# Patient Record
Sex: FEMALE | Race: WHITE | Employment: OTHER | URBAN - METROPOLITAN AREA
[De-identification: names, ages, dates, MRNs, and addresses within clinical notes are randomized per-mention and may not be internally consistent; named-entity substitution may affect disease eponyms.]

---

## 2017-01-04 RX ORDER — ATORVASTATIN CALCIUM 10 MG/1
10 TABLET, FILM COATED ORAL NIGHTLY
Qty: 90 TABLET | Refills: 0 | Status: SHIPPED | OUTPATIENT
Start: 2017-01-04 | End: 2017-04-17

## 2017-01-04 NOTE — TELEPHONE ENCOUNTER
From: Evens Stoddard  To: Mely Hernandez MD  Sent: 1/4/2017 7:47 AM CST  Subject: Medication Renewal Request    Original authorizing provider: MD Evens Vaughn would like a refill of the following medications:  Sheng Srinivasan

## 2017-01-21 NOTE — TELEPHONE ENCOUNTER
From: Ros Vann  To: Sydney Rosario MD  Sent: 1/20/2017 4:27 PM CST  Subject: Medication Renewal Request    Original authorizing provider: MD Ros Matta would like a refill of the following medications:  lisinopril

## 2017-01-23 RX ORDER — LISINOPRIL 5 MG/1
5 TABLET ORAL DAILY
Qty: 90 TABLET | Refills: 3 | OUTPATIENT
Start: 2017-01-23

## 2017-01-24 RX ORDER — LISINOPRIL 5 MG/1
TABLET ORAL
Qty: 90 TABLET | Refills: 0 | Status: SHIPPED | OUTPATIENT
Start: 2017-01-24 | End: 2017-05-15

## 2017-03-28 ENCOUNTER — HOSPITAL ENCOUNTER (OUTPATIENT)
Dept: GENERAL RADIOLOGY | Age: 72
Discharge: HOME | End: 2017-03-28
Attending: INTERNAL MEDICINE
Payer: MEDICARE

## 2017-03-28 ENCOUNTER — OFFICE VISIT (OUTPATIENT)
Dept: INTERNAL MEDICINE CLINIC | Facility: CLINIC | Age: 72
End: 2017-03-28

## 2017-03-28 VITALS
RESPIRATION RATE: 16 BRPM | TEMPERATURE: 98 F | BODY MASS INDEX: 32.79 KG/M2 | OXYGEN SATURATION: 99 % | HEART RATE: 59 BPM | HEIGHT: 65.5 IN | SYSTOLIC BLOOD PRESSURE: 122 MMHG | WEIGHT: 199.19 LBS | DIASTOLIC BLOOD PRESSURE: 82 MMHG

## 2017-03-28 DIAGNOSIS — G89.29 CHRONIC RIGHT-SIDED LOW BACK PAIN WITHOUT SCIATICA: ICD-10-CM

## 2017-03-28 DIAGNOSIS — M54.50 CHRONIC RIGHT-SIDED LOW BACK PAIN WITHOUT SCIATICA: ICD-10-CM

## 2017-03-28 DIAGNOSIS — R52 PAIN: ICD-10-CM

## 2017-03-28 DIAGNOSIS — R52 PAIN: Primary | ICD-10-CM

## 2017-03-28 DIAGNOSIS — M12.812 ROTATOR CUFF ARTHROPATHY, LEFT: ICD-10-CM

## 2017-03-28 PROCEDURE — 99214 OFFICE O/P EST MOD 30 MIN: CPT | Performed by: INTERNAL MEDICINE

## 2017-03-28 PROCEDURE — 73030 X-RAY EXAM OF SHOULDER: CPT

## 2017-03-28 NOTE — PROGRESS NOTES
Doug Overton is a 74year old female  Patient presents with:  Pain: Left Upper Arm/Shoulder area, Right side      HPI:   Left arm pain for several weeks, there all the time, not progressive, reaching elicits and so does internal rotation, shooting pain up Dyslipidemia     Abnormal Pap smear of cervix     Essential hypertension     KATIANA I (cervical intraepithelial neoplasia I)     Tachyarrhythmia     IGT (impaired glucose tolerance)     Carotid disease, bilateral (HCC)        REVIEW OF SYSTEMS:   GENERAL HE

## 2017-03-30 ENCOUNTER — TELEPHONE (OUTPATIENT)
Dept: INTERNAL MEDICINE CLINIC | Facility: CLINIC | Age: 72
End: 2017-03-30

## 2017-03-30 NOTE — TELEPHONE ENCOUNTER
Patient called returning Tierra's call to get her test results of her Xray of her shoulder. No one picking up, please return patients call. Thank you.

## 2017-04-04 ENCOUNTER — OFFICE VISIT (OUTPATIENT)
Dept: PHYSICAL THERAPY | Age: 72
End: 2017-04-04
Attending: INTERNAL MEDICINE
Payer: MEDICARE

## 2017-04-04 DIAGNOSIS — M12.812 ROTATOR CUFF ARTHROPATHY, LEFT: Primary | ICD-10-CM

## 2017-04-04 DIAGNOSIS — G89.29 CHRONIC RIGHT-SIDED LOW BACK PAIN WITHOUT SCIATICA: ICD-10-CM

## 2017-04-04 DIAGNOSIS — M54.50 CHRONIC RIGHT-SIDED LOW BACK PAIN WITHOUT SCIATICA: ICD-10-CM

## 2017-04-04 PROCEDURE — 97162 PT EVAL MOD COMPLEX 30 MIN: CPT

## 2017-04-04 PROCEDURE — 97110 THERAPEUTIC EXERCISES: CPT

## 2017-04-04 PROCEDURE — 97530 THERAPEUTIC ACTIVITIES: CPT

## 2017-04-04 NOTE — PROGRESS NOTES
UPPER EXTREMITY EVALUATION:   Referring Physician: Dr. Qasim Chase  Diagnosis: L RTC arthropathy   .  R LBP Date of Service: 4/4/2017     PATIENT SUMMARY   Ximena Paul is a 74year old y/o female who presents to therapy today with complaints of L shoul overhead, behind her back and laterally. She also notes R sided low back pain without radiculopathy that increases when standing and walking, decreases with sitting. Pain is subacute and was not slightly increased with lumbar side bend left.  Differential external rotation, SL abd, SKTC, seated flexion. Initiated posterior mobs, inferior glides, gentle stretch to L shoulder.    Therapeutic activities: discusses use of ice, activity modification, especially in the pool, use of flexion to ease back pain, incre care.    X___________________________________________________ Date____________________    Certification From: 1/8/2365  To:7/3/2017

## 2017-04-13 ENCOUNTER — OFFICE VISIT (OUTPATIENT)
Dept: PHYSICAL THERAPY | Age: 72
End: 2017-04-13
Attending: INTERNAL MEDICINE
Payer: MEDICARE

## 2017-04-13 PROCEDURE — 97110 THERAPEUTIC EXERCISES: CPT

## 2017-04-13 PROCEDURE — 97140 MANUAL THERAPY 1/> REGIONS: CPT

## 2017-04-13 NOTE — PROGRESS NOTES
Dx: L shoulder impingement, R LBP       Authorized # of Visits:  10       Next MD visit: none scheduled  Fall Risk: standard         Precautions: n/a     Significant findings include TIA,HTN, carotid disease, R trochanteric bursitis.     Xray: shoulder: mil lumbar flexion  30 sec x3         Pulleys for IR  5 mins         Posterior and inf glides to L GH joints GR II-III         Gentle stretch into L shoulder ER/IR,flex/abd         L L-S rot mob Gr II, central p/a to lumbar spine GR II

## 2017-04-17 RX ORDER — ATORVASTATIN CALCIUM 10 MG/1
TABLET, FILM COATED ORAL
Qty: 90 TABLET | Refills: 0 | Status: SHIPPED | OUTPATIENT
Start: 2017-04-17 | End: 2017-09-06

## 2017-04-17 RX ORDER — LEVOTHYROXINE SODIUM 0.05 MG/1
TABLET ORAL
Qty: 90 TABLET | Refills: 0 | Status: SHIPPED | OUTPATIENT
Start: 2017-04-17 | End: 2017-08-07

## 2017-04-17 NOTE — TELEPHONE ENCOUNTER
Pt states that she is in physical therapy now, will call in  1-2 months to schedule this appt when she is done.

## 2017-04-18 ENCOUNTER — APPOINTMENT (OUTPATIENT)
Dept: PHYSICAL THERAPY | Age: 72
End: 2017-04-18
Attending: INTERNAL MEDICINE
Payer: MEDICARE

## 2017-04-20 ENCOUNTER — OFFICE VISIT (OUTPATIENT)
Dept: PHYSICAL THERAPY | Age: 72
End: 2017-04-20
Attending: INTERNAL MEDICINE
Payer: MEDICARE

## 2017-04-20 PROCEDURE — 97112 NEUROMUSCULAR REEDUCATION: CPT

## 2017-04-20 PROCEDURE — 97140 MANUAL THERAPY 1/> REGIONS: CPT

## 2017-04-20 PROCEDURE — 97110 THERAPEUTIC EXERCISES: CPT

## 2017-04-20 NOTE — PROGRESS NOTES
Dx: L shoulder impingement, R LBP       Authorized # of Visits:  10       Next MD visit: none scheduled  Fall Risk: standard         Precautions: n/a     Significant findings include TIA,HTN, carotid disease, R trochanteric bursitis.     Xray: shoulder: mil free ROM  *  Added towel stretch for L IR Shoulder abd and flex to 90 deg x20              * added seated lumbar flexion  30 sec x3 SKTC 30 sec x3 R/l        Pulleys for IR  5 mins Pulleys for flexion, abd, IR        Posterior and inf glides to L GH joints

## 2017-04-27 ENCOUNTER — APPOINTMENT (OUTPATIENT)
Dept: PHYSICAL THERAPY | Age: 72
End: 2017-04-27
Attending: INTERNAL MEDICINE
Payer: MEDICARE

## 2017-05-15 RX ORDER — LISINOPRIL 5 MG/1
TABLET ORAL
Qty: 90 TABLET | Refills: 0 | Status: SHIPPED | OUTPATIENT
Start: 2017-05-15 | End: 2017-09-26

## 2017-08-07 RX ORDER — LEVOTHYROXINE SODIUM 0.05 MG/1
TABLET ORAL
Qty: 90 TABLET | Refills: 0 | Status: SHIPPED | OUTPATIENT
Start: 2017-08-07 | End: 2017-11-16

## 2017-08-16 ENCOUNTER — HOSPITAL ENCOUNTER (OUTPATIENT)
Dept: CV DIAGNOSTICS | Facility: HOSPITAL | Age: 72
Discharge: HOME | End: 2017-08-16
Attending: INTERNAL MEDICINE
Payer: MEDICARE

## 2017-08-16 DIAGNOSIS — I10 ESSENTIAL HYPERTENSION: ICD-10-CM

## 2017-08-16 DIAGNOSIS — R00.0 TACHYARRHYTHMIA: ICD-10-CM

## 2017-08-16 PROCEDURE — 93270 REMOTE 30 DAY ECG REV/REPORT: CPT | Performed by: INTERNAL MEDICINE

## 2017-08-16 PROCEDURE — 93226 XTRNL ECG REC<48 HR SCAN A/R: CPT | Performed by: INTERNAL MEDICINE

## 2017-08-16 PROCEDURE — 93272 ECG/REVIEW INTERPRET ONLY: CPT | Performed by: INTERNAL MEDICINE

## 2017-09-06 RX ORDER — ATORVASTATIN CALCIUM 10 MG/1
TABLET, FILM COATED ORAL
Qty: 90 TABLET | Refills: 0 | Status: SHIPPED | OUTPATIENT
Start: 2017-09-06 | End: 2018-01-04

## 2017-09-26 RX ORDER — LISINOPRIL 5 MG/1
TABLET ORAL
Qty: 90 TABLET | Refills: 0 | Status: SHIPPED | OUTPATIENT
Start: 2017-09-26 | End: 2017-12-28

## 2017-09-29 PROBLEM — I47.10 SVT (SUPRAVENTRICULAR TACHYCARDIA): Status: ACTIVE | Noted: 2017-09-29

## 2017-09-29 PROBLEM — I47.10 SVT (SUPRAVENTRICULAR TACHYCARDIA) (HCC): Status: ACTIVE | Noted: 2017-09-29

## 2017-09-29 PROBLEM — I47.1 SVT (SUPRAVENTRICULAR TACHYCARDIA) (HCC): Status: ACTIVE | Noted: 2017-09-29

## 2017-09-29 PROBLEM — I47.1 SVT (SUPRAVENTRICULAR TACHYCARDIA): Status: ACTIVE | Noted: 2017-09-29

## 2017-10-09 ENCOUNTER — OFFICE VISIT (OUTPATIENT)
Dept: INTERNAL MEDICINE CLINIC | Facility: CLINIC | Age: 72
End: 2017-10-09

## 2017-10-09 VITALS
BODY MASS INDEX: 32.21 KG/M2 | OXYGEN SATURATION: 99 % | HEART RATE: 51 BPM | WEIGHT: 191 LBS | RESPIRATION RATE: 14 BRPM | DIASTOLIC BLOOD PRESSURE: 68 MMHG | HEIGHT: 64.75 IN | SYSTOLIC BLOOD PRESSURE: 126 MMHG | TEMPERATURE: 98 F

## 2017-10-09 DIAGNOSIS — I77.9 CAROTID DISEASE, BILATERAL (HCC): ICD-10-CM

## 2017-10-09 DIAGNOSIS — I10 ESSENTIAL HYPERTENSION: ICD-10-CM

## 2017-10-09 DIAGNOSIS — I47.1 SVT (SUPRAVENTRICULAR TACHYCARDIA) (HCC): ICD-10-CM

## 2017-10-09 DIAGNOSIS — Z12.31 ENCOUNTER FOR SCREENING MAMMOGRAM FOR BREAST CANCER: Primary | ICD-10-CM

## 2017-10-09 DIAGNOSIS — I63.81 LACUNAR INFARCTION (HCC): ICD-10-CM

## 2017-10-09 DIAGNOSIS — N32.81 OAB (OVERACTIVE BLADDER): ICD-10-CM

## 2017-10-09 DIAGNOSIS — E03.9 ACQUIRED HYPOTHYROIDISM: ICD-10-CM

## 2017-10-09 DIAGNOSIS — N87.0 CIN I (CERVICAL INTRAEPITHELIAL NEOPLASIA I): ICD-10-CM

## 2017-10-09 DIAGNOSIS — R87.619 ABNORMAL CERVICAL PAPANICOLAOU SMEAR, UNSPECIFIED ABNORMAL PAP FINDING: ICD-10-CM

## 2017-10-09 DIAGNOSIS — E78.5 DYSLIPIDEMIA: ICD-10-CM

## 2017-10-09 DIAGNOSIS — Z00.00 ENCOUNTER FOR ANNUAL HEALTH EXAMINATION: ICD-10-CM

## 2017-10-09 DIAGNOSIS — R73.02 IGT (IMPAIRED GLUCOSE TOLERANCE): ICD-10-CM

## 2017-10-09 PROCEDURE — G0439 PPPS, SUBSEQ VISIT: HCPCS | Performed by: INTERNAL MEDICINE

## 2017-10-09 PROCEDURE — 96160 PT-FOCUSED HLTH RISK ASSMT: CPT | Performed by: INTERNAL MEDICINE

## 2017-10-09 NOTE — PATIENT INSTRUCTIONS
Nick Nieto's SCREENING SCHEDULE   Tests on this list are recommended by your physician but may not be covered, or covered at this frequency, by your insurer. Please check with your insurance carrier before scheduling to verify coverage.    PREVENTATIVE Colonoscopy Screen   Covered every 10 years- more often if abnormal Colonoscopy,10 Years due on 09/27/2020 Update Health Maintenance if applicable    Flex Sigmoidoscopy Screen  Covered every 5 years No results found for this or any previous visit.  No fl get once after your 65th birthday    Pneumococcal 23 (Pneumovax)  Covered Once after 65 No orders found for this or any previous visit.  Please get once after your 65th birthday    Hepatitis B for Moderate/High Risk       No orders found for this or any pre

## 2017-10-09 NOTE — PROGRESS NOTES
HPI:   Doug Overton is a 74year old female who presents for a MA (Medicare Advantage) Supervisit (Once per calendar year).     And MA BCBS PE, she sees dr Everette Paredes for her well woman to f/ou on abnormal paps and was just tested, f/u 6 mos  She lives alone 1/2 TABLET(12.5 MG) BY MOUTH DAILY (Patient taking differently: TAKE 1/2 TABLET(12.5 MG) BY MOUTH TWICE DAILY. )   ATORVASTATIN 10 MG Oral Tab TAKE 1 TABLET(10 MG) BY MOUTH EVERY NIGHT   LEVOTHYROXINE SODIUM 50 MCG Oral Tab TAKE ONE TABLET BY MOUTH BEFORE B pack-year smoking history. She has never used smokeless tobacco. She reports that she drinks alcohol. She reports that she does not use drugs.      REVIEW OF SYSTEMS:   GENERAL: feels well otherwise  SKIN: denies any unusual skin lesions  EYES: denies blurr Clear to auscultation bilaterally, respirations unlabored   Heart:  Regular rate and rhythm, S1 and S2 normal, no murmur, rub, or gallop   Abdomen:   Soft, non-tender, bowel sounds active all four quadrants,  no masses, no organomegaly   Pelvic: Deferred THYROXINE)      Lacunar infarction ([facility] [address]- no residual, on ASA daily, RF control, recheck carotid scan periodically         Ms. Nieto already takes aspirin and has it on her medication list.     Diet assessment: excellent     Advanced Directive:  Living [facility] Yes    Hearing Problems?: No     Functional Status     Hearing Problems?: No    Vision Problems? : No    Difficulty walking?: No    Difficulty dressing or bathing?: No    Problems with daily activities? : No    Memory Problems?: No      Fall/Risk Assessmen LDL Annually LDL-CHOLESTEROL (mg/dL (calc))   Date Value   12/01/2016 86        EKG - w/ Initial Preventative Physical Exam only, or if medically necessary Electrocardiogram date09/29/2017       Colorectal Cancer Screening      Colonoscopy Screen every Toxoid  Only covered with a cut with metal- TD and TDaP Not covered by Medicare Part B No vaccine history found This may be covered with your prescription benefits, but Medicare does not cover unless Medically needed    Zoster  Not covered by Medicare Part

## 2017-10-23 ENCOUNTER — TELEPHONE (OUTPATIENT)
Dept: INTERNAL MEDICINE CLINIC | Facility: CLINIC | Age: 72
End: 2017-10-23

## 2017-10-23 NOTE — TELEPHONE ENCOUNTER
Incoming (mail or fax):  fax  Received from:  [facility]  Documentation given to:  triage

## 2017-10-23 NOTE — TELEPHONE ENCOUNTER
Received fax from [facility] re: mammogram done 10/19/17. Impression:Benign Findings; routine screening mammogram in 1 year. To consult folder.

## 2017-11-16 RX ORDER — LEVOTHYROXINE SODIUM 0.05 MG/1
TABLET ORAL
Qty: 90 TABLET | Refills: 2 | Status: SHIPPED | OUTPATIENT
Start: 2017-11-16 | End: 2018-09-05

## 2017-12-29 RX ORDER — LISINOPRIL 5 MG/1
TABLET ORAL
Qty: 90 TABLET | Refills: 0 | Status: SHIPPED | OUTPATIENT
Start: 2017-12-29 | End: 2018-04-28

## 2018-01-04 RX ORDER — ATORVASTATIN CALCIUM 10 MG/1
TABLET, FILM COATED ORAL
Qty: 90 TABLET | Refills: 0 | Status: SHIPPED | OUTPATIENT
Start: 2018-01-04 | End: 2018-04-28

## 2018-04-30 RX ORDER — LISINOPRIL 5 MG/1
TABLET ORAL
Qty: 90 TABLET | Refills: 0 | Status: SHIPPED | OUTPATIENT
Start: 2018-04-30 | End: 2018-08-09

## 2018-04-30 RX ORDER — ATORVASTATIN CALCIUM 10 MG/1
TABLET, FILM COATED ORAL
Qty: 90 TABLET | Refills: 0 | Status: SHIPPED | OUTPATIENT
Start: 2018-04-30 | End: 2018-08-07

## 2018-08-08 ENCOUNTER — TELEPHONE (OUTPATIENT)
Dept: INTERNAL MEDICINE CLINIC | Facility: CLINIC | Age: 73
End: 2018-08-08

## 2018-08-08 RX ORDER — ATORVASTATIN CALCIUM 10 MG/1
TABLET, FILM COATED ORAL
Qty: 90 TABLET | Refills: 0 | Status: SHIPPED | OUTPATIENT
Start: 2018-08-08 | End: 2018-11-20

## 2018-08-08 NOTE — TELEPHONE ENCOUNTER
Lisinopril    Last OV relevant to medication: 10/9/17 Supervisit  Last refill date: 4/30/18     #/refills: 90+1  When pt was asked to return for OV: 1 yr  Upcoming appt/reason: none    Lab Results  Component Value Date   GLU 93 12/13/2017   BUN 19 12/13/20

## 2018-08-09 RX ORDER — LISINOPRIL 5 MG/1
TABLET ORAL
Qty: 90 TABLET | Refills: 0 | Status: SHIPPED | OUTPATIENT
Start: 2018-08-09 | End: 2018-11-20

## 2018-09-07 RX ORDER — LEVOTHYROXINE SODIUM 0.05 MG/1
TABLET ORAL
Qty: 90 TABLET | Refills: 0 | Status: SHIPPED | OUTPATIENT
Start: 2018-09-07 | End: 2018-12-11

## 2018-09-26 ENCOUNTER — OFFICE VISIT (OUTPATIENT)
Dept: INTERNAL MEDICINE CLINIC | Facility: CLINIC | Age: 73
End: 2018-09-26
Payer: MEDICARE

## 2018-09-26 VITALS
SYSTOLIC BLOOD PRESSURE: 122 MMHG | OXYGEN SATURATION: 99 % | RESPIRATION RATE: 16 BRPM | TEMPERATURE: 98 F | HEIGHT: 64.75 IN | WEIGHT: 197 LBS | HEART RATE: 62 BPM | DIASTOLIC BLOOD PRESSURE: 64 MMHG | BODY MASS INDEX: 33.22 KG/M2

## 2018-09-26 DIAGNOSIS — I47.1 SVT (SUPRAVENTRICULAR TACHYCARDIA) (HCC): ICD-10-CM

## 2018-09-26 DIAGNOSIS — E78.5 DYSLIPIDEMIA: ICD-10-CM

## 2018-09-26 DIAGNOSIS — I65.23 BILATERAL CAROTID ARTERY STENOSIS: ICD-10-CM

## 2018-09-26 DIAGNOSIS — I63.81 LACUNAR INFARCTION (HCC): ICD-10-CM

## 2018-09-26 DIAGNOSIS — R87.619 ABNORMAL CERVICAL PAPANICOLAOU SMEAR, UNSPECIFIED ABNORMAL PAP FINDING: ICD-10-CM

## 2018-09-26 DIAGNOSIS — R00.0 TACHYCARDIA: ICD-10-CM

## 2018-09-26 DIAGNOSIS — R10.9 RIGHT FLANK PAIN: ICD-10-CM

## 2018-09-26 DIAGNOSIS — R73.02 IGT (IMPAIRED GLUCOSE TOLERANCE): ICD-10-CM

## 2018-09-26 DIAGNOSIS — Z12.31 ENCOUNTER FOR SCREENING MAMMOGRAM FOR BREAST CANCER: Primary | ICD-10-CM

## 2018-09-26 DIAGNOSIS — E03.9 ACQUIRED HYPOTHYROIDISM: ICD-10-CM

## 2018-09-26 DIAGNOSIS — N87.0 CIN I (CERVICAL INTRAEPITHELIAL NEOPLASIA I): ICD-10-CM

## 2018-09-26 DIAGNOSIS — I10 ESSENTIAL HYPERTENSION: ICD-10-CM

## 2018-09-26 DIAGNOSIS — Z00.00 ENCOUNTER FOR ANNUAL HEALTH EXAMINATION: ICD-10-CM

## 2018-09-26 PROCEDURE — 96160 PT-FOCUSED HLTH RISK ASSMT: CPT | Performed by: INTERNAL MEDICINE

## 2018-09-26 PROCEDURE — G0439 PPPS, SUBSEQ VISIT: HCPCS | Performed by: INTERNAL MEDICINE

## 2018-09-26 RX ORDER — OMEPRAZOLE 20 MG/1
20 CAPSULE, DELAYED RELEASE ORAL
COMMUNITY
End: 2021-08-31

## 2018-09-26 NOTE — PROGRESS NOTES
HPI:   Jordan Herron is a 74year old female who presents for a MA (Medicare Advantage) Supervisit (Once per calendar year).     And [place] BCBS PE, she sees dr Lizbeth Law for her well woman to f/ou on abnormal paps and was just tested, f/u 6 mos  She lives alone (Office Visit) with Sydney Rosario MD:  omeprazole 20 MG Oral Capsule Delayed Release Take 20 mg by mouth daily as needed. metoprolol Tartrate 25 MG Oral Tab TAKE 1/2 TABLET(12.5 MG) BY MOUTH TWICE DAILY.    LEVOTHYROXINE SODIUM 50 MCG Oral Tab T of onset: 76) in her brother. SOCIAL HISTORY:   She  reports that she quit smoking about 43 years ago. Her smoking use included cigarettes. She has a 2.50 pack-year smoking history.  she has never used smokeless tobacco. She reports that she drinks alcoho teeth and gums normal   Neck: Supple, symmetrical, trachea midline, no adenopathy;  thyroid: not enlarged, symmetric, no tenderness/mass/nodules; no carotid bruit or JVD   Back:   Symmetric, no curvature, ROM normal, no CVA tenderness   Lungs:   Clear to a unspecified abnormal pap finding- per Dr Elma Castillo    KATIANA I (cervical intraepithelial neoplasia I) per Dr Elma Castillo    Acquired hypothyroidism euthyroid clinically, check labs, CPM  -     ASSAY, THYROID STIM HORMONE  -     FREE T4 (FREE THYROXINE)      Lacunar i help    Toileting: Able without help    Dressing: Able without help    Eating: Able without help    Driving: Able without help    Preparing your meals: Able without help    Managing money/bills: Able without help    Taking medications as prescribed: Able w EKG - w/ Initial Preventative Physical Exam only, or if medically necessary Electrocardiogram date09/29/2017       Colorectal Cancer Screening      Colonoscopy Screen every 10 years Colonoscopy due on 09/27/2020 Update Health Maintenance if applicable    F Not covered by Medicare Part B No vaccine history found This may be covered with your prescription benefits, but Medicare does not cover unless Medically needed    Zoster  Not covered by Medicare Part B No vaccine history found This may be covered with you

## 2018-09-26 NOTE — PATIENT INSTRUCTIONS
King Yogesh Nieto's SCREENING SCHEDULE   Tests on this list are recommended by your physician but may not be covered, or covered at this frequency, by your insurer. Please check with your insurance carrier before scheduling to verify coverage.    PREVENTATIVE Colonoscopy Screen   Covered every 10 years- more often if abnormal Colonoscopy due on 09/27/2020 Update [facility] if applicable    Flex Sigmoidoscopy Screen  Covered every 5 years No results found for this or any previous visit.  No flowsheet data VACC, 13 STEVE IM    Please get once after your 65th birthday    Pneumococcal 23 (Pneumovax)  Covered Once after 65 No orders found for this or any previous visit.  Please get once after your 65th birthday    Hepatitis B for Moderate/High Risk       No orders

## 2018-10-29 ENCOUNTER — TELEPHONE (OUTPATIENT)
Dept: INTERNAL MEDICINE CLINIC | Facility: CLINIC | Age: 73
End: 2018-10-29

## 2018-10-29 NOTE — TELEPHONE ENCOUNTER
Incoming (mail or fax):   Fax  Received from:  [facility] [place]  Documentation given to:  Left documentation in Dr. Anita Joseph

## 2018-10-29 NOTE — TELEPHONE ENCOUNTER
Mammogram report received from [facility] and placed in folder. Updated Health Maintenance and routed to Dr. Jamila Fields.

## 2018-11-20 RX ORDER — ATORVASTATIN CALCIUM 10 MG/1
TABLET, FILM COATED ORAL
Qty: 90 TABLET | Refills: 0 | Status: SHIPPED | OUTPATIENT
Start: 2018-11-20 | End: 2019-02-25

## 2018-11-20 RX ORDER — LISINOPRIL 5 MG/1
TABLET ORAL
Qty: 90 TABLET | Refills: 0 | Status: SHIPPED | OUTPATIENT
Start: 2018-11-20 | End: 2019-02-25

## 2018-12-12 RX ORDER — LEVOTHYROXINE SODIUM 0.05 MG/1
TABLET ORAL
Qty: 90 TABLET | Refills: 0 | Status: SHIPPED | OUTPATIENT
Start: 2018-12-12 | End: 2019-03-19

## 2019-02-27 RX ORDER — ATORVASTATIN CALCIUM 10 MG/1
TABLET, FILM COATED ORAL
Qty: 90 TABLET | Refills: 1 | Status: SHIPPED | OUTPATIENT
Start: 2019-02-27 | End: 2019-09-03 | Stop reason: DRUGHIGH

## 2019-02-27 RX ORDER — LISINOPRIL 5 MG/1
TABLET ORAL
Qty: 90 TABLET | Refills: 1 | Status: SHIPPED | OUTPATIENT
Start: 2019-02-27 | End: 2019-08-25

## 2019-03-20 RX ORDER — LEVOTHYROXINE SODIUM 0.05 MG/1
TABLET ORAL
Qty: 90 TABLET | Refills: 1 | Status: SHIPPED | OUTPATIENT
Start: 2019-03-20 | End: 2019-10-05

## 2019-06-25 ENCOUNTER — TELEPHONE (OUTPATIENT)
Dept: INTERNAL MEDICINE CLINIC | Facility: CLINIC | Age: 74
End: 2019-06-25

## 2019-08-05 PROBLEM — I70.0 ATHEROSCLEROSIS OF AORTA: Status: ACTIVE | Noted: 2019-08-05

## 2019-08-05 PROBLEM — I70.0 ATHEROSCLEROSIS OF AORTA (HCC): Status: ACTIVE | Noted: 2019-08-05

## 2019-08-05 PROBLEM — I77.1 TORTUOUS AORTA: Status: ACTIVE | Noted: 2019-08-05

## 2019-08-05 PROBLEM — I77.1 TORTUOUS AORTA (HCC): Status: ACTIVE | Noted: 2019-08-05

## 2019-08-16 ENCOUNTER — OFFICE VISIT (OUTPATIENT)
Dept: INTERNAL MEDICINE CLINIC | Facility: CLINIC | Age: 74
End: 2019-08-16
Payer: MEDICARE

## 2019-08-16 VITALS
RESPIRATION RATE: 16 BRPM | HEART RATE: 55 BPM | BODY MASS INDEX: 33.15 KG/M2 | HEIGHT: 65 IN | OXYGEN SATURATION: 96 % | DIASTOLIC BLOOD PRESSURE: 82 MMHG | SYSTOLIC BLOOD PRESSURE: 126 MMHG | WEIGHT: 199 LBS

## 2019-08-16 DIAGNOSIS — Z00.00 ENCOUNTER FOR ANNUAL HEALTH EXAMINATION: Primary | ICD-10-CM

## 2019-08-16 DIAGNOSIS — R87.619 ABNORMAL CERVICAL PAPANICOLAOU SMEAR, UNSPECIFIED ABNORMAL PAP FINDING: ICD-10-CM

## 2019-08-16 DIAGNOSIS — R73.02 IGT (IMPAIRED GLUCOSE TOLERANCE): ICD-10-CM

## 2019-08-16 DIAGNOSIS — I47.1 SVT (SUPRAVENTRICULAR TACHYCARDIA) (HCC): ICD-10-CM

## 2019-08-16 DIAGNOSIS — I65.23 BILATERAL CAROTID ARTERY STENOSIS: ICD-10-CM

## 2019-08-16 DIAGNOSIS — I77.1 TORTUOUS AORTA (HCC): ICD-10-CM

## 2019-08-16 DIAGNOSIS — N87.0 CIN I (CERVICAL INTRAEPITHELIAL NEOPLASIA I): ICD-10-CM

## 2019-08-16 DIAGNOSIS — E78.5 DYSLIPIDEMIA: ICD-10-CM

## 2019-08-16 DIAGNOSIS — I70.0 ATHEROSCLEROSIS OF AORTA (HCC): ICD-10-CM

## 2019-08-16 DIAGNOSIS — E03.9 ACQUIRED HYPOTHYROIDISM: ICD-10-CM

## 2019-08-16 DIAGNOSIS — I63.81 LACUNAR INFARCTION (HCC): ICD-10-CM

## 2019-08-16 DIAGNOSIS — I10 ESSENTIAL HYPERTENSION: ICD-10-CM

## 2019-08-16 PROCEDURE — G0439 PPPS, SUBSEQ VISIT: HCPCS | Performed by: INTERNAL MEDICINE

## 2019-08-16 PROCEDURE — 96160 PT-FOCUSED HLTH RISK ASSMT: CPT | Performed by: INTERNAL MEDICINE

## 2019-08-16 PROCEDURE — 99397 PER PM REEVAL EST PAT 65+ YR: CPT | Performed by: INTERNAL MEDICINE

## 2019-08-16 NOTE — PROGRESS NOTES
HPI:   Harper Alexander is a 66year old female who presents for a MA (Medicare Advantage) Supervisit (Once per calendar year).     And [place] BCBS PE, she sees dr Delisa Chaudhry for her well woman to f/ou on abnormal paps and was just tested in January w/dr alvarenga  She Results   Component Value Date    WBC 11.6 04/09/2016    HGB 12.1 04/09/2016    .0 04/09/2016        ALLERGIES:   She is allergic to seasonal.    CURRENT MEDICATIONS:     Outpatient Medications Marked as Taking for the 8/16/19 encounter (Office Visi in her mother; CVA in her brother; DM, in her mother; HTN,hypercholesterolemia in an other family member; Heart Disorder in her mother; MI, in an other family member; TB in her maternal grandmother; arthritis in her mother; bladder cancer (age of onset: 76 EOM's intact both eyes   Ears:  Normal TM's and external ear canals, both ears   Nose: Nares normal, septum midline,mucosa normal, no drainage or sinus tenderness   Throat: Lips, mucosa, and tongue normal; teeth and gums normal   Neck: Supple, symmetrical, control    Essential hypertension controlled, CPM  -     COMP METABOLIC PANEL (14)    SVT (supraventricular tachycardia) (HCC) stable and mild on BB  -     COMP METABOLIC PANEL (14)    Abnormal cervical Papanicolaou smear, unspecified abnormal pap finding- Family; Visiting Friends    If you are a male age 38-65 or a female age 47-67, do you take aspirin?: Yes    Have you had any immunizations at another office such as Influenza, Hepatitis B, Tetanus, or Pneumococcal?: Yes     Functional Ability     Bathing or provided for quick review of chart, separate sheet to patient  [address] Internal Lab or Procedure External Lab or Procedure   Diabetes Screening      HbgA1C   Annually Lab Results   Component Value Date    A1C 5.8 (H) after 65 09/01/2008 Please get once after your 65th birthday    Hepatitis B for Moderate/High Risk No vaccine history found Medium/high risk factors:   End-stage renal disease   Hemophiliacs who received Factor VIII or IX concentrates   Clients of institut

## 2019-08-16 NOTE — PATIENT INSTRUCTIONS
Kath Nieto's SCREENING SCHEDULE   Tests on this list are recommended by your physician but may not be covered, or covered at this frequency, by your insurer. Please check with your insurance carrier before scheduling to verify coverage.    PREVENTATIVE DUE IN SEPT 27, 2020 Update Health Maintenance if applicable    Flex Sigmoidoscopy Screen  Covered every 5 years No results found for this or any previous visit. No flowsheet data found.      Fecal Occult Blood   Covered Annually No results found for: F 23 (Pneumovax)  Covered Once after 65 No orders found for this or any previous visit. YOU HAD THIS Please get once after your 65th birthday    Hepatitis B for Moderate/High Risk       No orders found for this or any previous visit.      NA Medium/high r

## 2019-08-25 DIAGNOSIS — I10 ESSENTIAL HYPERTENSION: Primary | ICD-10-CM

## 2019-08-26 RX ORDER — LISINOPRIL 5 MG/1
TABLET ORAL
Qty: 90 TABLET | Refills: 0 | Status: SHIPPED | OUTPATIENT
Start: 2019-08-26 | End: 2019-11-29

## 2019-08-31 LAB
ALBUMIN/GLOBULIN RATIO: 1.6 (CALC) (ref 1–2.5)
ALBUMIN: 3.9 G/DL (ref 3.6–5.1)
ALKALINE PHOSPHATASE: 81 U/L (ref 33–130)
ALT: 12 U/L (ref 6–29)
AST: 15 U/L (ref 10–35)
BILIRUBIN, TOTAL: 0.6 MG/DL (ref 0.2–1.2)
BUN: 20 MG/DL (ref 7–25)
CALCIUM: 9.2 MG/DL (ref 8.6–10.4)
CARBON DIOXIDE: 28 MMOL/L (ref 20–32)
CHLORIDE: 105 MMOL/L (ref 98–110)
CHOL/HDLC RATIO: 3.1 (CALC)
CHOLESTEROL, TOTAL: 175 MG/DL
CREATININE: 0.89 MG/DL (ref 0.6–0.93)
EGFR IF AFRICN AM: 73 ML/MIN/1.73M2
EGFR IF NONAFRICN AM: 63 ML/MIN/1.73M2
GLOBULIN: 2.4 G/DL (CALC) (ref 1.9–3.7)
GLUCOSE: 104 MG/DL (ref 65–99)
HDL CHOLESTEROL: 56 MG/DL
LDL-CHOLESTEROL: 101 MG/DL (CALC)
NON-HDL CHOLESTEROL: 119 MG/DL (CALC)
POTASSIUM: 4.6 MMOL/L (ref 3.5–5.3)
PROTEIN, TOTAL: 6.3 G/DL (ref 6.1–8.1)
SODIUM: 142 MMOL/L (ref 135–146)
TRIGLYCERIDES: 87 MG/DL
TSH: 2.34 MIU/L (ref 0.4–4.5)

## 2019-09-03 RX ORDER — ATORVASTATIN CALCIUM 40 MG/1
40 TABLET, FILM COATED ORAL NIGHTLY
Qty: 90 TABLET | Refills: 1 | Status: SHIPPED | OUTPATIENT
Start: 2019-09-03 | End: 2020-04-15

## 2019-09-03 NOTE — PROGRESS NOTES
Order for atorvastatin 40mg PO nightly for #90/1 entered previously. Spoke with patient and advised. Patient verbalized understanding.

## 2019-10-07 ENCOUNTER — HOSPITAL ENCOUNTER (OUTPATIENT)
Dept: GENERAL RADIOLOGY | Age: 74
Discharge: HOME | End: 2019-10-07
Attending: INTERNAL MEDICINE
Payer: MEDICARE

## 2019-10-07 ENCOUNTER — TELEPHONE (OUTPATIENT)
Dept: INTERNAL MEDICINE CLINIC | Facility: CLINIC | Age: 74
End: 2019-10-07

## 2019-10-07 ENCOUNTER — OFFICE VISIT (OUTPATIENT)
Dept: INTERNAL MEDICINE CLINIC | Facility: CLINIC | Age: 74
End: 2019-10-07
Payer: MEDICARE

## 2019-10-07 VITALS
BODY MASS INDEX: 33.44 KG/M2 | OXYGEN SATURATION: 98 % | RESPIRATION RATE: 14 BRPM | SYSTOLIC BLOOD PRESSURE: 122 MMHG | HEART RATE: 67 BPM | DIASTOLIC BLOOD PRESSURE: 78 MMHG | WEIGHT: 200.69 LBS | HEIGHT: 65 IN

## 2019-10-07 DIAGNOSIS — M25.512 ACUTE PAIN OF LEFT SHOULDER: ICD-10-CM

## 2019-10-07 DIAGNOSIS — Z23 NEED FOR VACCINATION: ICD-10-CM

## 2019-10-07 DIAGNOSIS — M25.512 ACUTE PAIN OF LEFT SHOULDER: Primary | ICD-10-CM

## 2019-10-07 PROCEDURE — 90662 IIV NO PRSV INCREASED AG IM: CPT | Performed by: INTERNAL MEDICINE

## 2019-10-07 PROCEDURE — 72050 X-RAY EXAM NECK SPINE 4/5VWS: CPT | Performed by: INTERNAL MEDICINE

## 2019-10-07 PROCEDURE — 99214 OFFICE O/P EST MOD 30 MIN: CPT | Performed by: INTERNAL MEDICINE

## 2019-10-07 PROCEDURE — G0008 ADMIN INFLUENZA VIRUS VAC: HCPCS | Performed by: INTERNAL MEDICINE

## 2019-10-07 RX ORDER — HYDROCODONE BITARTRATE AND ACETAMINOPHEN 5; 325 MG/1; MG/1
1-2 TABLET ORAL EVERY 6 HOURS PRN
Qty: 30 TABLET | Refills: 0 | Status: SHIPPED | OUTPATIENT
Start: 2019-10-07 | End: 2019-10-14

## 2019-10-07 RX ORDER — HYDROCODONE BITARTRATE AND ACETAMINOPHEN 5; 325 MG/1; MG/1
1-2 TABLET ORAL EVERY 6 HOURS PRN
Qty: 30 TABLET | Refills: 0 | Status: SHIPPED | OUTPATIENT
Start: 2019-10-07 | End: 2019-10-07

## 2019-10-07 RX ORDER — PREDNISONE 10 MG/1
TABLET ORAL
Qty: 28 TABLET | Refills: 0 | Status: SHIPPED | OUTPATIENT
Start: 2019-10-07 | End: 2019-10-31 | Stop reason: ALTCHOICE

## 2019-10-07 RX ORDER — CYCLOBENZAPRINE HCL 10 MG
10 TABLET ORAL NIGHTLY
Qty: 30 TABLET | Refills: 1 | Status: SHIPPED | OUTPATIENT
Start: 2019-10-07 | End: 2019-10-27

## 2019-10-07 RX ORDER — LEVOTHYROXINE SODIUM 0.05 MG/1
TABLET ORAL
Qty: 90 TABLET | Refills: 1 | Status: SHIPPED | OUTPATIENT
Start: 2019-10-07 | End: 2020-04-15

## 2019-10-07 NOTE — PROGRESS NOTES
Aden Olmedo is a 66year old female  Patient presents with:  Shoulder Pain: Left      HPI:   Severe pain in the left scapula for the past month and for the past week has radiation to left axilla and medial part of the upper arm  No neck pain  No injury Pack years: 2.5        Types: Cigarettes        Quit date: 1/10/1975        Years since quittin.7      Smokeless tobacco: Never Used      Tobacco comment: quit age 25    Alcohol use:  Yes      Alcohol/week: 0.0 standard drinks      Types: 1 Glasses of (CPT=72050)    Return in about 1 week (around 10/14/2019) for short term followup. There are no Patient Instructions on file for this visit. The patient indicates understanding of these issues and agrees to the plan.

## 2019-10-07 NOTE — TELEPHONE ENCOUNTER
Incoming (mail or fax):  Fax  Received from: [facility]   Documentation given to: Triage    *Prior Authorization

## 2019-10-08 NOTE — PROGRESS NOTES
Spoke to patient, aware of results and recommendations. Patient verbalized understanding. Patient states medication prescribed at last OV has been helping.

## 2019-10-08 NOTE — TELEPHONE ENCOUNTER
Prior authorization completed via cover my meds web site. \"Your information has been submitted to [employer] is reviewing the PA request and you will receive an electronic response.  You may check for the updated outcome later by rosa

## 2019-10-09 NOTE — TELEPHONE ENCOUNTER
Received fax from [facility] that prior Felicity Choe is approved. Called pharmacy & made aware of prior auth approval.  Rx in process. Spoke to pt & made aware rx approved & in process. Pt stated already picked up med on Monday.   Pt stated if pharmacy calls, will can

## 2019-10-09 NOTE — TELEPHONE ENCOUNTER
Incoming (mail or fax):  fax  Received from:  [address] [place] [address]  Documentation given to:  triage

## 2019-10-31 ENCOUNTER — OFFICE VISIT (OUTPATIENT)
Dept: INTERNAL MEDICINE CLINIC | Facility: CLINIC | Age: 74
End: 2019-10-31
Payer: MEDICARE

## 2019-10-31 VITALS
WEIGHT: 197.88 LBS | HEART RATE: 67 BPM | BODY MASS INDEX: 32.97 KG/M2 | OXYGEN SATURATION: 98 % | HEIGHT: 65 IN | SYSTOLIC BLOOD PRESSURE: 112 MMHG | RESPIRATION RATE: 14 BRPM | DIASTOLIC BLOOD PRESSURE: 76 MMHG

## 2019-10-31 DIAGNOSIS — M47.812 CERVICAL SPONDYLOSIS: ICD-10-CM

## 2019-10-31 DIAGNOSIS — M54.12 CERVICAL RADICULOPATHY: Primary | ICD-10-CM

## 2019-10-31 PROCEDURE — 99213 OFFICE O/P EST LOW 20 MIN: CPT | Performed by: INTERNAL MEDICINE

## 2019-10-31 NOTE — PROGRESS NOTES
oRs Vnan is a 66year old female. To F/U from last visit regarding left arm  HPI:    Interim history:she responded to steroids and heat and is pain free now  Her cspine xray showed spondylosis   No new symptoms.  No upper extremity weakness or numbness (cervical intraepithelial neoplasia I)     IGT (impaired glucose tolerance)     Carotid disease, bilateral (HCC)     SVT (supraventricular tachycardia) (HCC)     Atherosclerosis of aorta (HCC)     Tortuous aorta (HCC)     Cervical spondylosis        REVIEW

## 2019-11-07 ENCOUNTER — TELEPHONE (OUTPATIENT)
Dept: INTERNAL MEDICINE CLINIC | Facility: CLINIC | Age: 74
End: 2019-11-07

## 2019-11-07 NOTE — TELEPHONE ENCOUNTER
Received duplicate copies of mammogram patient completed with [facility]. HM updated, one copy placed for Dr. Mauricio Johnson review and signature before being sent to scan.

## 2019-11-07 NOTE — TELEPHONE ENCOUNTER
Incoming (mail or fax):  Fax  Received from: [facility] [place]   Documentation given to: Triage  (Manuel Johnson Results [place])

## 2019-11-07 NOTE — TELEPHONE ENCOUNTER
Incoming (mail or fax):  fax  Received from:  [facility]  Documentation given to:  Triage - Dr Maya Guerrero bin

## 2019-11-12 ENCOUNTER — MED REC SCAN ONLY (OUTPATIENT)
Dept: INTERNAL MEDICINE CLINIC | Facility: CLINIC | Age: 74
End: 2019-11-12

## 2019-11-29 DIAGNOSIS — I10 ESSENTIAL HYPERTENSION: ICD-10-CM

## 2019-12-02 RX ORDER — LISINOPRIL 5 MG/1
TABLET ORAL
Qty: 90 TABLET | Refills: 2 | Status: SHIPPED | OUTPATIENT
Start: 2019-12-02 | End: 2020-09-04

## 2020-01-16 DIAGNOSIS — I10 ESSENTIAL HYPERTENSION: ICD-10-CM

## 2020-01-16 DIAGNOSIS — R00.0 TACHYCARDIA: ICD-10-CM

## 2020-04-15 DIAGNOSIS — E03.9 ACQUIRED HYPOTHYROIDISM: Primary | ICD-10-CM

## 2020-04-15 DIAGNOSIS — E78.5 DYSLIPIDEMIA: ICD-10-CM

## 2020-04-15 RX ORDER — LEVOTHYROXINE SODIUM 0.05 MG/1
TABLET ORAL
Qty: 90 TABLET | Refills: 1 | Status: SHIPPED | OUTPATIENT
Start: 2020-04-15 | End: 2020-10-01

## 2020-04-15 RX ORDER — ATORVASTATIN CALCIUM 40 MG/1
TABLET, FILM COATED ORAL
Qty: 90 TABLET | Refills: 1 | Status: SHIPPED | OUTPATIENT
Start: 2020-04-15 | End: 2020-10-31

## 2020-09-04 DIAGNOSIS — I10 ESSENTIAL HYPERTENSION: ICD-10-CM

## 2020-09-04 RX ORDER — LISINOPRIL 5 MG/1
TABLET ORAL
Qty: 30 TABLET | Refills: 0 | Status: SHIPPED | OUTPATIENT
Start: 2020-09-04 | End: 2020-10-06

## 2020-09-10 ENCOUNTER — OFFICE VISIT (OUTPATIENT)
Dept: INTERNAL MEDICINE CLINIC | Facility: CLINIC | Age: 75
End: 2020-09-10
Payer: MEDICARE

## 2020-09-10 VITALS
HEIGHT: 64.92 IN | TEMPERATURE: 97 F | DIASTOLIC BLOOD PRESSURE: 70 MMHG | SYSTOLIC BLOOD PRESSURE: 116 MMHG | BODY MASS INDEX: 34.32 KG/M2 | HEART RATE: 60 BPM | WEIGHT: 206 LBS | OXYGEN SATURATION: 98 % | RESPIRATION RATE: 16 BRPM

## 2020-09-10 DIAGNOSIS — M47.812 CERVICAL SPONDYLOSIS: ICD-10-CM

## 2020-09-10 DIAGNOSIS — I77.1 TORTUOUS AORTA (HCC): ICD-10-CM

## 2020-09-10 DIAGNOSIS — Z23 FLU VACCINE NEED: ICD-10-CM

## 2020-09-10 DIAGNOSIS — I70.0 ATHEROSCLEROSIS OF AORTA (HCC): ICD-10-CM

## 2020-09-10 DIAGNOSIS — E03.9 ACQUIRED HYPOTHYROIDISM: ICD-10-CM

## 2020-09-10 DIAGNOSIS — I47.1 SVT (SUPRAVENTRICULAR TACHYCARDIA) (HCC): ICD-10-CM

## 2020-09-10 DIAGNOSIS — M47.816 LUMBAR SPONDYLOSIS: ICD-10-CM

## 2020-09-10 DIAGNOSIS — Z12.31 VISIT FOR SCREENING MAMMOGRAM: ICD-10-CM

## 2020-09-10 DIAGNOSIS — R73.02 IGT (IMPAIRED GLUCOSE TOLERANCE): ICD-10-CM

## 2020-09-10 DIAGNOSIS — E78.5 DYSLIPIDEMIA: ICD-10-CM

## 2020-09-10 DIAGNOSIS — Z12.11 COLON CANCER SCREENING: Primary | ICD-10-CM

## 2020-09-10 DIAGNOSIS — I10 ESSENTIAL HYPERTENSION: ICD-10-CM

## 2020-09-10 DIAGNOSIS — Z12.11 SCREENING FOR COLON CANCER: ICD-10-CM

## 2020-09-10 DIAGNOSIS — R87.619 ABNORMAL CERVICAL PAPANICOLAOU SMEAR, UNSPECIFIED ABNORMAL PAP FINDING: ICD-10-CM

## 2020-09-10 DIAGNOSIS — Z00.00 ENCOUNTER FOR ANNUAL HEALTH EXAMINATION: ICD-10-CM

## 2020-09-10 DIAGNOSIS — I65.23 BILATERAL CAROTID ARTERY STENOSIS: ICD-10-CM

## 2020-09-10 PROCEDURE — G0439 PPPS, SUBSEQ VISIT: HCPCS | Performed by: INTERNAL MEDICINE

## 2020-09-10 PROCEDURE — 96160 PT-FOCUSED HLTH RISK ASSMT: CPT | Performed by: INTERNAL MEDICINE

## 2020-09-10 PROCEDURE — 99397 PER PM REEVAL EST PAT 65+ YR: CPT | Performed by: INTERNAL MEDICINE

## 2020-09-10 PROCEDURE — 3074F SYST BP LT 130 MM HG: CPT | Performed by: INTERNAL MEDICINE

## 2020-09-10 PROCEDURE — G0008 ADMIN INFLUENZA VIRUS VAC: HCPCS | Performed by: INTERNAL MEDICINE

## 2020-09-10 PROCEDURE — 3078F DIAST BP <80 MM HG: CPT | Performed by: INTERNAL MEDICINE

## 2020-09-10 PROCEDURE — 90662 IIV NO PRSV INCREASED AG IM: CPT | Performed by: INTERNAL MEDICINE

## 2020-09-10 PROCEDURE — 3008F BODY MASS INDEX DOCD: CPT | Performed by: INTERNAL MEDICINE

## 2020-09-10 NOTE — PATIENT INSTRUCTIONS
Katie Nieto's SCREENING SCHEDULE   Tests on this list are recommended by your physician but may not be covered, or covered at this frequency, by your insurer. Please check with your insurance carrier before scheduling to verify coverage.    PREVENTATIVE Colonoscopy Screen   Covered every 10 years- more often if abnormal Colonoscopy due on 09/27/2020 Update [facility] if applicable    Flex Sigmoidoscopy Screen  Covered every 5 years No results found for this or any previous visit.  No flowsheet data 13 STEVE IM    Please get once after your 65th birthday    Pneumococcal 23 (Pneumovax)  Covered Once after 65 No orders found for this or any previous visit.  Please get once after your 65th birthday    Hepatitis B for Moderate/High Risk       No orders found SERVICES  INDICATIONS AND SCHEDULE Internal Lab or Procedure External Lab or Procedure   Diabetes Screening      HbgA1C    At Least  Annually for Diabetics HEMOGLOBIN A1c (% of total Hgb)   Date Value   12/01/2016 5.8 (H)    No flowsheet data found.     Fas found. Fecal Occult Blood   Covered Annually No results found for: FOB, OCCULTSTOOL No flowsheet data found.      Barium Enema-   uncomfortable but covered  Covered but uncomfortable   Glaucoma Screening      Ophthalmology Visit   Covered annually for D after your 65th birthday    Hepatitis B for Moderate/High Risk       No orders found for this or any previous visit.  Medium/high risk factors:   End-stage renal disease   Hemophiliacs who received Factor VIII or IX concentrates   Clients of institutions fo

## 2020-09-10 NOTE — PROGRESS NOTES
HPI:   Nader Jiang is a 66year old female who presents for a MA (Medicare Advantage) Supervisit (Once per calendar year). , she sees dr Chales Favre for her well woman to f/ou on abnormal paps and was just tested in January w/dr alvarenga.  She has some pers Component Value Date    AST 15 08/30/2019    ALT 12 08/30/2019    CA 9.2 08/30/2019    ALB 3.9 08/30/2019    TSH 2.34 08/30/2019    CREATSERUM 0.89 08/30/2019     (H) 08/30/2019        CBC  (most recent labs)   Lab Results   Component Value Date surgical history that includes leep; Breast biopsy (); cataract (); colonoscopy; and  ().     Her family history includes CABG in her brother; CHF in her mother; CVA in her brother; DM, in her mother; HTN,hypercholesterolemia in an other as calculated from the following:    Height as of this encounter: 64.92\". Weight as of this encounter: 206 lb (93.4 kg).       General Appearance:  Alert, cooperative, no distress, appears stated age   Head:  Normocephalic, without obvious abnormality, given    (I70.0) Atherosclerosis of aorta (HCC)  Plan: RF control    (I47.1) SVT (supraventricular tachycardia) (HCC)  Plan: mild symptoms, CPM w/BB    (H66.779) Cervical spondylosis  Plan: PT    (I77.1) Tortuous aorta (HCC)  Plan: RF control    (R73.02) I Health     In the past six months, have you lost more than 10 pounds without trying?: 2 - No    Has your appetite been poor?: No    How does the patient maintain a good energy level?: Appropriate Exercise;Daily Walks; Other(Swim)    How would you describe y depressed, or hopeless (over the last two weeks)?: Not at all    PHQ-2 SCORE: 0        Advance Directives     Do you have a healthcare power of ?: Yes    Do you have a living will?: Yes     Please go to \"Cognitive Assessment\" under Medicare Asses Pelvic      Pap: Every 3 yrs age 21-68 or Pap+HPV every 5 yrs age 33-67, age 72 and older at high risk There are no preventive care reminders to display for this patient.  Update Health Maintenance if applicable    Chlamydia  Annually if high risk No result flowsheet data found. Drug Serum Conc  Annually No results found for: DIGOXIN, DIG, VALP No flowsheet data found. Diabetes      HgbA1C  Annually HEMOGLOBIN A1c (% of total Hgb)   Date Value   12/01/2016 5.8 (H)    No flowsheet data found.     Creat/a

## 2020-09-25 LAB
ALBUMIN/GLOBULIN RATIO: 1.5 (CALC) (ref 1–2.5)
ALBUMIN: 3.7 G/DL (ref 3.6–5.1)
ALKALINE PHOSPHATASE: 101 U/L (ref 37–153)
ALT: 14 U/L (ref 6–29)
AST: 17 U/L (ref 10–35)
BILIRUBIN, TOTAL: 0.6 MG/DL (ref 0.2–1.2)
BUN: 21 MG/DL (ref 7–25)
CALCIUM: 9 MG/DL (ref 8.6–10.4)
CARBON DIOXIDE: 30 MMOL/L (ref 20–32)
CHLORIDE: 105 MMOL/L (ref 98–110)
CHOL/HDLC RATIO: 2.7 (CALC)
CHOLESTEROL, TOTAL: 150 MG/DL
CREATININE: 0.81 MG/DL (ref 0.6–0.93)
EGFR IF AFRICN AM: 81 ML/MIN/1.73M2
EGFR IF NONAFRICN AM: 70 ML/MIN/1.73M2
GLOBULIN: 2.4 G/DL (CALC) (ref 1.9–3.7)
GLUCOSE: 106 MG/DL (ref 65–99)
HDL CHOLESTEROL: 55 MG/DL
LDL-CHOLESTEROL: 76 MG/DL (CALC)
NON-HDL CHOLESTEROL: 95 MG/DL (CALC)
POTASSIUM: 4.4 MMOL/L (ref 3.5–5.3)
PROTEIN, TOTAL: 6.1 G/DL (ref 6.1–8.1)
SODIUM: 141 MMOL/L (ref 135–146)
TRIGLYCERIDES: 103 MG/DL
TSH W/REFLEX TO FT4: 3.52 MIU/L (ref 0.4–4.5)

## 2020-09-30 DIAGNOSIS — E03.9 ACQUIRED HYPOTHYROIDISM: ICD-10-CM

## 2020-10-01 RX ORDER — LEVOTHYROXINE SODIUM 0.05 MG/1
TABLET ORAL
Qty: 90 TABLET | Refills: 1 | Status: SHIPPED | OUTPATIENT
Start: 2020-10-01 | End: 2021-04-08

## 2020-10-06 DIAGNOSIS — I10 ESSENTIAL HYPERTENSION: ICD-10-CM

## 2020-10-06 RX ORDER — LISINOPRIL 5 MG/1
5 TABLET ORAL DAILY
Qty: 90 TABLET | Refills: 1 | Status: SHIPPED | OUTPATIENT
Start: 2020-10-06 | End: 2021-04-08

## 2020-10-21 DIAGNOSIS — I10 ESSENTIAL HYPERTENSION: ICD-10-CM

## 2020-10-21 DIAGNOSIS — R00.0 TACHYCARDIA: ICD-10-CM

## 2020-10-31 DIAGNOSIS — E78.5 DYSLIPIDEMIA: ICD-10-CM

## 2020-10-31 RX ORDER — ATORVASTATIN CALCIUM 40 MG/1
TABLET, FILM COATED ORAL
Qty: 90 TABLET | Refills: 1 | Status: SHIPPED | OUTPATIENT
Start: 2020-10-31 | End: 2021-07-20

## 2020-11-12 ENCOUNTER — TELEPHONE (OUTPATIENT)
Dept: INTERNAL MEDICINE CLINIC | Facility: CLINIC | Age: 75
End: 2020-11-12

## 2020-11-12 NOTE — TELEPHONE ENCOUNTER
Incoming (mail or fax): fax  Received from: [facility]  Documentation given to: Dr. Dorinda Hsu results, done 11/10/2020. Updated HM.

## 2020-11-19 ENCOUNTER — MED REC SCAN ONLY (OUTPATIENT)
Dept: INTERNAL MEDICINE CLINIC | Facility: CLINIC | Age: 75
End: 2020-11-19

## 2021-01-15 ENCOUNTER — TELEPHONE (OUTPATIENT)
Dept: INTERNAL MEDICINE CLINIC | Facility: CLINIC | Age: 76
End: 2021-01-15

## 2021-03-04 DIAGNOSIS — Z23 NEED FOR VACCINATION: ICD-10-CM

## 2021-03-08 ENCOUNTER — IMMUNIZATION (OUTPATIENT)
Dept: LAB | Age: 76
End: 2021-03-08
Attending: HOSPITALIST
Payer: MEDICARE

## 2021-03-08 DIAGNOSIS — Z23 NEED FOR VACCINATION: Primary | ICD-10-CM

## 2021-03-08 PROCEDURE — 0001A SARSCOV2 VAC 30MCG/0.3ML IM: CPT

## 2021-03-25 ENCOUNTER — TELEPHONE (OUTPATIENT)
Dept: INTERNAL MEDICINE CLINIC | Facility: CLINIC | Age: 76
End: 2021-03-25

## 2021-03-29 ENCOUNTER — IMMUNIZATION (OUTPATIENT)
Dept: LAB | Age: 76
End: 2021-03-29
Attending: HOSPITALIST
Payer: MEDICARE

## 2021-03-29 DIAGNOSIS — Z23 NEED FOR VACCINATION: Primary | ICD-10-CM

## 2021-03-29 PROCEDURE — 0002A SARSCOV2 VAC 30MCG/0.3ML IM: CPT

## 2021-04-07 DIAGNOSIS — I10 ESSENTIAL HYPERTENSION: ICD-10-CM

## 2021-04-07 DIAGNOSIS — R00.0 TACHYCARDIA: ICD-10-CM

## 2021-04-07 DIAGNOSIS — E03.9 ACQUIRED HYPOTHYROIDISM: ICD-10-CM

## 2021-04-08 RX ORDER — LISINOPRIL 5 MG/1
5 TABLET ORAL DAILY
Qty: 90 TABLET | Refills: 1 | Status: SHIPPED | OUTPATIENT
Start: 2021-04-08 | End: 2021-10-04

## 2021-04-08 RX ORDER — LEVOTHYROXINE SODIUM 0.05 MG/1
50 TABLET ORAL
Qty: 90 TABLET | Refills: 1 | Status: SHIPPED | OUTPATIENT
Start: 2021-04-08 | End: 2021-10-04

## 2021-04-08 NOTE — TELEPHONE ENCOUNTER
Metoprolol Tartrate    Last OV relevant to medication: 9/10/20  Last refill date: 10/21/20     #/refills: 90/1  When pt was asked to return for OV: 6 months  Upcoming appt/reason: none schedule  Was pt informed of any over due labs: n/a    Lisinopril    [place]

## 2021-07-20 DIAGNOSIS — E78.5 DYSLIPIDEMIA: ICD-10-CM

## 2021-07-20 RX ORDER — ATORVASTATIN CALCIUM 40 MG/1
40 TABLET, FILM COATED ORAL NIGHTLY
Qty: 90 TABLET | Refills: 0 | Status: SHIPPED | OUTPATIENT
Start: 2021-07-20 | End: 2021-11-09

## 2021-08-31 ENCOUNTER — OFFICE VISIT (OUTPATIENT)
Dept: INTERNAL MEDICINE CLINIC | Facility: CLINIC | Age: 76
End: 2021-08-31
Payer: MEDICARE

## 2021-08-31 VITALS
BODY MASS INDEX: 33.82 KG/M2 | SYSTOLIC BLOOD PRESSURE: 112 MMHG | WEIGHT: 203 LBS | DIASTOLIC BLOOD PRESSURE: 68 MMHG | RESPIRATION RATE: 16 BRPM | TEMPERATURE: 98 F | HEART RATE: 72 BPM | OXYGEN SATURATION: 98 % | HEIGHT: 64.92 IN

## 2021-08-31 DIAGNOSIS — E03.9 ACQUIRED HYPOTHYROIDISM: ICD-10-CM

## 2021-08-31 DIAGNOSIS — I65.23 BILATERAL CAROTID ARTERY STENOSIS: ICD-10-CM

## 2021-08-31 DIAGNOSIS — Z12.11 COLON CANCER SCREENING: ICD-10-CM

## 2021-08-31 DIAGNOSIS — E78.5 DYSLIPIDEMIA: ICD-10-CM

## 2021-08-31 DIAGNOSIS — I70.0 ATHEROSCLEROSIS OF AORTA (HCC): ICD-10-CM

## 2021-08-31 DIAGNOSIS — M47.816 LUMBAR SPONDYLOSIS: ICD-10-CM

## 2021-08-31 DIAGNOSIS — I77.1 TORTUOUS AORTA (HCC): ICD-10-CM

## 2021-08-31 DIAGNOSIS — Z00.00 ENCOUNTER FOR ANNUAL HEALTH EXAMINATION: Primary | ICD-10-CM

## 2021-08-31 DIAGNOSIS — Z86.73 HISTORY OF STROKE: ICD-10-CM

## 2021-08-31 DIAGNOSIS — I47.1 SVT (SUPRAVENTRICULAR TACHYCARDIA) (HCC): ICD-10-CM

## 2021-08-31 DIAGNOSIS — R73.02 IGT (IMPAIRED GLUCOSE TOLERANCE): ICD-10-CM

## 2021-08-31 DIAGNOSIS — R87.619 ABNORMAL CERVICAL PAPANICOLAOU SMEAR, UNSPECIFIED ABNORMAL PAP FINDING: ICD-10-CM

## 2021-08-31 DIAGNOSIS — M47.812 CERVICAL SPONDYLOSIS: ICD-10-CM

## 2021-08-31 DIAGNOSIS — I10 ESSENTIAL HYPERTENSION: ICD-10-CM

## 2021-08-31 DIAGNOSIS — Z12.31 BREAST CANCER SCREENING BY MAMMOGRAM: ICD-10-CM

## 2021-08-31 PROCEDURE — 3074F SYST BP LT 130 MM HG: CPT | Performed by: INTERNAL MEDICINE

## 2021-08-31 PROCEDURE — 3008F BODY MASS INDEX DOCD: CPT | Performed by: INTERNAL MEDICINE

## 2021-08-31 PROCEDURE — 3078F DIAST BP <80 MM HG: CPT | Performed by: INTERNAL MEDICINE

## 2021-08-31 PROCEDURE — 99397 PER PM REEVAL EST PAT 65+ YR: CPT | Performed by: INTERNAL MEDICINE

## 2021-08-31 PROCEDURE — 96160 PT-FOCUSED HLTH RISK ASSMT: CPT | Performed by: INTERNAL MEDICINE

## 2021-08-31 PROCEDURE — G0439 PPPS, SUBSEQ VISIT: HCPCS | Performed by: INTERNAL MEDICINE

## 2021-08-31 NOTE — PATIENT INSTRUCTIONS
Dru Nieto's SCREENING SCHEDULE   Tests on this list are recommended by your physician but may not be covered, or covered at this frequency, by your insurer. Please check with your insurance carrier before scheduling to verify coverage.    PREVENTATI at this time   Pap and Pelvic    Pap   Covered every 2 years for women at normal risk;  Annually if at high risk -  No recommendations at this time    Chlamydia Annually if high risk -  No recommendations at this time   Screening Mammogram    Mammogram [employer]. This site has a lot of good information including definitions of the different types of Advance Directives.  It also has the State forms available on it's website for anyone to review and print using their home computer and p

## 2021-08-31 NOTE — PROGRESS NOTES
HPI:   Darlene Montalvo is a 64year old female who presents for a MA (Medicare Advantage) Supervisit (Once per calendar year). , she sees dr Melissa Major for her well woman to f/ou on abnormal paps and was just tested in January w/dr alvarenga.  She has some pers 09/24/2020    ALT 14 09/24/2020    CA 9.0 09/24/2020    ALB 3.7 09/24/2020    TSH 2.34 08/30/2019    CREATSERUM 0.81 09/24/2020     (H) 09/24/2020        CBC  (most recent labs)   Lab Results   Component Value Date    WBC 11.6 04/09/2016    HGB 12. 1 (6/28/2011), Nail fungus (8/09), OAB (overactive bladder) (9/22/2014), Obesity, Onychomycosis (8/27/09), Pain in shin (11/10), Pure hypercholesterolemia (9/22/2014), Stroke ([facility] [address] (11/23/2015), Tachyarrhythmia (8/5/2016), TIA (transient ischemic attack) (11 denies headaches, n o TIA symptoms  PSYCHE: denies depression or anxiety  HEMATOLOGIC: denies bruising, bleeding, drenching night sweats  ENDOCRINE: HPI  ALL/ASTHMA: denies hx of allergy or asthma    EXAM:   /68 (BP Location: Left arm, Patient Lise Meter Free 10/30/2014, 10/12/2015, 12/14/2016   • Kenalog Per 10mg Inj 06/21/2017, 06/21/2017   • Pneumococcal (Prevnar 13) 08/05/2016   • Pneumovax 23 09/01/2008   • Zoster (Shingles) 08/27/2009       ASSESSMENT AND OTHER RELEVANT CHRONIC CONDITIONS:   Ute Puentes instructed to get our office a copy of it for scanning into Epic           PLAN:  The patient indicates understanding of these issues and agrees to the plan. No follow-ups on file.      Ema Carrillo MD, 10/9/2017     General [facility]     In the  Scoring: 3          Depression Screening (PHQ-2/PHQ-9): Over the LAST 2 WEEKS            Depression Screening (PHQ-2/PHQ-9): Over the LAST 2 WEEKS   Little interest or pleasure in doing things: Not at all    Feeling down, depressed, or hopeless: Not at all results found for this or any previous visit. No flowsheet data found.     Pap and Pelvic      Pap: Every 3 yrs age 21-65 or Pap+HPV every 5 yrs age 33-67, age 72 and older at high risk No recommendations at this time Update Health Maintenance if applicab (mg/dL)   Date Value   09/24/2020 21    No flowsheet data found. Drug Serum Conc  Annually No results found for: DIGOXIN, DIG, VALP No flowsheet data found.     Diabetes      HgbA1C  Annually HEMOGLOBIN A1c (% of total Hgb)   Date Value   12/01/2016 5.8

## 2021-09-02 PROBLEM — M47.816 LUMBAR SPONDYLOSIS: Status: ACTIVE | Noted: 2021-09-02

## 2021-09-04 ENCOUNTER — LAB ENCOUNTER (OUTPATIENT)
Dept: LAB | Facility: HOSPITAL | Age: 76
End: 2021-09-04
Attending: INTERNAL MEDICINE
Payer: MEDICARE

## 2021-09-04 DIAGNOSIS — Z12.11 SPECIAL SCREENING FOR MALIGNANT NEOPLASM OF COLON: ICD-10-CM

## 2021-09-05 LAB — SARS-COV-2 RNA RESP QL NAA+PROBE: NOT DETECTED

## 2021-09-07 ENCOUNTER — HOSPITAL ENCOUNTER (OUTPATIENT)
Facility: HOSPITAL | Age: 76
Setting detail: HOSPITAL OUTPATIENT SURGERY
Discharge: HOME | End: 2021-09-07
Attending: INTERNAL MEDICINE | Admitting: INTERNAL MEDICINE
Payer: MEDICARE

## 2021-09-07 ENCOUNTER — ANESTHESIA (OUTPATIENT)
Dept: ENDOSCOPY | Facility: HOSPITAL | Age: 76
End: 2021-09-07
Payer: MEDICARE

## 2021-09-07 ENCOUNTER — ANESTHESIA EVENT (OUTPATIENT)
Dept: ENDOSCOPY | Facility: HOSPITAL | Age: 76
End: 2021-09-07
Payer: MEDICARE

## 2021-09-07 VITALS
HEART RATE: 58 BPM | OXYGEN SATURATION: 100 % | DIASTOLIC BLOOD PRESSURE: 68 MMHG | TEMPERATURE: 97 F | BODY MASS INDEX: 33.82 KG/M2 | WEIGHT: 203 LBS | SYSTOLIC BLOOD PRESSURE: 144 MMHG | HEIGHT: 65 IN | RESPIRATION RATE: 18 BRPM

## 2021-09-07 DIAGNOSIS — Z12.11 SPECIAL SCREENING FOR MALIGNANT NEOPLASM OF COLON: Primary | ICD-10-CM

## 2021-09-07 PROCEDURE — 0DJD8ZZ INSPECTION OF LOWER INTESTINAL TRACT, VIA NATURAL OR ARTIFICIAL OPENING ENDOSCOPIC: ICD-10-PCS | Performed by: INTERNAL MEDICINE

## 2021-09-07 RX ORDER — SODIUM CHLORIDE, SODIUM LACTATE, POTASSIUM CHLORIDE, CALCIUM CHLORIDE 600; 310; 30; 20 MG/100ML; MG/100ML; MG/100ML; MG/100ML
INJECTION, SOLUTION INTRAVENOUS CONTINUOUS
Status: DISCONTINUED | OUTPATIENT
Start: 2021-09-07 | End: 2021-09-07

## 2021-09-07 RX ADMIN — SODIUM CHLORIDE, SODIUM LACTATE, POTASSIUM CHLORIDE, CALCIUM CHLORIDE: 600; 310; 30; 20 INJECTION, SOLUTION INTRAVENOUS at 13:49:00

## 2021-09-07 NOTE — H&P
[address] [facility] Patient Status:  Hospital Outpatient Surgery    1945 MRN [id number]   Location [address] [place] [address] Attending Jojo Gary MD   Date 2021 PCP Amalia Yañez MD     CC • Stroke ([facility] [address] 11/23/2015    TIA   • Tachyarrhythmia 8/5/2016    Presumed, not documetned, started low dose BB 7/16   • TIA (transient ischemic attack) 11/15   • Trochanteric bursitis 11/10   • Visual impairment     glasses   • Wears glasses 1960     Pas cervix     Essential hypertension     IGT (impaired glucose tolerance)     Carotid disease, bilateral (HCC)     SVT (supraventricular tachycardia) ([address]     Atherosclerosis of aorta ([address]     Tortuous aorta (HCC)     Cervical spondylosis     History of str

## 2021-09-07 NOTE — OPERATIVE REPORT
[address] Patient Status:  Hospital Outpatient Surgery    1945 MRN [id number]   Location [address] [place] [address] Attending Bao Shoemaker MD   Date 2021 PCP Julio Jimeenz MD     PREOPERATIVE DIAGNOSIS/INDICATION:

## 2021-09-07 NOTE — ANESTHESIA POSTPROCEDURE EVALUATION
[address] [facility] Patient Status:  Hospital Outpatient Surgery   Age/Gender 64year old female MRN [id number]   Location [address] [place] [address] Attending Neyda Lorenzo, [address] [facility] Day # 0 PCP Gosia Rashid MD

## 2021-09-07 NOTE — ANESTHESIA PREPROCEDURE EVALUATION
PRE-OP EVALUATION    Patient Name: Mike Reeves    Admit Diagnosis: Special screening for malignant neoplasm of colon [Z12.11]    Pre-op Diagnosis: Special screening for malignant neoplasm of colon [Z12.11]    COLONOSCOPY    Anesthesia Procedure: [address] [place] [address] 2005    Both eyes   • COLONOSCOPY      :   • LEEP     •        Social History    Tobacco Use      Smoking status: Former Smoker        Packs/day: 0.25        Years: 10.00        Pack years: 2.5        Types: Cigarettes        Quit date: 1/10

## 2021-09-17 ENCOUNTER — LAB ENCOUNTER (OUTPATIENT)
Dept: LAB | Age: 76
End: 2021-09-17
Attending: INTERNAL MEDICINE
Payer: MEDICARE

## 2021-09-17 LAB
ALBUMIN SERPL-MCNC: 3.2 G/DL (ref 3.4–5)
ALBUMIN/GLOB SERPL: 0.9 {RATIO} (ref 1–2)
ALP LIVER SERPL-CCNC: 98 U/L
ALT SERPL-CCNC: 19 U/L
ANION GAP SERPL CALC-SCNC: 4 MMOL/L (ref 0–18)
AST SERPL-CCNC: 16 U/L (ref 15–37)
BASOPHILS # BLD AUTO: 0.03 X10(3) UL (ref 0–0.2)
BASOPHILS NFR BLD AUTO: 0.3 %
BILIRUB SERPL-MCNC: 0.5 MG/DL (ref 0.1–2)
BUN BLD-MCNC: 20 MG/DL (ref 7–18)
CALCIUM BLD-MCNC: 8.8 MG/DL (ref 8.5–10.1)
CHLORIDE SERPL-SCNC: 111 MMOL/L (ref 98–112)
CHOLEST SERPL-MCNC: 151 MG/DL (ref ?–200)
CO2 SERPL-SCNC: 27 MMOL/L (ref 21–32)
CREAT BLD-MCNC: 0.86 MG/DL
EOSINOPHIL # BLD AUTO: 0.26 X10(3) UL (ref 0–0.7)
EOSINOPHIL NFR BLD AUTO: 2.9 %
ERYTHROCYTE [DISTWIDTH] IN BLOOD BY AUTOMATED COUNT: 12.6 %
EST. AVERAGE GLUCOSE BLD GHB EST-MCNC: 143 MG/DL (ref 68–126)
GLOBULIN PLAS-MCNC: 3.4 G/DL (ref 2.8–4.4)
GLUCOSE BLD-MCNC: 96 MG/DL (ref 70–99)
HBA1C MFR BLD HPLC: 6.6 % (ref ?–5.7)
HCT VFR BLD AUTO: 41.8 %
HDLC SERPL-MCNC: 58 MG/DL (ref 40–59)
HGB BLD-MCNC: 12.9 G/DL
IMM GRANULOCYTES # BLD AUTO: 0.03 X10(3) UL (ref 0–1)
IMM GRANULOCYTES NFR BLD: 0.3 %
LDLC SERPL CALC-MCNC: 77 MG/DL (ref ?–100)
LYMPHOCYTES # BLD AUTO: 3.02 X10(3) UL (ref 1–4)
LYMPHOCYTES NFR BLD AUTO: 33.4 %
MCH RBC QN AUTO: 28.3 PG (ref 26–34)
MCHC RBC AUTO-ENTMCNC: 30.9 G/DL (ref 31–37)
MCV RBC AUTO: 91.7 FL
MONOCYTES # BLD AUTO: 0.87 X10(3) UL (ref 0.1–1)
MONOCYTES NFR BLD AUTO: 9.6 %
NEUTROPHILS # BLD AUTO: 4.82 X10 (3) UL (ref 1.5–7.7)
NEUTROPHILS # BLD AUTO: 4.82 X10(3) UL (ref 1.5–7.7)
NEUTROPHILS NFR BLD AUTO: 53.5 %
NONHDLC SERPL-MCNC: 93 MG/DL (ref ?–130)
OSMOLALITY SERPL CALC.SUM OF ELEC: 296 MOSM/KG (ref 275–295)
PATIENT FASTING Y/N/NP: YES
PATIENT FASTING Y/N/NP: YES
PLATELET # BLD AUTO: 266 10(3)UL (ref 150–450)
POTASSIUM SERPL-SCNC: 4.4 MMOL/L (ref 3.5–5.1)
PROT SERPL-MCNC: 6.6 G/DL (ref 6.4–8.2)
RBC # BLD AUTO: 4.56 X10(6)UL
SODIUM SERPL-SCNC: 142 MMOL/L (ref 136–145)
TRIGL SERPL-MCNC: 86 MG/DL (ref 30–149)
TSI SER-ACNC: 2.79 MIU/ML (ref 0.36–3.74)
VLDLC SERPL CALC-MCNC: 13 MG/DL (ref 0–30)
WBC # BLD AUTO: 9 X10(3) UL (ref 4–11)

## 2021-09-17 PROCEDURE — 85025 COMPLETE CBC W/AUTO DIFF WBC: CPT | Performed by: INTERNAL MEDICINE

## 2021-09-17 PROCEDURE — 83036 HEMOGLOBIN GLYCOSYLATED A1C: CPT | Performed by: INTERNAL MEDICINE

## 2021-09-17 PROCEDURE — 36415 COLL VENOUS BLD VENIPUNCTURE: CPT | Performed by: INTERNAL MEDICINE

## 2021-09-17 PROCEDURE — 80061 LIPID PANEL: CPT | Performed by: INTERNAL MEDICINE

## 2021-09-17 PROCEDURE — 80053 COMPREHEN METABOLIC PANEL: CPT | Performed by: INTERNAL MEDICINE

## 2021-09-17 PROCEDURE — 84443 ASSAY THYROID STIM HORMONE: CPT | Performed by: INTERNAL MEDICINE

## 2021-09-28 ENCOUNTER — TELEPHONE (OUTPATIENT)
Dept: OBGYN CLINIC | Facility: CLINIC | Age: 76
End: 2021-09-28

## 2021-10-01 ENCOUNTER — HOSPITAL ENCOUNTER (OUTPATIENT)
Dept: ULTRASOUND IMAGING | Facility: HOSPITAL | Age: 76
Discharge: HOME | End: 2021-10-01
Attending: NURSE PRACTITIONER
Payer: MEDICARE

## 2021-10-01 ENCOUNTER — OFFICE VISIT (OUTPATIENT)
Dept: INTERNAL MEDICINE CLINIC | Facility: CLINIC | Age: 76
End: 2021-10-01
Payer: MEDICARE

## 2021-10-01 ENCOUNTER — TELEPHONE (OUTPATIENT)
Dept: INTERNAL MEDICINE CLINIC | Facility: CLINIC | Age: 76
End: 2021-10-01

## 2021-10-01 VITALS
SYSTOLIC BLOOD PRESSURE: 120 MMHG | WEIGHT: 202.19 LBS | HEART RATE: 68 BPM | BODY MASS INDEX: 33.69 KG/M2 | OXYGEN SATURATION: 95 % | TEMPERATURE: 97 F | RESPIRATION RATE: 16 BRPM | HEIGHT: 65 IN | DIASTOLIC BLOOD PRESSURE: 64 MMHG

## 2021-10-01 DIAGNOSIS — M79.605 LEFT LEG PAIN: ICD-10-CM

## 2021-10-01 DIAGNOSIS — Z23 NEED FOR VACCINATION: ICD-10-CM

## 2021-10-01 DIAGNOSIS — M79.605 LEFT LEG PAIN: Primary | ICD-10-CM

## 2021-10-01 PROCEDURE — 3078F DIAST BP <80 MM HG: CPT | Performed by: NURSE PRACTITIONER

## 2021-10-01 PROCEDURE — 99214 OFFICE O/P EST MOD 30 MIN: CPT | Performed by: NURSE PRACTITIONER

## 2021-10-01 PROCEDURE — G0008 ADMIN INFLUENZA VIRUS VAC: HCPCS | Performed by: NURSE PRACTITIONER

## 2021-10-01 PROCEDURE — 3074F SYST BP LT 130 MM HG: CPT | Performed by: NURSE PRACTITIONER

## 2021-10-01 PROCEDURE — 93971 EXTREMITY STUDY: CPT | Performed by: NURSE PRACTITIONER

## 2021-10-01 PROCEDURE — 3008F BODY MASS INDEX DOCD: CPT | Performed by: NURSE PRACTITIONER

## 2021-10-01 PROCEDURE — 90662 IIV NO PRSV INCREASED AG IM: CPT | Performed by: NURSE PRACTITIONER

## 2021-10-01 NOTE — TELEPHONE ENCOUNTER
Spoke with pt  Stated having Left leg pain,currently on side of the leg  Also hurts in the back of the knee when walks  Started 2 days ago  Stated feels some Numbness to the side of the leg    Last night pain was 4-5/10,  Kept her up at night so took aspir

## 2021-10-01 NOTE — PATIENT INSTRUCTIONS
Get your left leg ultrasound completed    Continue to monitor your pain closely.      If it does not improve follow up as needed
normal...

## 2021-10-01 NOTE — PROGRESS NOTES
Ubaldo Varghese is a 64year old female. CHIEF COMPLAINT   Left leg pain    HPI:   Leg pain started behind left thigh and side of calve two days ago. Was more difficult to walk. Pain was moderate. Took aspirin on a couple of occasions. Today is better.  Stil tendinitis adjacent to the right greater trochanter. lower lumbar spine and lumbosacral transition degenerative changes.  3 mm phlebolith to eht right of L4    • History of stroke 8/31/2021    lacunar   • Hyperlipidemia    • Hyperthyroidism 8/27/09   • Hypo without murmur  GI: good BS's,no masses, organomegaly or tenderness  MUSCULOSKELETAL: No obvious joint deformity or swelling. Full strength and ROM to the extremities. Normal gait. EXTREMITIES: no cyanosis, clubbing. Trace <1+ pitting edema to Left leg. continue to monitor pain for resolution. - if does not continue to improve follow up as needed    - [facility] (CPT=[id number]); Future    2.  Need for vaccination  - FLU VACC HIGH DOSE PRSV FREE    The patient indicates understandin

## 2021-10-01 NOTE — TELEPHONE ENCOUNTER
Spoke with pt  Appt was offered to come in for evaluation with Alba Melendez agreed, appt made for today

## 2021-10-02 DIAGNOSIS — E03.9 ACQUIRED HYPOTHYROIDISM: ICD-10-CM

## 2021-10-02 DIAGNOSIS — R00.0 TACHYCARDIA: ICD-10-CM

## 2021-10-02 DIAGNOSIS — I10 ESSENTIAL HYPERTENSION: ICD-10-CM

## 2021-10-04 RX ORDER — LISINOPRIL 5 MG/1
TABLET ORAL
Qty: 90 TABLET | Refills: 1 | Status: SHIPPED | OUTPATIENT
Start: 2021-10-04

## 2021-10-04 RX ORDER — LEVOTHYROXINE SODIUM 0.05 MG/1
TABLET ORAL
Qty: 90 TABLET | Refills: 1 | Status: SHIPPED | OUTPATIENT
Start: 2021-10-04

## 2021-11-01 ENCOUNTER — HOSPITAL ENCOUNTER (OUTPATIENT)
Dept: GENERAL RADIOLOGY | Age: 76
Discharge: HOME | End: 2021-11-01
Attending: INTERNAL MEDICINE
Payer: MEDICARE

## 2021-11-01 ENCOUNTER — OFFICE VISIT (OUTPATIENT)
Dept: INTERNAL MEDICINE CLINIC | Facility: CLINIC | Age: 76
End: 2021-11-01
Payer: MEDICARE

## 2021-11-01 VITALS
DIASTOLIC BLOOD PRESSURE: 80 MMHG | RESPIRATION RATE: 16 BRPM | WEIGHT: 206.81 LBS | BODY MASS INDEX: 34.46 KG/M2 | HEIGHT: 65 IN | SYSTOLIC BLOOD PRESSURE: 146 MMHG | HEART RATE: 87 BPM | OXYGEN SATURATION: 99 % | TEMPERATURE: 98 F

## 2021-11-01 DIAGNOSIS — Z01.89 ENCOUNTER FOR LOWER EXTREMITY COMPARISON IMAGING STUDY: ICD-10-CM

## 2021-11-01 DIAGNOSIS — S89.92XA INJURY OF LEFT KNEE, INITIAL ENCOUNTER: ICD-10-CM

## 2021-11-01 DIAGNOSIS — S89.92XA INJURY OF LEFT KNEE, INITIAL ENCOUNTER: Primary | ICD-10-CM

## 2021-11-01 PROCEDURE — 73564 X-RAY EXAM KNEE 4 OR MORE: CPT | Performed by: INTERNAL MEDICINE

## 2021-11-01 PROCEDURE — 3079F DIAST BP 80-89 MM HG: CPT | Performed by: INTERNAL MEDICINE

## 2021-11-01 PROCEDURE — 3008F BODY MASS INDEX DOCD: CPT | Performed by: INTERNAL MEDICINE

## 2021-11-01 PROCEDURE — 99213 OFFICE O/P EST LOW 20 MIN: CPT | Performed by: INTERNAL MEDICINE

## 2021-11-01 PROCEDURE — 73562 X-RAY EXAM OF KNEE 3: CPT | Performed by: INTERNAL MEDICINE

## 2021-11-01 PROCEDURE — 3077F SYST BP >= 140 MM HG: CPT | Performed by: INTERNAL MEDICINE

## 2021-11-01 RX ORDER — DICLOFENAC SODIUM 75 MG/1
75 TABLET, DELAYED RELEASE ORAL 2 TIMES DAILY WITH MEALS
Qty: 60 TABLET | Refills: 0 | Status: SHIPPED | OUTPATIENT
Start: 2021-11-01

## 2021-11-01 RX ORDER — COVID-19 ANTIGEN TEST
440 KIT MISCELLANEOUS AS NEEDED
COMMUNITY

## 2021-11-01 NOTE — PROGRESS NOTES
Ric Taylor is a 64year old female  Patient presents with:  Knee Pain: Left Knee - pain is better during day but amps u at night time - hard to sleep  Sciatica: Left Side       HPI:   Hurt it left knee getting out of pool and still hurts, dawson at night Yes      Alcohol/week: 0.0 standard drinks      Types: 1 Glasses of wine, 1 Cans of beer per week      Comment: Occasionally    Drug use: No     Patient Active Problem List:     Hypothyroidism     Dyslipidemia     Abnormal Pap smear of cervix     Essential

## 2021-11-03 ENCOUNTER — TELEPHONE (OUTPATIENT)
Dept: ORTHOPEDICS CLINIC | Facility: CLINIC | Age: 76
End: 2021-11-03

## 2021-11-03 NOTE — TELEPHONE ENCOUNTER
Right and left knee xrays are in Epic. Patient's left knee is worse than her right. Please order if additional views are needed. I will follow up with patient and schedule if ordered. Scott you.     Future Appointments   Date Time Provider Livia Arreola

## 2021-11-09 DIAGNOSIS — E78.5 DYSLIPIDEMIA: ICD-10-CM

## 2021-11-09 RX ORDER — ATORVASTATIN CALCIUM 40 MG/1
TABLET, FILM COATED ORAL
Qty: 90 TABLET | Refills: 1 | Status: SHIPPED | OUTPATIENT
Start: 2021-11-09

## 2021-11-17 ENCOUNTER — OFFICE VISIT (OUTPATIENT)
Dept: ORTHOPEDICS CLINIC | Facility: CLINIC | Age: 76
End: 2021-11-17
Payer: MEDICARE

## 2021-11-17 DIAGNOSIS — M17.0 PRIMARY OSTEOARTHRITIS OF BOTH KNEES: ICD-10-CM

## 2021-11-17 DIAGNOSIS — M54.16 LUMBAR RADICULOPATHY: Primary | ICD-10-CM

## 2021-11-17 DIAGNOSIS — M47.816 ARTHRITIS OF LUMBAR SPINE: ICD-10-CM

## 2021-11-17 PROCEDURE — 99203 OFFICE O/P NEW LOW 30 MIN: CPT | Performed by: ORTHOPAEDIC SURGERY

## 2021-11-17 RX ORDER — METHYLPREDNISOLONE 4 MG/1
TABLET ORAL
Qty: 1 EACH | Refills: 0 | Status: SHIPPED | OUTPATIENT
Start: 2021-11-17

## 2021-11-17 NOTE — PROGRESS NOTES
[facility] New Consult    CC: Patient presents with:  Knee Pain: Patient is here today for bilateral knee pain left greater than right. Patient states that the pain is mostly in the left knee.        HPI: The patient is a 64year old female refe habits    • Kidney stones 4/4/2016   • Lacunar infarction ([facility] [address] 11/15/2015   • Lipid screening 10/9/12   • Lipid screening 6/28/2011   • Nail fungus 8/09    great toe, right foot   • OAB (overactive bladder) 9/22/2014   • Obesity    • Onychomycosis 8/27/09 [Other]) Mother    • Heart Disorder Mother         Congestive heart failure   • Other (TB [Other]) Maternal Grandmother    • Other (MI, [Other]) Other    • Other (CABG [Other]) Brother    • Other (HTN,hypercholesterolemia [Other]) Other    • Other (CVA [Ot reports subjective diminished sensation to light touch at the anterior lateral calf region on the left normal on the right. Neurovascular status is intact on sensory, motor and perfusion assessment distally.     Imaging: Multiple views of the right and lef

## 2021-12-02 ENCOUNTER — TELEPHONE (OUTPATIENT)
Dept: INTERNAL MEDICINE CLINIC | Facility: CLINIC | Age: 76
End: 2021-12-02

## 2021-12-02 NOTE — TELEPHONE ENCOUNTER
Received results for the following tests: MAMMOGRAM completed on 12/1/21 at [facility]  Updated HM. To Dr. Bettina Staley for review.

## 2021-12-02 NOTE — TELEPHONE ENCOUNTER
Incoming (mail or fax):   Fax  Received from:  [facility]  Documentation given to:  Dr. Linda Neumann Results bin    Radiology report- MA Mammogram Screen Digital

## 2021-12-09 ENCOUNTER — MED REC SCAN ONLY (OUTPATIENT)
Dept: INTERNAL MEDICINE CLINIC | Facility: CLINIC | Age: 76
End: 2021-12-09

## 2022-03-12 ENCOUNTER — HOSPITAL ENCOUNTER (OUTPATIENT)
Facility: HOSPITAL | Age: 77
Setting detail: OBSERVATION
Discharge: HOME | End: 2022-03-13
Attending: EMERGENCY MEDICINE | Admitting: HOSPITALIST
Payer: MEDICARE

## 2022-03-12 ENCOUNTER — APPOINTMENT (OUTPATIENT)
Dept: ULTRASOUND IMAGING | Facility: HOSPITAL | Age: 77
End: 2022-03-12
Attending: EMERGENCY MEDICINE
Payer: MEDICARE

## 2022-03-12 DIAGNOSIS — N93.9 VAGINAL BLEEDING: Primary | ICD-10-CM

## 2022-03-12 LAB
ALBUMIN SERPL-MCNC: 3.3 G/DL (ref 3.4–5)
ALBUMIN/GLOB SERPL: 1 {RATIO} (ref 1–2)
ALP LIVER SERPL-CCNC: 113 U/L
ALT SERPL-CCNC: 23 U/L
ANION GAP SERPL CALC-SCNC: 2 MMOL/L (ref 0–18)
ANTIBODY SCREEN: NEGATIVE
APTT PPP: 29.5 SECONDS (ref 23.3–35.6)
AST SERPL-CCNC: 23 U/L (ref 15–37)
BASOPHILS # BLD AUTO: 0.03 X10(3) UL (ref 0–0.2)
BASOPHILS NFR BLD AUTO: 0.3 %
BILIRUB SERPL-MCNC: 0.3 MG/DL (ref 0.1–2)
BUN BLD-MCNC: 23 MG/DL (ref 7–18)
CALCIUM BLD-MCNC: 8.4 MG/DL (ref 8.5–10.1)
CHLORIDE SERPL-SCNC: 110 MMOL/L (ref 98–112)
CO2 SERPL-SCNC: 27 MMOL/L (ref 21–32)
CREAT BLD-MCNC: 0.9 MG/DL
EOSINOPHIL # BLD AUTO: 0.17 X10(3) UL (ref 0–0.7)
EOSINOPHIL NFR BLD AUTO: 1.6 %
GLOBULIN PLAS-MCNC: 3.4 G/DL (ref 2.8–4.4)
GLUCOSE BLD-MCNC: 113 MG/DL (ref 70–99)
HCT VFR BLD AUTO: 37.8 %
HGB BLD-MCNC: 12.6 G/DL
IMM GRANULOCYTES # BLD AUTO: 0.02 X10(3) UL (ref 0–1)
IMM GRANULOCYTES NFR BLD: 0.2 %
INR BLD: 0.98 (ref 0.8–1.2)
LYMPHOCYTES # BLD AUTO: 3.72 X10(3) UL (ref 1–4)
LYMPHOCYTES NFR BLD AUTO: 34 %
MCH RBC QN AUTO: 29.7 PG (ref 26–34)
MCHC RBC AUTO-ENTMCNC: 33.3 G/DL (ref 31–37)
MCV RBC AUTO: 89.2 FL
MONOCYTES # BLD AUTO: 1.02 X10(3) UL (ref 0.1–1)
MONOCYTES NFR BLD AUTO: 9.3 %
NEUTROPHILS # BLD AUTO: 5.97 X10 (3) UL (ref 1.5–7.7)
NEUTROPHILS # BLD AUTO: 5.97 X10(3) UL (ref 1.5–7.7)
NEUTROPHILS NFR BLD AUTO: 54.6 %
OSMOLALITY SERPL CALC.SUM OF ELEC: 292 MOSM/KG (ref 275–295)
PLATELET # BLD AUTO: 241 10(3)UL (ref 150–450)
POTASSIUM SERPL-SCNC: 4.1 MMOL/L (ref 3.5–5.1)
PROT SERPL-MCNC: 6.7 G/DL (ref 6.4–8.2)
PROTHROMBIN TIME: 13 SECONDS (ref 11.6–14.8)
RBC # BLD AUTO: 4.24 X10(6)UL
RH BLOOD TYPE: NEGATIVE
SODIUM SERPL-SCNC: 139 MMOL/L (ref 136–145)
WBC # BLD AUTO: 10.9 X10(3) UL (ref 4–11)

## 2022-03-12 PROCEDURE — 76856 US EXAM PELVIC COMPLETE: CPT | Performed by: EMERGENCY MEDICINE

## 2022-03-12 PROCEDURE — 76830 TRANSVAGINAL US NON-OB: CPT | Performed by: EMERGENCY MEDICINE

## 2022-03-12 PROCEDURE — 93975 VASCULAR STUDY: CPT | Performed by: EMERGENCY MEDICINE

## 2022-03-12 RX ORDER — SODIUM CHLORIDE 9 MG/ML
1000 INJECTION, SOLUTION INTRAVENOUS ONCE
Status: COMPLETED | OUTPATIENT
Start: 2022-03-12 | End: 2022-03-13

## 2022-03-13 ENCOUNTER — ANESTHESIA (OUTPATIENT)
Dept: SURGERY | Facility: HOSPITAL | Age: 77
End: 2022-03-13
Payer: MEDICARE

## 2022-03-13 ENCOUNTER — ANESTHESIA EVENT (OUTPATIENT)
Dept: SURGERY | Facility: HOSPITAL | Age: 77
End: 2022-03-13
Payer: MEDICARE

## 2022-03-13 VITALS
SYSTOLIC BLOOD PRESSURE: 154 MMHG | OXYGEN SATURATION: 96 % | DIASTOLIC BLOOD PRESSURE: 83 MMHG | RESPIRATION RATE: 16 BRPM | HEIGHT: 65 IN | BODY MASS INDEX: 33.32 KG/M2 | TEMPERATURE: 98 F | HEART RATE: 71 BPM | WEIGHT: 200 LBS

## 2022-03-13 PROBLEM — R79.89 AZOTEMIA: Status: ACTIVE | Noted: 2022-03-13

## 2022-03-13 PROBLEM — N93.9 VAGINAL BLEEDING: Status: ACTIVE | Noted: 2022-03-13

## 2022-03-13 PROBLEM — R73.9 HYPERGLYCEMIA: Status: ACTIVE | Noted: 2022-03-13

## 2022-03-13 LAB
ANION GAP SERPL CALC-SCNC: 5 MMOL/L (ref 0–18)
BASOPHILS # BLD AUTO: 0.02 X10(3) UL (ref 0–0.2)
BASOPHILS NFR BLD AUTO: 0.2 %
BILIRUB UR QL STRIP.AUTO: NEGATIVE
BUN BLD-MCNC: 19 MG/DL (ref 7–18)
CALCIUM BLD-MCNC: 8.1 MG/DL (ref 8.5–10.1)
CHLORIDE SERPL-SCNC: 113 MMOL/L (ref 98–112)
CLARITY UR REFRACT.AUTO: CLEAR
CO2 SERPL-SCNC: 25 MMOL/L (ref 21–32)
COLOR UR AUTO: YELLOW
CREAT BLD-MCNC: 0.64 MG/DL
DEPRECATED HBV CORE AB SER IA-ACNC: 88.5 NG/ML
EOSINOPHIL # BLD AUTO: 0.14 X10(3) UL (ref 0–0.7)
EOSINOPHIL NFR BLD AUTO: 1.5 %
ERYTHROCYTE [DISTWIDTH] IN BLOOD BY AUTOMATED COUNT: 12.3 %
GLUCOSE BLD-MCNC: 100 MG/DL (ref 70–99)
GLUCOSE UR STRIP.AUTO-MCNC: NEGATIVE MG/DL
HCT VFR BLD AUTO: 35.1 %
HGB BLD-MCNC: 11.2 G/DL
IMM GRANULOCYTES # BLD AUTO: 0.02 X10(3) UL (ref 0–1)
IMM GRANULOCYTES NFR BLD: 0.2 %
IRON SERPL-MCNC: 35 UG/DL
KETONES UR STRIP.AUTO-MCNC: NEGATIVE MG/DL
LYMPHOCYTES # BLD AUTO: 2.95 X10(3) UL (ref 1–4)
LYMPHOCYTES NFR BLD AUTO: 31.3 %
MCH RBC QN AUTO: 29.7 PG (ref 26–34)
MCHC RBC AUTO-ENTMCNC: 31.9 G/DL (ref 31–37)
MCV RBC AUTO: 93.1 FL
MONOCYTES # BLD AUTO: 0.96 X10(3) UL (ref 0.1–1)
MONOCYTES NFR BLD AUTO: 10.2 %
NEUTROPHILS # BLD AUTO: 5.33 X10 (3) UL (ref 1.5–7.7)
NEUTROPHILS # BLD AUTO: 5.33 X10(3) UL (ref 1.5–7.7)
NEUTROPHILS NFR BLD AUTO: 56.6 %
NITRITE UR QL STRIP.AUTO: NEGATIVE
OSMOLALITY SERPL CALC.SUM OF ELEC: 298 MOSM/KG (ref 275–295)
PH UR STRIP.AUTO: 5 [PH] (ref 5–8)
PLATELET # BLD AUTO: 210 10(3)UL (ref 150–450)
POTASSIUM SERPL-SCNC: 3.9 MMOL/L (ref 3.5–5.1)
PROT UR STRIP.AUTO-MCNC: NEGATIVE MG/DL
RBC # BLD AUTO: 3.77 X10(6)UL
SARS-COV-2 RNA RESP QL NAA+PROBE: NOT DETECTED
SODIUM SERPL-SCNC: 143 MMOL/L (ref 136–145)
SP GR UR STRIP.AUTO: 1.01 (ref 1–1.03)
T4 FREE SERPL-MCNC: 1.2 NG/DL (ref 0.8–1.7)
TIBC SERPL-MCNC: 249 UG/DL (ref 240–450)
TRANSFERRIN SERPL-MCNC: 167 MG/DL (ref 200–360)
TSI SER-ACNC: 3.33 MIU/ML (ref 0.36–3.74)
UROBILINOGEN UR STRIP.AUTO-MCNC: <2 MG/DL
WBC # BLD AUTO: 9.4 X10(3) UL (ref 4–11)

## 2022-03-13 PROCEDURE — 99220 INITIAL OBSERVATION CARE,LEVL III: CPT | Performed by: HOSPITALIST

## 2022-03-13 PROCEDURE — 99204 OFFICE O/P NEW MOD 45 MIN: CPT | Performed by: OBSTETRICS & GYNECOLOGY

## 2022-03-13 PROCEDURE — 0UDB8ZX EXTRACTION OF ENDOMETRIUM, VIA NATURAL OR ARTIFICIAL OPENING ENDOSCOPIC, DIAGNOSTIC: ICD-10-PCS | Performed by: OBSTETRICS & GYNECOLOGY

## 2022-03-13 PROCEDURE — 58558 HYSTEROSCOPY BIOPSY: CPT | Performed by: OBSTETRICS & GYNECOLOGY

## 2022-03-13 RX ORDER — DIPHENHYDRAMINE HYDROCHLORIDE 50 MG/ML
12.5 INJECTION INTRAMUSCULAR; INTRAVENOUS AS NEEDED
Status: DISCONTINUED | OUTPATIENT
Start: 2022-03-13 | End: 2022-03-13 | Stop reason: HOSPADM

## 2022-03-13 RX ORDER — ACETAMINOPHEN 500 MG
500 TABLET ORAL EVERY 6 HOURS PRN
Status: DISCONTINUED | OUTPATIENT
Start: 2022-03-13 | End: 2022-03-13

## 2022-03-13 RX ORDER — ONDANSETRON 2 MG/ML
4 INJECTION INTRAMUSCULAR; INTRAVENOUS EVERY 4 HOURS PRN
Status: ACTIVE | OUTPATIENT
Start: 2022-03-13 | End: 2022-03-13

## 2022-03-13 RX ORDER — METOCLOPRAMIDE HYDROCHLORIDE 5 MG/ML
10 INJECTION INTRAMUSCULAR; INTRAVENOUS EVERY 8 HOURS PRN
Status: DISCONTINUED | OUTPATIENT
Start: 2022-03-13 | End: 2022-03-13

## 2022-03-13 RX ORDER — SODIUM CHLORIDE, SODIUM LACTATE, POTASSIUM CHLORIDE, CALCIUM CHLORIDE 600; 310; 30; 20 MG/100ML; MG/100ML; MG/100ML; MG/100ML
INJECTION, SOLUTION INTRAVENOUS CONTINUOUS
Status: DISCONTINUED | OUTPATIENT
Start: 2022-03-13 | End: 2022-03-13 | Stop reason: HOSPADM

## 2022-03-13 RX ORDER — MIDAZOLAM HYDROCHLORIDE 1 MG/ML
1 INJECTION INTRAMUSCULAR; INTRAVENOUS EVERY 5 MIN PRN
Status: DISCONTINUED | OUTPATIENT
Start: 2022-03-13 | End: 2022-03-13 | Stop reason: HOSPADM

## 2022-03-13 RX ORDER — MEPERIDINE HYDROCHLORIDE 25 MG/ML
12.5 INJECTION INTRAMUSCULAR; INTRAVENOUS; SUBCUTANEOUS AS NEEDED
Status: DISCONTINUED | OUTPATIENT
Start: 2022-03-13 | End: 2022-03-13 | Stop reason: HOSPADM

## 2022-03-13 RX ORDER — METOCLOPRAMIDE HYDROCHLORIDE 5 MG/ML
10 INJECTION INTRAMUSCULAR; INTRAVENOUS AS NEEDED
Status: DISCONTINUED | OUTPATIENT
Start: 2022-03-13 | End: 2022-03-13 | Stop reason: HOSPADM

## 2022-03-13 RX ORDER — ATORVASTATIN CALCIUM 40 MG/1
40 TABLET, FILM COATED ORAL NIGHTLY
Status: DISCONTINUED | OUTPATIENT
Start: 2022-03-13 | End: 2022-03-13

## 2022-03-13 RX ORDER — ONDANSETRON 2 MG/ML
4 INJECTION INTRAMUSCULAR; INTRAVENOUS EVERY 6 HOURS PRN
Status: DISCONTINUED | OUTPATIENT
Start: 2022-03-13 | End: 2022-03-13

## 2022-03-13 RX ORDER — IBUPROFEN 200 MG
200 TABLET ORAL EVERY 6 HOURS PRN
Status: DISCONTINUED | OUTPATIENT
Start: 2022-03-13 | End: 2022-03-13

## 2022-03-13 RX ORDER — HYDROCODONE BITARTRATE AND ACETAMINOPHEN 5; 325 MG/1; MG/1
1 TABLET ORAL AS NEEDED
Status: DISCONTINUED | OUTPATIENT
Start: 2022-03-13 | End: 2022-03-13 | Stop reason: HOSPADM

## 2022-03-13 RX ORDER — SODIUM CHLORIDE 9 MG/ML
INJECTION, SOLUTION INTRAVENOUS CONTINUOUS
Status: DISCONTINUED | OUTPATIENT
Start: 2022-03-13 | End: 2022-03-13

## 2022-03-13 RX ORDER — SODIUM CHLORIDE 9 MG/ML
INJECTION, SOLUTION INTRAVENOUS CONTINUOUS
Status: ACTIVE | OUTPATIENT
Start: 2022-03-13 | End: 2022-03-13

## 2022-03-13 RX ORDER — HYDROMORPHONE HYDROCHLORIDE 1 MG/ML
0.4 INJECTION, SOLUTION INTRAMUSCULAR; INTRAVENOUS; SUBCUTANEOUS EVERY 5 MIN PRN
Status: DISCONTINUED | OUTPATIENT
Start: 2022-03-13 | End: 2022-03-13 | Stop reason: HOSPADM

## 2022-03-13 RX ORDER — ACETAMINOPHEN 325 MG/1
650 TABLET ORAL EVERY 6 HOURS PRN
Status: DISCONTINUED | OUTPATIENT
Start: 2022-03-13 | End: 2022-03-13

## 2022-03-13 RX ORDER — ONDANSETRON 2 MG/ML
4 INJECTION INTRAMUSCULAR; INTRAVENOUS AS NEEDED
Status: DISCONTINUED | OUTPATIENT
Start: 2022-03-13 | End: 2022-03-13 | Stop reason: HOSPADM

## 2022-03-13 RX ORDER — NALOXONE HYDROCHLORIDE 0.4 MG/ML
80 INJECTION, SOLUTION INTRAMUSCULAR; INTRAVENOUS; SUBCUTANEOUS AS NEEDED
Status: DISCONTINUED | OUTPATIENT
Start: 2022-03-13 | End: 2022-03-13 | Stop reason: HOSPADM

## 2022-03-13 RX ORDER — LEVOTHYROXINE SODIUM 0.05 MG/1
50 TABLET ORAL
Status: DISCONTINUED | OUTPATIENT
Start: 2022-03-13 | End: 2022-03-13

## 2022-03-13 RX ORDER — HYDROCODONE BITARTRATE AND ACETAMINOPHEN 5; 325 MG/1; MG/1
2 TABLET ORAL AS NEEDED
Status: DISCONTINUED | OUTPATIENT
Start: 2022-03-13 | End: 2022-03-13 | Stop reason: HOSPADM

## 2022-03-13 RX ORDER — MORPHINE SULFATE 2 MG/ML
2 INJECTION, SOLUTION INTRAMUSCULAR; INTRAVENOUS EVERY 2 HOUR PRN
Status: DISCONTINUED | OUTPATIENT
Start: 2022-03-13 | End: 2022-03-13

## 2022-03-13 RX ORDER — MIDAZOLAM HYDROCHLORIDE 1 MG/ML
INJECTION INTRAMUSCULAR; INTRAVENOUS AS NEEDED
Status: DISCONTINUED | OUTPATIENT
Start: 2022-03-13 | End: 2022-03-13 | Stop reason: SURG

## 2022-03-13 RX ADMIN — MIDAZOLAM HYDROCHLORIDE 2 MG: 1 INJECTION INTRAMUSCULAR; INTRAVENOUS at 11:20:00

## 2022-03-13 RX ADMIN — SODIUM CHLORIDE: 9 INJECTION, SOLUTION INTRAVENOUS at 11:57:00

## 2022-03-13 NOTE — PROGRESS NOTES
Pt received on cart, pt ambulated to bed, pt axox4, resting in bed, call light within reach, bed in low position.

## 2022-03-13 NOTE — ED QUICK NOTES
Pt straight cathed for urine per dr. martínez verbal order.  Alicia rn at bedside with this rn, pt tolerated well

## 2022-03-13 NOTE — PLAN OF CARE
Problem: Patient/Family Goals  Goal: Patient/Family Long Term Goal  Description: Patient's Long Term Goal: discharge    Interventions:  - surgery  - See additional Care Plan goals for specific interventions  Outcome: Progressing  Goal: Patient/Family Short Term Goal  Description: Patient's Short Term Goal: pain control    Interventions:   - medications  - See additional Care Plan goals for specific interventions  Outcome: Progressing     Problem: PAIN - ADULT  Goal: Verbalizes/displays adequate comfort level or patient's stated pain goal  Description: INTERVENTIONS:  - Encourage pt to monitor pain and request assistance  - Assess pain using appropriate pain scale  - Administer analgesics based on type and severity of pain and evaluate response  - Implement non-pharmacological measures as appropriate and evaluate response  - Consider cultural and social influences on pain and pain management  - Manage/alleviate anxiety  - Utilize distraction and/or relaxation techniques  - Monitor for opioid side effects  - Notify MD/LIP if interventions unsuccessful or patient reports new pain  - Anticipate increased pain with activity and pre-medicate as appropriate  Outcome: Progressing     Problem: SAFETY ADULT - FALL  Goal: Free from fall injury  Description: INTERVENTIONS:  - Assess pt frequently for physical needs  - Identify cognitive and physical deficits and behaviors that affect risk of falls.   - [place] fall precautions as indicated by assessment.  - Educate pt/family on patient safety including physical limitations  - Instruct pt to call for assistance with activity based on assessment  - Modify environment to reduce risk of injury  - Provide assistive devices as appropriate  - Consider OT/PT consult to assist with strengthening/mobility  - Encourage toileting schedule  Outcome: Progressing     Problem: METABOLIC/FLUID AND ELECTROLYTES - ADULT  Goal: Hemodynamic stability and optimal renal function maintained  Description: INTERVENTIONS:  - Monitor labs and assess for signs and symptoms of volume excess or deficit  - Monitor intake, output and patient weight  - Monitor urine specific gravity, serum osmolarity and serum sodium as indicated or ordered  - Monitor response to interventions for patient's volume status, including labs, urine output, blood pressure (other measures as available)  - Encourage oral intake as appropriate  - Instruct patient on fluid and nutrition restrictions as appropriate  Outcome: Progressing     Problem: METABOLIC/FLUID AND ELECTROLYTES - ADULT  Goal: Hemodynamic stability and optimal renal function maintained  Description: INTERVENTIONS:  - Monitor labs and assess for signs and symptoms of volume excess or deficit  - Monitor intake, output and patient weight  - Monitor urine specific gravity, serum osmolarity and serum sodium as indicated or ordered  - Monitor response to interventions for patient's volume status, including labs, urine output, blood pressure (other measures as available)  - Encourage oral intake as appropriate  - Instruct patient on fluid and nutrition restrictions as appropriate  Outcome: Progressing     Problem: HEMATOLOGIC - ADULT  Goal: Maintains hematologic stability  Description: INTERVENTIONS  - Assess for signs and symptoms of bleeding or hemorrhage  - Monitor labs and vital signs for trends  - Administer supportive blood products/factors, fluids and medications as ordered and appropriate  - Administer supportive blood products/factors as ordered and appropriate  Outcome: Progressing  Goal: Free from bleeding injury  Description: (Example usage: patient with low platelets)  INTERVENTIONS:  - Avoid intramuscular injections, enemas and rectal medication administration  - Ensure safe mobilization of patient  - Hold pressure on venipuncture sites to achieve adequate hemostasis  - Assess for signs and symptoms of internal bleeding  - Monitor lab trends  - Patient is to report abnormal signs of bleeding to staff  - Avoid use of toothpicks and dental floss  - Use electric shaver for shaving  - Use soft bristle tooth brush  - Limit straining and forceful nose blowing  Outcome: Progressing

## 2022-03-13 NOTE — PLAN OF CARE
Patient back from surgery. VSS. Denies any pain at present time. Peripad changed, moderate amount of drainage noted. POC discussed, all questions and concerns addressed. Possible discharge later this afternoon.

## 2022-03-13 NOTE — OPERATIVE REPORT
Pre-op Dx: PMB,   Post-op Dx: same  Surgeon: Joyce Sears  Procedure: Hysteroscopy D&C  Complications: none  Findings: enlarge uterus, sound about 15 cm  Procedure note: Pt was taken to the O.R. Where anesthesia was obtained without difficulty. She was then prepped and draped in normal sterile fashion in dorsal lithotomy position. Weighted speculum was placed in patients vagina and single tooth tenaculum applied to anterior lip of the cervix. Cervix  Appeared stenotic. Cervix was then gently dilated, uterus sounded to be 15 cm, blood clots passed, and hysteroscope introduced , unable to maintain seal to have clear view of uterine cavity due to distentiob of uterus and cervix with blood clots, . Sharp curettage was performed, intrauterine walls felt irregular, all instruments removed from patients vagina, good hemostasis noted.   EBL 50 cc

## 2022-03-13 NOTE — PLAN OF CARE
Patient resting in bed. VSS. Denies any lightheadedness/dizziness. Assisted patient to bathroom. Moderate amount of bleeding noted on pad, no clots. POC discussed with patient, all questions and concerns addressed. Will continue to monitor.

## 2022-03-13 NOTE — PLAN OF CARE
Assisted patient to bathroom. Denies any SOB/lightheadedness or dizziness. Angelica pad saturated. Dr. Fred Telles updated, o.k. to discharge. RN discussed with patient if any dizziness/SOB or lightheadedness occurs, return to emergency room. Patient verbalized understanding.

## 2022-03-13 NOTE — PROGRESS NOTES
Pt axox4, resting in bed, voiding in the toilet, abdomen soft round non-tender, hypoactive bowel sounds, lungs clear, has arthritis pain in knees no other pain reported, PIV infusing NS at 100ml/hr, pt care plan updated with patient will continue to monitor.

## 2022-03-13 NOTE — DISCHARGE SUMMARY
[facility] HOSPITALIST  DISCHARGE SUMMARY     Hao Oviedo Patient Status:  Inpatient    1945 MRN [id number]   [facility] 3NW-A Attending Mehnaz Horner MD   Hosp Day # 0 PCP Yoko Thorne MD     Date of Admission: 3/12/2022  Date of Discharge: 3/13/2022  Discharge Disposition: Home or Self Care    Discharge Diagnosis:   1. Vaginal bleeding, enlarged uterus with hemorrhagic clot, distended uterine cavity, hemorrhagic polyp vs neoplastic mass sp D&C 3/13/2022  2. Anemia  3. Essential hypertension  4. Abnormal UA  5. Dyslipidemia  6. Hypothyroidism    History of Present Illness: Hao Oviedo is a 76year old female with past medical history significant for previous CVA, dyslipidemia, hypothyroidism Zentz to ER with complaints of vaginal bleeding. Patient states her bleeding started 45 minutes prior to arrival.  She states that she soaked through 3 pads into diapers. She also complains of abdominal cramping. She denies lightheadedness, dizziness, chest pain, shortness of breath. Patient denies previous episodes. Brief Synopsis: Patient presented with vaginal bleeding. She was admitted with Gyne on consult. Patient underwent D&C. She was cleared for discharge home. Follow-up work-up per Gyne. Follow-up . Lace+ Score: 49  59-90 High Risk  29-58 Medium Risk  0-28   Low Risk         TCM Follow-Up Recommendation:  LACE 29-58: Moderate Risk of readmission after discharge from the hospital.  **Certification    Admission date was 3/12/2022. Inpatient stay was shorter than expected. Patient's Vaginal bleeding was initially serious enough to expect a more lengthy hospitalization but patient improved faster than expected. Discharge Medication List:     Discharge Medications      CONTINUE taking these medications      Instructions Prescription details   aspirin 325 MG Tabs      Take 325 mg by mouth daily.    Refills: 0     atorvastatin 40 MG Tabs  Commonly known as: LIPITOR TAKE 1 TABLET(40 MG) BY MOUTH EVERY NIGHT   Quantity: 90 tablet  Refills: 1     diclofenac 75 MG Tbec  Commonly known as: VOLTAREN      Take 1 tablet (75 mg total) by mouth 2 (two) times daily with meals. Quantity: 60 tablet  Refills: 0     levothyroxine 50 MCG Tabs  Commonly known as: SYNTHROID      TAKE 1 TABLET(50 MCG) BY MOUTH EVERY MORNING BEFORE BREAKFAST   Quantity: 90 tablet  Refills: 1     lisinopril 5 MG Tabs      TAKE 1 TABLET(5 MG) BY MOUTH DAILY   Quantity: 90 tablet  Refills: 1     metoprolol tartrate 25 MG Tabs  Commonly known as: LOPRESSOR      TAKE 1/2 TABLET(12.5 MG) BY MOUTH TWICE DAILY   Quantity: 90 tablet  Refills: 1     Naproxen Sodium 220 MG Caps      Take 440 mg by mouth as needed. Refills: 0     Premarin 0.625 MG/GM Crea  Generic drug: conjugated estrogens      Place 0.5 g vaginally daily. Refills: 0        STOP taking these medications    methylPREDNISolone 4 MG Tbpk  Commonly known as: Raysa SULTANA reviewed: FRANCHESKA    Follow-up appointment:   No follow-up provider specified.   Appointments for Next 30 Days 3/13/2022 - 4/12/2022            None        -----------------------------------------------------------------------------------------------  PATIENT DISCHARGE INSTRUCTIONS: See electronic chart    Rosalinda Levine MD    Time spent:  > 30 minutes

## 2022-03-14 NOTE — PLAN OF CARE
Patients IV d/c'd, catheter intact. All discharge instructions explained. Patient discharged via wheelchair with support staff.

## 2022-03-15 RX ORDER — NITROFURANTOIN 25; 75 MG/1; MG/1
100 CAPSULE ORAL 2 TIMES DAILY
Qty: 14 CAPSULE | Refills: 0 | Status: SHIPPED | OUTPATIENT
Start: 2022-03-15 | End: 2022-03-22

## 2022-03-18 ENCOUNTER — OFFICE VISIT (OUTPATIENT)
Dept: INTERNAL MEDICINE CLINIC | Facility: CLINIC | Age: 77
End: 2022-03-18
Payer: MEDICARE

## 2022-03-18 ENCOUNTER — TELEPHONE (OUTPATIENT)
Dept: INTERNAL MEDICINE CLINIC | Facility: CLINIC | Age: 77
End: 2022-03-18

## 2022-03-18 ENCOUNTER — TELEPHONE (OUTPATIENT)
Dept: OBGYN CLINIC | Facility: CLINIC | Age: 77
End: 2022-03-18

## 2022-03-18 VITALS
DIASTOLIC BLOOD PRESSURE: 78 MMHG | HEART RATE: 60 BPM | OXYGEN SATURATION: 99 % | BODY MASS INDEX: 34 KG/M2 | WEIGHT: 204.13 LBS | TEMPERATURE: 98 F | SYSTOLIC BLOOD PRESSURE: 122 MMHG | RESPIRATION RATE: 18 BRPM

## 2022-03-18 DIAGNOSIS — I10 ESSENTIAL HYPERTENSION: ICD-10-CM

## 2022-03-18 DIAGNOSIS — N85.02 COMPLEX ATYPICAL ENDOMETRIAL HYPERPLASIA: Primary | ICD-10-CM

## 2022-03-18 DIAGNOSIS — D62 ACUTE BLOOD LOSS ANEMIA: ICD-10-CM

## 2022-03-18 DIAGNOSIS — N95.0 PMB (POSTMENOPAUSAL BLEEDING): ICD-10-CM

## 2022-03-18 PROBLEM — N93.9 VAGINAL BLEEDING: Status: RESOLVED | Noted: 2022-03-13 | Resolved: 2022-03-18

## 2022-03-18 PROBLEM — R79.89 AZOTEMIA: Status: RESOLVED | Noted: 2022-03-13 | Resolved: 2022-03-18

## 2022-03-18 PROBLEM — R73.9 HYPERGLYCEMIA: Status: RESOLVED | Noted: 2022-03-13 | Resolved: 2022-03-18

## 2022-03-18 PROCEDURE — 3074F SYST BP LT 130 MM HG: CPT | Performed by: INTERNAL MEDICINE

## 2022-03-18 PROCEDURE — 99214 OFFICE O/P EST MOD 30 MIN: CPT | Performed by: INTERNAL MEDICINE

## 2022-03-18 PROCEDURE — 1111F DSCHRG MED/CURRENT MED MERGE: CPT | Performed by: INTERNAL MEDICINE

## 2022-03-18 PROCEDURE — 3078F DIAST BP <80 MM HG: CPT | Performed by: INTERNAL MEDICINE

## 2022-03-18 NOTE — TELEPHONE ENCOUNTER
Spoke to pt   Verified pt knows her appt w/Dr. Shawn Mills is 3/22/22  Pt was already aware and had no additional questions

## 2022-03-18 NOTE — TELEPHONE ENCOUNTER
Spoke to Wood Alexandra RN and explained Dr. Itzel Sanon wanted the first available appt for pt   First available is 3/22/22 at [facility] informed pt and scheduled her

## 2022-03-18 NOTE — TELEPHONE ENCOUNTER
LM for patient on her home number that her appointment to see Dr Latricia Llanes was moved to 3/22/22 at 10 am. Instructed patient to call the office if that time does not work for her.

## 2022-03-22 ENCOUNTER — OFFICE VISIT (OUTPATIENT)
Dept: OBGYN CLINIC | Facility: CLINIC | Age: 77
End: 2022-03-22
Payer: MEDICARE

## 2022-03-22 ENCOUNTER — TELEPHONE (OUTPATIENT)
Dept: OBGYN CLINIC | Facility: CLINIC | Age: 77
End: 2022-03-22

## 2022-03-22 VITALS
WEIGHT: 204 LBS | HEIGHT: 60 IN | SYSTOLIC BLOOD PRESSURE: 118 MMHG | DIASTOLIC BLOOD PRESSURE: 76 MMHG | HEART RATE: 70 BPM | BODY MASS INDEX: 40.05 KG/M2

## 2022-03-22 DIAGNOSIS — N95.0 PMB (POSTMENOPAUSAL BLEEDING): Primary | ICD-10-CM

## 2022-03-22 DIAGNOSIS — N85.02 COMPLEX ENDOMETRIAL HYPERPLASIA WITH ATYPIA: ICD-10-CM

## 2022-03-22 PROCEDURE — 3074F SYST BP LT 130 MM HG: CPT | Performed by: OBSTETRICS & GYNECOLOGY

## 2022-03-22 PROCEDURE — 3008F BODY MASS INDEX DOCD: CPT | Performed by: OBSTETRICS & GYNECOLOGY

## 2022-03-22 PROCEDURE — 3078F DIAST BP <80 MM HG: CPT | Performed by: OBSTETRICS & GYNECOLOGY

## 2022-03-22 PROCEDURE — 99213 OFFICE O/P EST LOW 20 MIN: CPT | Performed by: OBSTETRICS & GYNECOLOGY

## 2022-03-22 NOTE — TELEPHONE ENCOUNTER
Per Keyla perla/ [employer]. [address] is required. Clinicals was faxed to [phone]. Review could take up to 14 days. pending ref # [id number]. Call ref # [id number]. pending dates.

## 2022-03-22 NOTE — TELEPHONE ENCOUNTER
Spoke with patient. She is aware of her scheduled surgery for 3/28/22 at 0730 am. Instructions given and COVID ordered. Scheduled for her 4 week follow up on 4/29/22. Understanding verbalized.

## 2022-03-23 ENCOUNTER — EKG ENCOUNTER (OUTPATIENT)
Dept: LAB | Age: 77
End: 2022-03-23
Attending: OBSTETRICS & GYNECOLOGY
Payer: MEDICARE

## 2022-03-23 ENCOUNTER — TELEPHONE (OUTPATIENT)
Dept: OBGYN CLINIC | Facility: CLINIC | Age: 77
End: 2022-03-23

## 2022-03-23 DIAGNOSIS — Z20.822 ENCOUNTER FOR PREOPERATIVE SCREENING LABORATORY TESTING FOR COVID-19 VIRUS: ICD-10-CM

## 2022-03-23 DIAGNOSIS — Z01.812 ENCOUNTER FOR PREOPERATIVE SCREENING LABORATORY TESTING FOR COVID-19 VIRUS: ICD-10-CM

## 2022-03-23 PROCEDURE — 93010 ELECTROCARDIOGRAM REPORT: CPT | Performed by: INTERNAL MEDICINE

## 2022-03-23 PROCEDURE — 93005 ELECTROCARDIOGRAM TRACING: CPT

## 2022-03-23 NOTE — TELEPHONE ENCOUNTER
Per fax from Reshma Zhou w/ [address] [place] [place] [address]. [id number] has been approved. approval # [id number].

## 2022-03-24 LAB
ATRIAL RATE: 68 BPM
P AXIS: 73 DEGREES
P-R INTERVAL: 190 MS
Q-T INTERVAL: 382 MS
QRS DURATION: 94 MS
QTC CALCULATION (BEZET): 406 MS
R AXIS: -8 DEGREES
T AXIS: 25 DEGREES
VENTRICULAR RATE: 68 BPM

## 2022-03-25 ENCOUNTER — LAB ENCOUNTER (OUTPATIENT)
Dept: LAB | Age: 77
End: 2022-03-25
Attending: OBSTETRICS & GYNECOLOGY
Payer: MEDICARE

## 2022-03-25 DIAGNOSIS — Z01.812 ENCOUNTER FOR PREPROCEDURE SCREENING LABORATORY TESTING FOR COVID-19: ICD-10-CM

## 2022-03-25 DIAGNOSIS — Z20.822 ENCOUNTER FOR PREPROCEDURE SCREENING LABORATORY TESTING FOR COVID-19: ICD-10-CM

## 2022-03-25 LAB — SARS-COV-2 RNA RESP QL NAA+PROBE: NOT DETECTED

## 2022-03-27 ENCOUNTER — ANESTHESIA EVENT (OUTPATIENT)
Dept: SURGERY | Facility: HOSPITAL | Age: 77
DRG: 743 | End: 2022-03-27
Payer: MEDICARE

## 2022-03-28 ENCOUNTER — HOSPITAL ENCOUNTER (INPATIENT)
Facility: HOSPITAL | Age: 77
LOS: 2 days | Discharge: HOME | DRG: 743 | End: 2022-03-30
Attending: OBSTETRICS & GYNECOLOGY | Admitting: OBSTETRICS & GYNECOLOGY
Payer: MEDICARE

## 2022-03-28 ENCOUNTER — ANESTHESIA (OUTPATIENT)
Dept: SURGERY | Facility: HOSPITAL | Age: 77
DRG: 743 | End: 2022-03-28
Payer: MEDICARE

## 2022-03-28 DIAGNOSIS — N95.0 PMB (POSTMENOPAUSAL BLEEDING): ICD-10-CM

## 2022-03-28 DIAGNOSIS — Z01.812 ENCOUNTER FOR PREOPERATIVE SCREENING LABORATORY TESTING FOR COVID-19 VIRUS: Primary | ICD-10-CM

## 2022-03-28 DIAGNOSIS — N85.02 COMPLEX ATYPICAL ENDOMETRIAL HYPERPLASIA: ICD-10-CM

## 2022-03-28 DIAGNOSIS — Z20.822 ENCOUNTER FOR PREOPERATIVE SCREENING LABORATORY TESTING FOR COVID-19 VIRUS: Primary | ICD-10-CM

## 2022-03-28 PROBLEM — Z90.79 STATUS POST TOTAL ABDOMINAL HYSTERECTOMY AND BILATERAL SALPINGO-OOPHORECTOMY (TAH-BSO): Status: ACTIVE | Noted: 2022-03-28

## 2022-03-28 PROBLEM — Z90.722 STATUS POST TOTAL ABDOMINAL HYSTERECTOMY AND BILATERAL SALPINGO-OOPHORECTOMY (TAH-BSO): Status: ACTIVE | Noted: 2022-03-28

## 2022-03-28 PROBLEM — Z90.710 STATUS POST TOTAL ABDOMINAL HYSTERECTOMY AND BILATERAL SALPINGO-OOPHORECTOMY (TAH-BSO): Status: ACTIVE | Noted: 2022-03-28

## 2022-03-28 PROCEDURE — 76942 ECHO GUIDE FOR BIOPSY: CPT | Performed by: ANESTHESIOLOGY

## 2022-03-28 PROCEDURE — 3E0T3BZ INTRODUCTION OF ANESTHETIC AGENT INTO PERIPHERAL NERVES AND PLEXI, PERCUTANEOUS APPROACH: ICD-10-PCS | Performed by: ANESTHESIOLOGY

## 2022-03-28 PROCEDURE — 0UT70ZZ RESECTION OF BILATERAL FALLOPIAN TUBES, OPEN APPROACH: ICD-10-PCS | Performed by: OBSTETRICS & GYNECOLOGY

## 2022-03-28 PROCEDURE — 88309 TISSUE EXAM BY PATHOLOGIST: CPT | Performed by: OBSTETRICS & GYNECOLOGY

## 2022-03-28 PROCEDURE — 0UT90ZZ RESECTION OF UTERUS, OPEN APPROACH: ICD-10-PCS | Performed by: OBSTETRICS & GYNECOLOGY

## 2022-03-28 PROCEDURE — 0UT20ZZ RESECTION OF BILATERAL OVARIES, OPEN APPROACH: ICD-10-PCS | Performed by: OBSTETRICS & GYNECOLOGY

## 2022-03-28 RX ORDER — NALOXONE HYDROCHLORIDE 0.4 MG/ML
80 INJECTION, SOLUTION INTRAMUSCULAR; INTRAVENOUS; SUBCUTANEOUS AS NEEDED
Status: DISCONTINUED | OUTPATIENT
Start: 2022-03-28 | End: 2022-03-28 | Stop reason: HOSPADM

## 2022-03-28 RX ORDER — HYDROMORPHONE HYDROCHLORIDE 1 MG/ML
INJECTION, SOLUTION INTRAMUSCULAR; INTRAVENOUS; SUBCUTANEOUS
Status: COMPLETED
Start: 2022-03-28 | End: 2022-03-28

## 2022-03-28 RX ORDER — CEFAZOLIN SODIUM/WATER 2 G/20 ML
2 SYRINGE (ML) INTRAVENOUS ONCE
Status: DISCONTINUED | OUTPATIENT
Start: 2022-03-28 | End: 2022-03-28 | Stop reason: HOSPADM

## 2022-03-28 RX ORDER — ACETAMINOPHEN 500 MG
1000 TABLET ORAL ONCE
Status: DISCONTINUED | OUTPATIENT
Start: 2022-03-28 | End: 2022-03-28 | Stop reason: HOSPADM

## 2022-03-28 RX ORDER — MEPERIDINE HYDROCHLORIDE 25 MG/ML
12.5 INJECTION INTRAMUSCULAR; INTRAVENOUS; SUBCUTANEOUS AS NEEDED
Status: DISCONTINUED | OUTPATIENT
Start: 2022-03-28 | End: 2022-03-28 | Stop reason: HOSPADM

## 2022-03-28 RX ORDER — LABETALOL HYDROCHLORIDE 5 MG/ML
10 INJECTION, SOLUTION INTRAVENOUS EVERY 10 MIN PRN
Status: DISCONTINUED | OUTPATIENT
Start: 2022-03-28 | End: 2022-03-28 | Stop reason: HOSPADM

## 2022-03-28 RX ORDER — ONDANSETRON 2 MG/ML
INJECTION INTRAMUSCULAR; INTRAVENOUS
Status: COMPLETED
Start: 2022-03-28 | End: 2022-03-28

## 2022-03-28 RX ORDER — ROCURONIUM BROMIDE 10 MG/ML
INJECTION, SOLUTION INTRAVENOUS AS NEEDED
Status: DISCONTINUED | OUTPATIENT
Start: 2022-03-28 | End: 2022-03-28 | Stop reason: SURG

## 2022-03-28 RX ORDER — DEXAMETHASONE SODIUM PHOSPHATE 4 MG/ML
VIAL (ML) INJECTION AS NEEDED
Status: DISCONTINUED | OUTPATIENT
Start: 2022-03-28 | End: 2022-03-28 | Stop reason: SURG

## 2022-03-28 RX ORDER — HYDROMORPHONE HYDROCHLORIDE 1 MG/ML
0.4 INJECTION, SOLUTION INTRAMUSCULAR; INTRAVENOUS; SUBCUTANEOUS EVERY 5 MIN PRN
Status: DISCONTINUED | OUTPATIENT
Start: 2022-03-28 | End: 2022-03-28 | Stop reason: HOSPADM

## 2022-03-28 RX ORDER — LISINOPRIL 5 MG/1
5 TABLET ORAL DAILY
Status: DISCONTINUED | OUTPATIENT
Start: 2022-03-28 | End: 2022-03-30

## 2022-03-28 RX ORDER — DIPHENHYDRAMINE HYDROCHLORIDE 50 MG/ML
12.5 INJECTION INTRAMUSCULAR; INTRAVENOUS EVERY 4 HOURS PRN
Status: DISCONTINUED | OUTPATIENT
Start: 2022-03-28 | End: 2022-03-30

## 2022-03-28 RX ORDER — MIDAZOLAM HYDROCHLORIDE 1 MG/ML
1 INJECTION INTRAMUSCULAR; INTRAVENOUS EVERY 5 MIN PRN
Status: DISCONTINUED | OUTPATIENT
Start: 2022-03-28 | End: 2022-03-28 | Stop reason: HOSPADM

## 2022-03-28 RX ORDER — SODIUM CHLORIDE, SODIUM LACTATE, POTASSIUM CHLORIDE, CALCIUM CHLORIDE 600; 310; 30; 20 MG/100ML; MG/100ML; MG/100ML; MG/100ML
INJECTION, SOLUTION INTRAVENOUS CONTINUOUS
Status: DISCONTINUED | OUTPATIENT
Start: 2022-03-28 | End: 2022-03-28 | Stop reason: HOSPADM

## 2022-03-28 RX ORDER — ONDANSETRON 2 MG/ML
INJECTION INTRAMUSCULAR; INTRAVENOUS AS NEEDED
Status: DISCONTINUED | OUTPATIENT
Start: 2022-03-28 | End: 2022-03-28 | Stop reason: SURG

## 2022-03-28 RX ORDER — ONDANSETRON 2 MG/ML
4 INJECTION INTRAMUSCULAR; INTRAVENOUS EVERY 6 HOURS PRN
Status: DISCONTINUED | OUTPATIENT
Start: 2022-03-28 | End: 2022-03-30

## 2022-03-28 RX ORDER — ONDANSETRON 2 MG/ML
4 INJECTION INTRAMUSCULAR; INTRAVENOUS AS NEEDED
Status: DISCONTINUED | OUTPATIENT
Start: 2022-03-28 | End: 2022-03-28 | Stop reason: HOSPADM

## 2022-03-28 RX ORDER — ENOXAPARIN SODIUM 100 MG/ML
40 INJECTION SUBCUTANEOUS NIGHTLY
Status: DISCONTINUED | OUTPATIENT
Start: 2022-03-28 | End: 2022-03-30

## 2022-03-28 RX ORDER — METOCLOPRAMIDE HYDROCHLORIDE 5 MG/ML
10 INJECTION INTRAMUSCULAR; INTRAVENOUS AS NEEDED
Status: DISCONTINUED | OUTPATIENT
Start: 2022-03-28 | End: 2022-03-28 | Stop reason: HOSPADM

## 2022-03-28 RX ORDER — HEPARIN SODIUM 5000 [USP'U]/ML
5000 INJECTION, SOLUTION INTRAVENOUS; SUBCUTANEOUS ONCE
Status: COMPLETED | OUTPATIENT
Start: 2022-03-28 | End: 2022-03-28

## 2022-03-28 RX ORDER — NALOXONE HYDROCHLORIDE 0.4 MG/ML
0.08 INJECTION, SOLUTION INTRAMUSCULAR; INTRAVENOUS; SUBCUTANEOUS
Status: DISCONTINUED | OUTPATIENT
Start: 2022-03-28 | End: 2022-03-30

## 2022-03-28 RX ORDER — MIDAZOLAM HYDROCHLORIDE 1 MG/ML
INJECTION INTRAMUSCULAR; INTRAVENOUS AS NEEDED
Status: DISCONTINUED | OUTPATIENT
Start: 2022-03-28 | End: 2022-03-28 | Stop reason: SURG

## 2022-03-28 RX ORDER — ATORVASTATIN CALCIUM 40 MG/1
40 TABLET, FILM COATED ORAL NIGHTLY
COMMUNITY

## 2022-03-28 RX ORDER — CEFAZOLIN SODIUM/WATER 2 G/20 ML
2 SYRINGE (ML) INTRAVENOUS EVERY 8 HOURS
Status: COMPLETED | OUTPATIENT
Start: 2022-03-28 | End: 2022-03-28

## 2022-03-28 RX ORDER — ATORVASTATIN CALCIUM 40 MG/1
40 TABLET, FILM COATED ORAL NIGHTLY
Status: DISCONTINUED | OUTPATIENT
Start: 2022-03-28 | End: 2022-03-30

## 2022-03-28 RX ORDER — LABETALOL HYDROCHLORIDE 5 MG/ML
INJECTION, SOLUTION INTRAVENOUS AS NEEDED
Status: DISCONTINUED | OUTPATIENT
Start: 2022-03-28 | End: 2022-03-28 | Stop reason: SURG

## 2022-03-28 RX ORDER — SODIUM CHLORIDE, SODIUM LACTATE, POTASSIUM CHLORIDE, CALCIUM CHLORIDE 600; 310; 30; 20 MG/100ML; MG/100ML; MG/100ML; MG/100ML
INJECTION, SOLUTION INTRAVENOUS CONTINUOUS
Status: DISCONTINUED | OUTPATIENT
Start: 2022-03-28 | End: 2022-03-28

## 2022-03-28 RX ORDER — LISINOPRIL 5 MG/1
5 TABLET ORAL DAILY
COMMUNITY
End: 2022-04-01

## 2022-03-28 RX ORDER — DIPHENHYDRAMINE HYDROCHLORIDE 50 MG/ML
12.5 INJECTION INTRAMUSCULAR; INTRAVENOUS AS NEEDED
Status: DISCONTINUED | OUTPATIENT
Start: 2022-03-28 | End: 2022-03-28 | Stop reason: HOSPADM

## 2022-03-28 RX ORDER — DEXTROSE AND SODIUM CHLORIDE 5; .9 G/100ML; G/100ML
INJECTION, SOLUTION INTRAVENOUS CONTINUOUS
Status: DISCONTINUED | OUTPATIENT
Start: 2022-03-28 | End: 2022-03-30

## 2022-03-28 RX ORDER — LEVOTHYROXINE SODIUM 0.05 MG/1
50 TABLET ORAL
Status: DISCONTINUED | OUTPATIENT
Start: 2022-03-28 | End: 2022-03-30

## 2022-03-28 RX ORDER — LEVOTHYROXINE SODIUM 0.05 MG/1
50 TABLET ORAL
COMMUNITY
End: 2022-04-01

## 2022-03-28 RX ORDER — SODIUM CHLORIDE 9 MG/ML
INJECTION, SOLUTION INTRAVENOUS CONTINUOUS
Status: DISCONTINUED | OUTPATIENT
Start: 2022-03-28 | End: 2022-03-30

## 2022-03-28 RX ADMIN — SODIUM CHLORIDE, SODIUM LACTATE, POTASSIUM CHLORIDE, CALCIUM CHLORIDE: 600; 310; 30; 20 INJECTION, SOLUTION INTRAVENOUS at 10:09:00

## 2022-03-28 RX ADMIN — DEXAMETHASONE SODIUM PHOSPHATE 8 MG: 4 MG/ML VIAL (ML) INJECTION at 07:56:00

## 2022-03-28 RX ADMIN — MIDAZOLAM HYDROCHLORIDE 2 MG: 1 INJECTION INTRAMUSCULAR; INTRAVENOUS at 07:36:00

## 2022-03-28 RX ADMIN — SODIUM CHLORIDE, SODIUM LACTATE, POTASSIUM CHLORIDE, CALCIUM CHLORIDE: 600; 310; 30; 20 INJECTION, SOLUTION INTRAVENOUS at 07:34:00

## 2022-03-28 RX ADMIN — ONDANSETRON 4 MG: 2 INJECTION INTRAMUSCULAR; INTRAVENOUS at 07:56:00

## 2022-03-28 RX ADMIN — LABETALOL HYDROCHLORIDE 10 MG: 5 INJECTION, SOLUTION INTRAVENOUS at 08:17:00

## 2022-03-28 RX ADMIN — ROCURONIUM BROMIDE 60 MG: 10 INJECTION, SOLUTION INTRAVENOUS at 07:41:00

## 2022-03-28 NOTE — ANESTHESIA POSTPROCEDURE EVALUATION
[address] [facility] Patient Status:  Inpatient   Age/Gender 76year old female MRN [id number]   [facility] SURGERY Attending Vicky Vázquez, [address] Day [address] 0 PCP Radha Herrmann MD       Anesthesia Post-op Note    Total Abdominal Hysterectomy, Bilateral Salpingo-oophorectomy    Procedure Summary     Date: 03/28/22 Room / Location: [facility] MAIN OR  / [facility] MAIN OR    Anesthesia Start: 0352 Anesthesia Stop:     Procedure: Total Abdominal Hysterectomy, Bilateral Salpingo-oophorectomy (Bilateral Abdomen) Diagnosis:       Complex atypical endometrial hyperplasia      PMB (postmenopausal bleeding)      (Complex atypical endometrial hyperplasia [N85.02]PMB (postmenopausal bleeding) [N95.0])    Surgeons: Vicky Vázquez DO Anesthesiologist: Devin Blake MD    Anesthesia Type: general ASA Status: 3          Anesthesia Type: general    Vitals Value Taken Time   /93 03/28/22 1010   Temp 98.5 03/28/22 1010   Pulse 76 03/28/22 1010   Resp 12 03/28/22 1010   SpO2 98 03/28/22 1010       Patient Location: PACU    Anesthesia Type: general    Airway Patency: patent and extubated    Postop Pain Control: inadequate, being treated    Mental Status: mildly sedated but able to meaningfully participate in the post-anesthesia evaluation    Nausea/Vomiting: none    Cardiopulmonary/Hydration status: stable euvolemic    Complications: no apparent anesthesia related complications    Dental Exam: Unchanged from Preop    Patient to be discharged from PACU when criteria met.

## 2022-03-28 NOTE — ANESTHESIA PROCEDURE NOTES
Airway  Date/Time: 3/28/2022 7:47 AM  Urgency: elective      General Information and Staff    Patient location during procedure: OR  Anesthesiologist: Darvin Moore MD  Performed: anesthesiologist     Indications and Patient Condition  Indications for airway management: anesthesia  Spontaneous ventilation: present  Sedation level: minimal  Preoxygenated: yes  Patient position: sniffing  Mask difficulty assessment: 1 - vent by mask    Final Airway Details  Final airway type: endotracheal airway      Successful airway: ETT  Cuffed: yes   Successful intubation technique: Video laryngoscopy  Facilitating devices/methods: intubating stylet  Endotracheal tube insertion site: oral  Blade: GlideScope  Blade size: #3  ETT size (mm): 7.5    Cormack-Lehane Classification: grade IIA - partial view of glottis  Placement verified by: chest auscultation and capnometry   Measured from: lips  ETT to lips (cm): 21  Number of attempts at approach: 1  Number of other approaches attempted: 0

## 2022-03-28 NOTE — BRIEF OP NOTE
Pre-Operative Diagnosis: Complex atypical endometrial hyperplasia [N85.02]  PMB (postmenopausal bleeding) [N95.0]     Post-Operative Diagnosis: Complex atypical endometrial hyperplasia [N85.02]PMB (postmenopausal bleeding) [N95.0]      Procedure Performed:    Total Abdominal Hysterectomy, Bilateral Salpingo-oophorectomy    Surgeon(s) and Role:     * Armin Edmonds DO - Primary     * Hung Cope MD - Assisting Surgeon    Assistant(s):        Surgical Findings: normal anatomy     Specimen: uterus, cervix, b/l tubes and ovaries     Estimated Blood Loss: Blood Output: 150 mL (3/28/2022  9:24 AM)      Dictation Number:      Erleen RossanabDO  3/28/2022  9:45 AM

## 2022-03-28 NOTE — OPERATIVE REPORT
[facility] (formerly [facility])    PATIENT'S NAME: Portillo Gilbert   ATTENDING PHYSICIAN: Solis Angel D.O.   OPERATING PHYSICIAN: Solis Angel D.O.   PATIENT ACCOUNT#:   [id number]    LOCATION:  [address] [place] [address]  MEDICAL RECORD #:   [id number]       YOB: 1945  ADMISSION DATE:       03/28/2022      OPERATION DATE:  03/28/2022    OPERATIVE REPORT    PREOPERATIVE DIAGNOSIS:  Postmenopausal bleeding, endometrial hyperplasia with atypia. POSTOPERATIVE DIAGNOSIS:  Postmenopausal bleeding, endometrial hyperplasia with atypia. PROCEDURE:  Total abdominal hysterectomy, bilateral salpingo-oophorectomy. ASSISTANT SURGEON:  Zachariah Stone. Sony Christensen MD     OPERATIVE TECHNIQUE:  Patient was taken to the operating room where general anesthesia was obtained without difficulty. She was then prepared and draped in normal sterile fashion in dorsal supine position. A Pfannenstiel skin incision was then made with a scalpel and carried through to the underlying layer of fascia. Fascia incised in midline and incision extended laterally with the Cordero scissors. Rectus muscles were then  in the midline and  from fascia by blunt and sharp dissection. The peritoneum identified and entered. Incision extended superiorly and inferiorly with good visualization of the bladder. O'Mike-O'Esparza retractor placed intra-abdominally, and the bowel packed away with moist laparotomy sponges. Single-tooth tenaculum placed to the uterine fundus to provide the means to manipulate the uterus. Bilateral round ligaments were doubly clamped, transected, and suture ligated with 0 Vicryl. Good hemostasis was noted. Bladder flap created using Metzenbaum scissors, and the bladder dissected off the lower uterine segment with blunt and sharp dissection. The infundibulopelvic ligament was then doubly clamped, transected, and suture ligated bilaterally with good hemostasis.   Uterine arteries clamped, transected, and suture ligated with excellent hemostasis. The bilateral uterosacral and cardinal ligaments were then doubly clamped, transected, and suture ligated, and the uterus then was amputated. The vaginal cuff was then closed with 0 Vicryl in a running fashion. Good hemostasis assured. The pelvis was then irrigated copiously, and Surgicel placed on the vaginal cuff and bilateral infundibulopelvic ligaments with good hemostasis noted. All laps were then removed from patient's abdomen, and the O'Mike-O'Esparza retractor removed too. The peritoneum was then closed with a 2-0 Vicryl in a running fashion and the fascia closed with a 1-0 Vicryl in a running fashion with good hemostasis. Subcuticular tissue was closed with plain gut, and the skin closed with a 4-0 Vicryl. Good hemostasis assured. The patient was taken to the recovery room in stable condition. Estimated blood loss 150 mL.      Dictated By Luis Felipe Gage D.O.  d: 03/28/2022 09:48:29  t: 03/28/2022 18:29:51  [facility] [id number]  /

## 2022-03-28 NOTE — PLAN OF CARE
Abd soft and tender. Hypo bs. Incision cdi. Angelica pad with scant ss drainage. Middleton withclear yellow urine. Pt using PCA for pain control. Poc updated, pt verbalized understanding. Problem: PAIN - ADULT  Goal: Verbalizes/displays adequate comfort level or patient's stated pain goal  Description: INTERVENTIONS:  - Encourage pt to monitor pain and request assistance  - Assess pain using appropriate pain scale  - Administer analgesics based on type and severity of pain and evaluate response  - Implement non-pharmacological measures as appropriate and evaluate response  - Consider cultural and social influences on pain and pain management  - Manage/alleviate anxiety  - Utilize distraction and/or relaxation techniques  - Monitor for opioid side effects  - Notify MD/LIP if interventions unsuccessful or patient reports new pain  - Anticipate increased pain with activity and pre-medicate as appropriate  Outcome: Progressing     Problem: RISK FOR INFECTION - ADULT  Goal: Absence of fever/infection during anticipated neutropenic period  Description: INTERVENTIONS  - Monitor WBC  - Administer growth factors as ordered  - Implement neutropenic guidelines  Outcome: Progressing     Problem: SAFETY ADULT - FALL  Goal: Free from fall injury  Description: INTERVENTIONS:  - Assess pt frequently for physical needs  - Identify cognitive and physical deficits and behaviors that affect risk of falls.   - [place] fall precautions as indicated by assessment.  - Educate pt/family on patient safety including physical limitations  - Instruct pt to call for assistance with activity based on assessment  - Modify environment to reduce risk of injury  - Provide assistive devices as appropriate  - Consider OT/PT consult to assist with strengthening/mobility  - Encourage toileting schedule  Outcome: Progressing     Problem: DISCHARGE PLANNING  Goal: Discharge to home or other facility with appropriate resources  Description: INTERVENTIONS:  - Identify barriers to discharge w/pt and caregiver  - Include patient/family/discharge partner in discharge planning  - Arrange for needed discharge resources and transportation as appropriate  - Identify discharge learning needs (meds, wound care, etc)  - Arrange for interpreters to assist at discharge as needed  - Consider post-discharge preferences of patient/family/discharge partner  - Complete POLST form as appropriate  - Assess patient's ability to be responsible for managing their own health  - Refer to Case Management Department for coordinating discharge planning if the patient needs post-hospital services based on physician/LIP order or complex needs related to functional status, cognitive ability or social support system  Outcome: Progressing

## 2022-03-28 NOTE — ANESTHESIA PROCEDURE NOTES
Regional Block  Performed by: Norma Hutchins MD  Authorized by: Norma Hutchins MD       General Information and Staff    Start Time:  3/28/2022 9:37 AM  End Time:  3/28/2022 9:47 AM  Anesthesiologist:  Norma Hutchins MD  Performed by: Anesthesiologist  Patient Location:  OR    Block Placement: Post Induction  Site Identification: real time ultrasound guided and image stored and retrievable    Block site/laterality marked before start: site marked  Reason for Block: at surgeon's request and post-op pain management    Preanesthetic Checklist: 2 patient identifers, IV checked, risks and benefits discussed, monitors and equipment checked, pre-op evaluation, timeout performed, anesthesia consent, sterile technique used, no prohibitive neurological deficits and no local skin infection at insertion site      Procedure Details    Patient Position:  Supine  Prep: ChloraPrep    Monitoring:  Cardiac monitor, continuous pulse ox, blood pressure cuff and heart rate  Block Type:  TAP  Laterality:  Bilateral  Injection Technique:  Single-shot    Needle    Needle Type:  Short-bevel and echogenic  Needle Gauge:  21 G  Needle Length:  110 mm  Needle Localization:  Ultrasound guidance  Reason for Ultrasound Use: appropriate spread of the medication was noted in real time and no ultrasound evidence of intravascular and/or intraneural injection            Assessment    Injection Assessment:  Good spread noted, negative resistance, negative aspiration for heme, incremental injection and low pressure  Heart Rate Change: No    - Patient tolerated block procedure well without evidence of immediate block related complications.      Medications      Additional Comments    Medication:  Ropivacaine 0.25% 30mL + dexamethasone 2 mg  -  30 ml each side

## 2022-03-29 ENCOUNTER — TELEPHONE (OUTPATIENT)
Dept: INTERNAL MEDICINE CLINIC | Facility: CLINIC | Age: 77
End: 2022-03-29

## 2022-03-29 ENCOUNTER — TELEPHONE (OUTPATIENT)
Dept: OBGYN CLINIC | Facility: CLINIC | Age: 77
End: 2022-03-29

## 2022-03-29 LAB
ALBUMIN SERPL-MCNC: 2.5 G/DL (ref 3.4–5)
ALBUMIN/GLOB SERPL: 0.9 {RATIO} (ref 1–2)
ALP LIVER SERPL-CCNC: 71 U/L
ALT SERPL-CCNC: 19 U/L
ANION GAP SERPL CALC-SCNC: 5 MMOL/L (ref 0–18)
AST SERPL-CCNC: 15 U/L (ref 15–37)
BASOPHILS # BLD AUTO: 0.01 X10(3) UL (ref 0–0.2)
BASOPHILS NFR BLD AUTO: 0.1 %
BILIRUB SERPL-MCNC: 0.3 MG/DL (ref 0.1–2)
BUN BLD-MCNC: 17 MG/DL (ref 7–18)
CALCIUM BLD-MCNC: 8 MG/DL (ref 8.5–10.1)
CHLORIDE SERPL-SCNC: 113 MMOL/L (ref 98–112)
CO2 SERPL-SCNC: 26 MMOL/L (ref 21–32)
CREAT BLD-MCNC: 0.84 MG/DL
EOSINOPHIL # BLD AUTO: 0 X10(3) UL (ref 0–0.7)
EOSINOPHIL NFR BLD AUTO: 0 %
ERYTHROCYTE [DISTWIDTH] IN BLOOD BY AUTOMATED COUNT: 13.1 %
GLOBULIN PLAS-MCNC: 2.9 G/DL (ref 2.8–4.4)
GLUCOSE BLD-MCNC: 150 MG/DL (ref 70–99)
HCT VFR BLD AUTO: 32.5 %
HGB BLD-MCNC: 10.3 G/DL
IMM GRANULOCYTES # BLD AUTO: 0.03 X10(3) UL (ref 0–1)
IMM GRANULOCYTES NFR BLD: 0.2 %
LYMPHOCYTES # BLD AUTO: 1.89 X10(3) UL (ref 1–4)
LYMPHOCYTES NFR BLD AUTO: 14.5 %
MCH RBC QN AUTO: 29.9 PG (ref 26–34)
MCHC RBC AUTO-ENTMCNC: 31.7 G/DL (ref 31–37)
MCV RBC AUTO: 94.2 FL
MONOCYTES # BLD AUTO: 1.88 X10(3) UL (ref 0.1–1)
MONOCYTES NFR BLD AUTO: 14.4 %
NEUTROPHILS # BLD AUTO: 9.23 X10 (3) UL (ref 1.5–7.7)
NEUTROPHILS # BLD AUTO: 9.23 X10(3) UL (ref 1.5–7.7)
NEUTROPHILS NFR BLD AUTO: 70.8 %
OSMOLALITY SERPL CALC.SUM OF ELEC: 302 MOSM/KG (ref 275–295)
PLATELET # BLD AUTO: 257 10(3)UL (ref 150–450)
POTASSIUM SERPL-SCNC: 4.3 MMOL/L (ref 3.5–5.1)
PROT SERPL-MCNC: 5.4 G/DL (ref 6.4–8.2)
RBC # BLD AUTO: 3.45 X10(6)UL
SODIUM SERPL-SCNC: 144 MMOL/L (ref 136–145)
WBC # BLD AUTO: 13 X10(3) UL (ref 4–11)

## 2022-03-29 PROCEDURE — 80053 COMPREHEN METABOLIC PANEL: CPT | Performed by: OBSTETRICS & GYNECOLOGY

## 2022-03-29 PROCEDURE — 85025 COMPLETE CBC W/AUTO DIFF WBC: CPT | Performed by: OBSTETRICS & GYNECOLOGY

## 2022-03-29 RX ORDER — KETOROLAC TROMETHAMINE 30 MG/ML
30 INJECTION, SOLUTION INTRAMUSCULAR; INTRAVENOUS EVERY 6 HOURS PRN
Status: DISCONTINUED | OUTPATIENT
Start: 2022-03-29 | End: 2022-03-29

## 2022-03-29 RX ORDER — KETOROLAC TROMETHAMINE 15 MG/ML
15 INJECTION, SOLUTION INTRAMUSCULAR; INTRAVENOUS EVERY 6 HOURS PRN
Status: DISCONTINUED | OUTPATIENT
Start: 2022-03-29 | End: 2022-03-30

## 2022-03-29 RX ORDER — ACETAMINOPHEN 500 MG
1000 TABLET ORAL EVERY 6 HOURS
Status: DISCONTINUED | OUTPATIENT
Start: 2022-03-29 | End: 2022-03-30

## 2022-03-29 RX ORDER — GABAPENTIN 300 MG/1
300 CAPSULE ORAL EVERY 8 HOURS PRN
Status: DISCONTINUED | OUTPATIENT
Start: 2022-03-29 | End: 2022-03-30

## 2022-03-29 RX ORDER — OXYCODONE HYDROCHLORIDE 5 MG/1
5 TABLET ORAL EVERY 4 HOURS PRN
Status: DISCONTINUED | OUTPATIENT
Start: 2022-03-29 | End: 2022-03-30

## 2022-03-29 NOTE — TELEPHONE ENCOUNTER
Received approval notification from Lynette Ohara [facility] regarding abdominal hysterectomy   [facility] [place]

## 2022-03-29 NOTE — PLAN OF CARE
Patient had 1 emesis after eating breakfast. Feels better now, did ambulate without difficulty. Middleton catheter discontinued. POC discussed, all questions and concerns addressed.

## 2022-03-29 NOTE — CM/SW NOTE
03/29/22 1200   / Referral Data   Referral Source    Reason for Referral Discharge planning   Informant Patient   Pertinent Medical Hx   Does patient have an established PCP? Yes   Patient Status Prior to Admission   Independent with ADLs and Mobility Yes   Discharge Needs   Anticipated D/C needs No anticipated discharge needs     Pt is from home alone. Pt is independent with ADLs, driving. Pt denies dme. Pt is active in the community with volunteering and cleans neighbors home. No needs identified at this time. RN to contact CM if needs arise.   Damián Becerra, MSN RN, [address]  X [address]

## 2022-03-29 NOTE — PLAN OF CARE
Patient resting in bed. VSS. PCA in place. Denies pain at present time. Nausea is better, able to have sips of water without nausea. Peripad changed, incisional area intact. ABD binder in place. POC discussed, all questions and concerns addressed. All safety measures in place. Will continue to monitor. Problem: PAIN - ADULT  Goal: Verbalizes/displays adequate comfort level or patient's stated pain goal  Description: INTERVENTIONS:  - Encourage pt to monitor pain and request assistance  - Assess pain using appropriate pain scale  - Administer analgesics based on type and severity of pain and evaluate response  - Implement non-pharmacological measures as appropriate and evaluate response  - Consider cultural and social influences on pain and pain management  - Manage/alleviate anxiety  - Utilize distraction and/or relaxation techniques  - Monitor for opioid side effects  - Notify MD/LIP if interventions unsuccessful or patient reports new pain  - Anticipate increased pain with activity and pre-medicate as appropriate  Outcome: Progressing     Problem: RISK FOR INFECTION - ADULT  Goal: Absence of fever/infection during anticipated neutropenic period  Description: INTERVENTIONS  - Monitor WBC  - Administer growth factors as ordered  - Implement neutropenic guidelines  Outcome: Progressing     Problem: RISK FOR INFECTION - ADULT  Goal: Absence of fever/infection during anticipated neutropenic period  Description: INTERVENTIONS  - Monitor WBC  - Administer growth factors as ordered  - Implement neutropenic guidelines  Outcome: Progressing     Problem: SAFETY ADULT - FALL  Goal: Free from fall injury  Description: INTERVENTIONS:  - Assess pt frequently for physical needs  - Identify cognitive and physical deficits and behaviors that affect risk of falls.   - [place] fall precautions as indicated by assessment.  - Educate pt/family on patient safety including physical limitations  - Instruct pt to call for assistance with activity based on assessment  - Modify environment to reduce risk of injury  - Provide assistive devices as appropriate  - Consider OT/PT consult to assist with strengthening/mobility  - Encourage toileting schedule  Outcome: Progressing     Problem: DISCHARGE PLANNING  Goal: Discharge to home or other facility with appropriate resources  Description: INTERVENTIONS:  - Identify barriers to discharge w/pt and caregiver  - Include patient/family/discharge partner in discharge planning  - Arrange for needed discharge resources and transportation as appropriate  - Identify discharge learning needs (meds, wound care, etc)  - Arrange for interpreters to assist at discharge as needed  - Consider post-discharge preferences of patient/family/discharge partner  - Complete POLST form as appropriate  - Assess patient's ability to be responsible for managing their own health  - Refer to Case Management Department for coordinating discharge planning if the patient needs post-hospital services based on physician/LIP order or complex needs related to functional status, cognitive ability or social support system  Outcome: Progressing

## 2022-03-29 NOTE — TELEPHONE ENCOUNTER
Service procedure code/description: inpatient hospital     Begin date 3/28/2022- 3/30/2022   2 days approved    Service procedure code/description: 51981    Begin date 3/28/2022 - 3/30/2022   2 days approved    Request ID: [id number]

## 2022-03-29 NOTE — TELEPHONE ENCOUNTER
Incoming (mail or fax):  fax  Received from:  [address] [place] [address]  Documentation given to:  Triage    Approved procedure based on medical necessity

## 2022-03-30 ENCOUNTER — MED REC SCAN ONLY (OUTPATIENT)
Dept: INTERNAL MEDICINE CLINIC | Facility: CLINIC | Age: 77
End: 2022-03-30

## 2022-03-30 VITALS
HEIGHT: 65 IN | WEIGHT: 203.94 LBS | HEART RATE: 59 BPM | DIASTOLIC BLOOD PRESSURE: 53 MMHG | TEMPERATURE: 99 F | SYSTOLIC BLOOD PRESSURE: 141 MMHG | RESPIRATION RATE: 16 BRPM | OXYGEN SATURATION: 96 % | BODY MASS INDEX: 33.98 KG/M2

## 2022-03-30 RX ORDER — IBUPROFEN 600 MG/1
600 TABLET ORAL EVERY 8 HOURS PRN
Qty: 30 TABLET | Refills: 0 | Status: SHIPPED | OUTPATIENT
Start: 2022-03-30

## 2022-03-30 RX ORDER — HYDROCODONE BITARTRATE AND ACETAMINOPHEN 5; 325 MG/1; MG/1
1-2 TABLET ORAL EVERY 4 HOURS PRN
Qty: 20 TABLET | Refills: 0 | Status: SHIPPED | OUTPATIENT
Start: 2022-03-30

## 2022-03-30 NOTE — PLAN OF CARE
Problem: PAIN - ADULT  Goal: Verbalizes/displays adequate comfort level or patient's stated pain goal  Description: INTERVENTIONS:  - Encourage pt to monitor pain and request assistance  - Assess pain using appropriate pain scale  - Administer analgesics based on type and severity of pain and evaluate response  - Implement non-pharmacological measures as appropriate and evaluate response  - Consider cultural and social influences on pain and pain management  - Manage/alleviate anxiety  - Utilize distraction and/or relaxation techniques  - Monitor for opioid side effects  - Notify MD/LIP if interventions unsuccessful or patient reports new pain  - Anticipate increased pain with activity and pre-medicate as appropriate  Outcome: Adequate for Discharge     Problem: RISK FOR INFECTION - ADULT  Goal: Absence of fever/infection during anticipated neutropenic period  Description: INTERVENTIONS  - Monitor WBC  - Administer growth factors as ordered  - Implement neutropenic guidelines  Outcome: Adequate for Discharge     Problem: SAFETY ADULT - FALL  Goal: Free from fall injury  Description: INTERVENTIONS:  - Assess pt frequently for physical needs  - Identify cognitive and physical deficits and behaviors that affect risk of falls.   - [place] fall precautions as indicated by assessment.  - Educate pt/family on patient safety including physical limitations  - Instruct pt to call for assistance with activity based on assessment  - Modify environment to reduce risk of injury  - Provide assistive devices as appropriate  - Consider OT/PT consult to assist with strengthening/mobility  - Encourage toileting schedule  Outcome: Adequate for Discharge     Problem: DISCHARGE PLANNING  Goal: Discharge to home or other facility with appropriate resources  Description: INTERVENTIONS:  - Identify barriers to discharge w/pt and caregiver  - Include patient/family/discharge partner in discharge planning  - Arrange for needed discharge resources and transportation as appropriate  - Identify discharge learning needs (meds, wound care, etc)  - Arrange for interpreters to assist at discharge as needed  - Consider post-discharge preferences of patient/family/discharge partner  - Complete POLST form as appropriate  - Assess patient's ability to be responsible for managing their own health  - Refer to Case Management Department for coordinating discharge planning if the patient needs post-hospital services based on physician/LIP order or complex needs related to functional status, cognitive ability or social support system  Outcome: Adequate for Discharge   Alert and orient  x 4 , on room air , vitals stable . Up in room , tolerated diet well . Abdominal incision dry and intact. Voids well . Plan of care discussed , answered all questions . Verbalized understanding .  Will continue to monitor

## 2022-03-30 NOTE — PROGRESS NOTES
NURSING DISCHARGE NOTE    Discharged Home via Wheelchair. Accompanied by Family member and Support staff  Belongings Taken by patient/family. discussed discharge instructions with patient , answered all questions ,verbalized understanding .  Pharmacy delivered all new medicines

## 2022-03-30 NOTE — DISCHARGE SUMMARY
[facility]    Discharge Summary    Faheem Waller [facility] Patient Status:  Inpatient    1945 MRN [id number]   [facility] 3NW-A Attending Petrona Garrett, [address] [facility] Day # 2 PCP Henri Hale MD     Date of Admission: 3/28/2022    Date of Discharge: 3/30/2022     Admission Diagnoses:   Complex endometrial hyperplasia with atypia     Discharge Diagnosis:   Status post total abdominal hysterectomy, bilateral salpingo oophorectomy    Primary GYN Clinician: [facility] Course:     76year old  admitted for scheduled procedure, which was uncomplicated. She was stable for discharge on postop day #2. Surgical Procedures     Case IDs Date Procedure Surgeon Location Status    [id number] 3/28/22 Total Abdominal Hysterectomy, Bilateral Salpingo-oophorectomy Harry Edmonds, DO [address] [facility] OR [facility]        Discharge Plan:   Discharge Condition: Stable  Early Discharge:  NO    Discharge medications:  Current Discharge Medication List    New Orders    HYDROcodone-acetaminophen 5-325 MG Oral Tab  Take 1-2 tablets by mouth every 4 (four) hours as needed for Pain. ibuprofen 600 MG Oral Tab  Take 1 tablet (600 mg total) by mouth every 8 (eight) hours as needed for Pain. Home Meds - Unchanged    atorvastatin 40 MG Oral Tab  Take 40 mg by mouth nightly. levothyroxine 50 MCG Oral Tab  Take 50 mcg by mouth before breakfast.    lisinopril 5 MG Oral Tab  Take 5 mg by mouth daily. metoprolol tartrate 25 MG Oral Tab  Take 12.5 mg by mouth 2 (two) times daily. Naproxen Sodium 220 MG Oral Cap  Take 440 mg by mouth every 6 (six) hours as needed. PREMARIN 0.625 MG/GM Vaginal Cream  Place 0.5 g vaginally daily as needed. aspirin 325 MG Oral Tab  Take 325 mg by mouth daily. Discharge Diet: As tolerated    Discharge Activity: As tolerated    Follow up: Follow-up Information     Petrona Garrett DO. Schedule an appointment as soon as possible for a visit in 6 weeks. Specialty: OBSTETRICS & GYNECOLOGY  Why: Postop visit   Contact information:  [address] [facility]  [address]                         Follow up Labs: Other Discharge Instructions:       Nothing in the vagina for 6-8 weeks. No lifting more than 10 lb. No strenuous activity/excercise  No tub baths. May shower. Keep wound(s) clean and dry. Wash daily with warm water & soap. Do not scrub. Gently pat dry. May cover with clean gauze or pad if needed. Can wash with Hibiclens over the abdomen/torso to help decrease bacterial colonization if you prefer. It is alcohol-based so it can be somewhat drying. Do NOT strain for a bowel movement. Drinking plenty of fluids  It is recommended to take a stool softener twice daily (e.g. Colace (docusate) twice a day)  Can take Miralax once or twice daily to help keep bowel movements soft & moving forward as well if you do not think the colace is aggressive enough for you. May also try Milk of Magnesia for constipation as well. This is a stimulant laxative and usually will result in a bowel movement within a handful of hours. Call your physician's office if temperature is 100.4 or greater, you have increasing pain, redness or purulent drainage from incision sites, vaginal bleeding more than 1 pad per hour, chest pain, shortness of breath, leg pain or swelling. Call office for any questions or concerns.          Esau Osorio MD  3/30/2022

## 2022-04-01 RX ORDER — LEVOTHYROXINE SODIUM 0.05 MG/1
TABLET ORAL
Qty: 90 TABLET | Refills: 0 | Status: SHIPPED | OUTPATIENT
Start: 2022-04-01

## 2022-04-01 RX ORDER — LISINOPRIL 5 MG/1
TABLET ORAL
Qty: 90 TABLET | Refills: 0 | Status: SHIPPED | OUTPATIENT
Start: 2022-04-01

## 2022-04-29 ENCOUNTER — OFFICE VISIT (OUTPATIENT)
Dept: OBGYN CLINIC | Facility: CLINIC | Age: 77
End: 2022-04-29
Payer: MEDICARE

## 2022-04-29 VITALS
BODY MASS INDEX: 39.85 KG/M2 | WEIGHT: 203 LBS | HEART RATE: 63 BPM | HEIGHT: 60 IN | SYSTOLIC BLOOD PRESSURE: 122 MMHG | DIASTOLIC BLOOD PRESSURE: 76 MMHG

## 2022-04-29 DIAGNOSIS — Z09 POSTOP CHECK: Primary | ICD-10-CM

## 2022-04-29 PROCEDURE — 3078F DIAST BP <80 MM HG: CPT | Performed by: OBSTETRICS & GYNECOLOGY

## 2022-04-29 PROCEDURE — 3008F BODY MASS INDEX DOCD: CPT | Performed by: OBSTETRICS & GYNECOLOGY

## 2022-04-29 PROCEDURE — 1111F DSCHRG MED/CURRENT MED MERGE: CPT | Performed by: OBSTETRICS & GYNECOLOGY

## 2022-04-29 PROCEDURE — 3074F SYST BP LT 130 MM HG: CPT | Performed by: OBSTETRICS & GYNECOLOGY

## 2022-07-05 RX ORDER — LISINOPRIL 5 MG/1
TABLET ORAL
Qty: 90 TABLET | Refills: 0 | Status: SHIPPED | OUTPATIENT
Start: 2022-07-05

## 2022-07-05 RX ORDER — LEVOTHYROXINE SODIUM 0.05 MG/1
TABLET ORAL
Qty: 90 TABLET | Refills: 0 | Status: SHIPPED | OUTPATIENT
Start: 2022-07-05

## 2022-07-28 RX ORDER — ATORVASTATIN CALCIUM 40 MG/1
TABLET, FILM COATED ORAL
Qty: 90 TABLET | Refills: 0 | Status: SHIPPED | OUTPATIENT
Start: 2022-07-28

## 2022-08-09 ENCOUNTER — OFFICE VISIT (OUTPATIENT)
Dept: INTERNAL MEDICINE CLINIC | Facility: CLINIC | Age: 77
End: 2022-08-09
Payer: MEDICARE

## 2022-08-09 VITALS
HEIGHT: 64.75 IN | RESPIRATION RATE: 16 BRPM | OXYGEN SATURATION: 97 % | TEMPERATURE: 97 F | SYSTOLIC BLOOD PRESSURE: 118 MMHG | WEIGHT: 202 LBS | HEART RATE: 66 BPM | BODY MASS INDEX: 34.07 KG/M2 | DIASTOLIC BLOOD PRESSURE: 70 MMHG

## 2022-08-09 DIAGNOSIS — I70.0 ATHEROSCLEROSIS OF AORTA (HCC): ICD-10-CM

## 2022-08-09 DIAGNOSIS — Z86.73 HISTORY OF STROKE: ICD-10-CM

## 2022-08-09 DIAGNOSIS — I47.1 SVT (SUPRAVENTRICULAR TACHYCARDIA) (HCC): ICD-10-CM

## 2022-08-09 DIAGNOSIS — I10 ESSENTIAL HYPERTENSION: ICD-10-CM

## 2022-08-09 DIAGNOSIS — M47.816 LUMBAR SPONDYLOSIS: ICD-10-CM

## 2022-08-09 DIAGNOSIS — D62 ACUTE BLOOD LOSS ANEMIA: ICD-10-CM

## 2022-08-09 DIAGNOSIS — M47.812 CERVICAL SPONDYLOSIS: ICD-10-CM

## 2022-08-09 DIAGNOSIS — E03.9 ACQUIRED HYPOTHYROIDISM: ICD-10-CM

## 2022-08-09 DIAGNOSIS — R73.03 PRE-DIABETES: ICD-10-CM

## 2022-08-09 DIAGNOSIS — I77.1 TORTUOUS AORTA (HCC): ICD-10-CM

## 2022-08-09 DIAGNOSIS — I65.23 BILATERAL CAROTID ARTERY STENOSIS: ICD-10-CM

## 2022-08-09 DIAGNOSIS — E78.5 DYSLIPIDEMIA: ICD-10-CM

## 2022-08-09 DIAGNOSIS — Z00.00 ENCOUNTER FOR ANNUAL HEALTH EXAMINATION: Primary | ICD-10-CM

## 2022-08-09 PROBLEM — Z90.710 STATUS POST TOTAL ABDOMINAL HYSTERECTOMY AND BILATERAL SALPINGO-OOPHORECTOMY (TAH-BSO): Status: RESOLVED | Noted: 2022-03-28 | Resolved: 2022-08-09

## 2022-08-09 PROBLEM — Z90.722 STATUS POST TOTAL ABDOMINAL HYSTERECTOMY AND BILATERAL SALPINGO-OOPHORECTOMY (TAH-BSO): Status: RESOLVED | Noted: 2022-03-28 | Resolved: 2022-08-09

## 2022-08-09 PROBLEM — N85.02 COMPLEX ENDOMETRIAL HYPERPLASIA WITH ATYPIA: Status: RESOLVED | Noted: 2022-03-18 | Resolved: 2022-08-09

## 2022-08-09 PROBLEM — Z90.79 STATUS POST TOTAL ABDOMINAL HYSTERECTOMY AND BILATERAL SALPINGO-OOPHORECTOMY (TAH-BSO): Status: RESOLVED | Noted: 2022-03-28 | Resolved: 2022-08-09

## 2022-08-09 PROCEDURE — G0439 PPPS, SUBSEQ VISIT: HCPCS | Performed by: INTERNAL MEDICINE

## 2022-08-09 PROCEDURE — 3008F BODY MASS INDEX DOCD: CPT | Performed by: INTERNAL MEDICINE

## 2022-08-09 PROCEDURE — 99397 PER PM REEVAL EST PAT 65+ YR: CPT | Performed by: INTERNAL MEDICINE

## 2022-08-09 PROCEDURE — 3078F DIAST BP <80 MM HG: CPT | Performed by: INTERNAL MEDICINE

## 2022-08-09 PROCEDURE — 3074F SYST BP LT 130 MM HG: CPT | Performed by: INTERNAL MEDICINE

## 2022-08-09 PROCEDURE — 96160 PT-FOCUSED HLTH RISK ASSMT: CPT | Performed by: INTERNAL MEDICINE

## 2022-08-09 PROCEDURE — 1125F AMNT PAIN NOTED PAIN PRSNT: CPT | Performed by: INTERNAL MEDICINE

## 2022-08-16 ENCOUNTER — LAB ENCOUNTER (OUTPATIENT)
Dept: LAB | Age: 77
End: 2022-08-16
Attending: INTERNAL MEDICINE
Payer: MEDICARE

## 2022-08-16 DIAGNOSIS — E78.5 DYSLIPIDEMIA: ICD-10-CM

## 2022-08-16 DIAGNOSIS — R73.03 PRE-DIABETES: ICD-10-CM

## 2022-08-16 DIAGNOSIS — E03.9 ACQUIRED HYPOTHYROIDISM: ICD-10-CM

## 2022-08-16 DIAGNOSIS — D62 ACUTE BLOOD LOSS ANEMIA: ICD-10-CM

## 2022-08-16 LAB
ALBUMIN SERPL-MCNC: 3.4 G/DL (ref 3.4–5)
ALBUMIN/GLOB SERPL: 1.1 {RATIO} (ref 1–2)
ALP LIVER SERPL-CCNC: 93 U/L
ALT SERPL-CCNC: 20 U/L
ANION GAP SERPL CALC-SCNC: 4 MMOL/L (ref 0–18)
AST SERPL-CCNC: 16 U/L (ref 15–37)
BASOPHILS # BLD AUTO: 0.03 X10(3) UL (ref 0–0.2)
BASOPHILS NFR BLD AUTO: 0.4 %
BILIRUB SERPL-MCNC: 0.6 MG/DL (ref 0.1–2)
BUN BLD-MCNC: 19 MG/DL (ref 7–18)
CALCIUM BLD-MCNC: 8.8 MG/DL (ref 8.5–10.1)
CHLORIDE SERPL-SCNC: 109 MMOL/L (ref 98–112)
CHOLEST SERPL-MCNC: 158 MG/DL (ref ?–200)
CO2 SERPL-SCNC: 27 MMOL/L (ref 21–32)
CREAT BLD-MCNC: 0.9 MG/DL
EOSINOPHIL # BLD AUTO: 0.19 X10(3) UL (ref 0–0.7)
EOSINOPHIL NFR BLD AUTO: 2.3 %
ERYTHROCYTE [DISTWIDTH] IN BLOOD BY AUTOMATED COUNT: 12.8 %
EST. AVERAGE GLUCOSE BLD GHB EST-MCNC: 134 MG/DL (ref 68–126)
FASTING PATIENT LIPID ANSWER: YES
FASTING STATUS PATIENT QL REPORTED: YES
GFR SERPLBLD BASED ON 1.73 SQ M-ARVRAT: 65 ML/MIN/1.73M2 (ref 60–?)
GLOBULIN PLAS-MCNC: 3.1 G/DL (ref 2.8–4.4)
GLUCOSE BLD-MCNC: 95 MG/DL (ref 70–99)
HBA1C MFR BLD: 6.3 % (ref ?–5.7)
HCT VFR BLD AUTO: 40.9 %
HDLC SERPL-MCNC: 59 MG/DL (ref 40–59)
HGB BLD-MCNC: 13 G/DL
IMM GRANULOCYTES # BLD AUTO: 0.01 X10(3) UL (ref 0–1)
IMM GRANULOCYTES NFR BLD: 0.1 %
LDLC SERPL CALC-MCNC: 82 MG/DL (ref ?–100)
LYMPHOCYTES # BLD AUTO: 3.81 X10(3) UL (ref 1–4)
LYMPHOCYTES NFR BLD AUTO: 46.4 %
MCH RBC QN AUTO: 29.6 PG (ref 26–34)
MCHC RBC AUTO-ENTMCNC: 31.8 G/DL (ref 31–37)
MCV RBC AUTO: 93.2 FL
MONOCYTES # BLD AUTO: 0.83 X10(3) UL (ref 0.1–1)
MONOCYTES NFR BLD AUTO: 10.1 %
NEUTROPHILS # BLD AUTO: 3.35 X10 (3) UL (ref 1.5–7.7)
NEUTROPHILS # BLD AUTO: 3.35 X10(3) UL (ref 1.5–7.7)
NEUTROPHILS NFR BLD AUTO: 40.7 %
NONHDLC SERPL-MCNC: 99 MG/DL (ref ?–130)
OSMOLALITY SERPL CALC.SUM OF ELEC: 292 MOSM/KG (ref 275–295)
PLATELET # BLD AUTO: 235 10(3)UL (ref 150–450)
POTASSIUM SERPL-SCNC: 4.2 MMOL/L (ref 3.5–5.1)
PROT SERPL-MCNC: 6.5 G/DL (ref 6.4–8.2)
RBC # BLD AUTO: 4.39 X10(6)UL
SODIUM SERPL-SCNC: 140 MMOL/L (ref 136–145)
TRIGL SERPL-MCNC: 95 MG/DL (ref 30–149)
TSI SER-ACNC: 2.81 MIU/ML (ref 0.36–3.74)
VLDLC SERPL CALC-MCNC: 15 MG/DL (ref 0–30)
WBC # BLD AUTO: 8.2 X10(3) UL (ref 4–11)

## 2022-08-16 PROCEDURE — 36415 COLL VENOUS BLD VENIPUNCTURE: CPT

## 2022-08-16 PROCEDURE — 80053 COMPREHEN METABOLIC PANEL: CPT

## 2022-08-16 PROCEDURE — 83036 HEMOGLOBIN GLYCOSYLATED A1C: CPT

## 2022-08-16 PROCEDURE — 84443 ASSAY THYROID STIM HORMONE: CPT

## 2022-08-16 PROCEDURE — 80061 LIPID PANEL: CPT

## 2022-08-16 PROCEDURE — 85025 COMPLETE CBC W/AUTO DIFF WBC: CPT

## 2022-10-04 RX ORDER — LISINOPRIL 5 MG/1
5 TABLET ORAL DAILY
Qty: 90 TABLET | Refills: 0 | Status: SHIPPED | OUTPATIENT
Start: 2022-10-04

## 2022-10-04 RX ORDER — LEVOTHYROXINE SODIUM 0.05 MG/1
50 TABLET ORAL
Qty: 90 TABLET | Refills: 0 | Status: SHIPPED | OUTPATIENT
Start: 2022-10-04

## 2022-10-19 ENCOUNTER — OFFICE VISIT (OUTPATIENT)
Dept: ORTHOPEDICS CLINIC | Facility: CLINIC | Age: 77
End: 2022-10-19
Payer: MEDICARE

## 2022-10-19 DIAGNOSIS — M47.816 ARTHRITIS OF LUMBAR SPINE: Primary | ICD-10-CM

## 2022-10-19 DIAGNOSIS — M54.16 LUMBAR RADICULOPATHY: ICD-10-CM

## 2022-10-19 DIAGNOSIS — M17.12 PRIMARY OSTEOARTHRITIS OF LEFT KNEE: ICD-10-CM

## 2022-10-19 PROCEDURE — 99214 OFFICE O/P EST MOD 30 MIN: CPT | Performed by: ORTHOPAEDIC SURGERY

## 2022-10-19 PROCEDURE — 20610 DRAIN/INJ JOINT/BURSA W/O US: CPT | Performed by: ORTHOPAEDIC SURGERY

## 2022-10-19 RX ORDER — TRIAMCINOLONE ACETONIDE 40 MG/ML
40 INJECTION, SUSPENSION INTRA-ARTICULAR; INTRAMUSCULAR ONCE
Status: COMPLETED | OUTPATIENT
Start: 2022-10-19 | End: 2022-10-19

## 2022-10-19 RX ADMIN — TRIAMCINOLONE ACETONIDE 40 MG: 40 INJECTION, SUSPENSION INTRA-ARTICULAR; INTRAMUSCULAR at 10:56:00

## 2022-12-29 DIAGNOSIS — E03.9 ACQUIRED HYPOTHYROIDISM: ICD-10-CM

## 2022-12-29 DIAGNOSIS — I10 ESSENTIAL HYPERTENSION: Primary | ICD-10-CM

## 2022-12-29 RX ORDER — LEVOTHYROXINE SODIUM 0.05 MG/1
TABLET ORAL
Qty: 60 TABLET | Refills: 0 | Status: SHIPPED | OUTPATIENT
Start: 2022-12-29

## 2022-12-29 RX ORDER — LISINOPRIL 5 MG/1
TABLET ORAL
Qty: 60 TABLET | Refills: 0 | Status: SHIPPED | OUTPATIENT
Start: 2022-12-29

## 2023-03-04 DIAGNOSIS — E03.9 ACQUIRED HYPOTHYROIDISM: ICD-10-CM

## 2023-03-06 RX ORDER — LEVOTHYROXINE SODIUM 0.05 MG/1
TABLET ORAL
Qty: 90 TABLET | Refills: 0 | OUTPATIENT
Start: 2023-03-06

## 2023-03-08 DIAGNOSIS — I10 ESSENTIAL HYPERTENSION: ICD-10-CM

## 2023-03-08 RX ORDER — LISINOPRIL 5 MG/1
TABLET ORAL
Qty: 90 TABLET | Refills: 0 | Status: SHIPPED
Start: 2023-03-08

## 2023-03-08 NOTE — TELEPHONE ENCOUNTER
Hypertension Medications Protocol Passed 03/08/2023 07:04 AM   Protocol Details  CMP or BMP in past 12 months    Last serum creatinine< 2.0    Appointment in past 6 or next 3 months        Future Appointments   Date Time Provider Livia Arreola   4/13/2023  9:20 AM Deb Stockton MD [facility] [place]   Refilled per protocol. Orders faxed to [employer], fax confirmed.

## 2023-04-13 ENCOUNTER — OFFICE VISIT (OUTPATIENT)
Dept: INTERNAL MEDICINE CLINIC | Facility: CLINIC | Age: 78
End: 2023-04-13
Payer: MEDICARE

## 2023-04-13 ENCOUNTER — TELEPHONE (OUTPATIENT)
Dept: INTERNAL MEDICINE CLINIC | Facility: CLINIC | Age: 78
End: 2023-04-13

## 2023-04-13 ENCOUNTER — LAB ENCOUNTER (OUTPATIENT)
Dept: LAB | Age: 78
End: 2023-04-13
Attending: INTERNAL MEDICINE
Payer: MEDICARE

## 2023-04-13 VITALS
HEIGHT: 64 IN | DIASTOLIC BLOOD PRESSURE: 70 MMHG | OXYGEN SATURATION: 97 % | WEIGHT: 202 LBS | HEART RATE: 80 BPM | TEMPERATURE: 98 F | BODY MASS INDEX: 34.49 KG/M2 | SYSTOLIC BLOOD PRESSURE: 130 MMHG | RESPIRATION RATE: 20 BRPM

## 2023-04-13 DIAGNOSIS — I70.0 ATHEROSCLEROSIS OF AORTA (HCC): ICD-10-CM

## 2023-04-13 DIAGNOSIS — Z78.0 POSTMENOPAUSAL: ICD-10-CM

## 2023-04-13 DIAGNOSIS — M47.816 LUMBAR SPONDYLOSIS: ICD-10-CM

## 2023-04-13 DIAGNOSIS — R73.03 PRE-DIABETES: ICD-10-CM

## 2023-04-13 DIAGNOSIS — Z86.73 HISTORY OF STROKE: ICD-10-CM

## 2023-04-13 DIAGNOSIS — E78.5 DYSLIPIDEMIA: ICD-10-CM

## 2023-04-13 DIAGNOSIS — M47.812 CERVICAL SPONDYLOSIS: ICD-10-CM

## 2023-04-13 DIAGNOSIS — E03.9 ACQUIRED HYPOTHYROIDISM: ICD-10-CM

## 2023-04-13 DIAGNOSIS — Z12.31 ENCOUNTER FOR SCREENING MAMMOGRAM FOR MALIGNANT NEOPLASM OF BREAST: ICD-10-CM

## 2023-04-13 DIAGNOSIS — I10 ESSENTIAL HYPERTENSION: ICD-10-CM

## 2023-04-13 DIAGNOSIS — I77.9 BILATERAL CAROTID ARTERY DISEASE, UNSPECIFIED TYPE (HCC): ICD-10-CM

## 2023-04-13 DIAGNOSIS — R60.0 LEG EDEMA, LEFT: ICD-10-CM

## 2023-04-13 DIAGNOSIS — Z00.00 ENCOUNTER FOR ANNUAL HEALTH EXAMINATION: Primary | ICD-10-CM

## 2023-04-13 DIAGNOSIS — I77.1 TORTUOUS AORTA (HCC): ICD-10-CM

## 2023-04-13 DIAGNOSIS — I47.1 SVT (SUPRAVENTRICULAR TACHYCARDIA) (HCC): ICD-10-CM

## 2023-04-13 DIAGNOSIS — L98.9 SKIN LESION: ICD-10-CM

## 2023-04-13 LAB
ALBUMIN SERPL-MCNC: 3.4 G/DL (ref 3.4–5)
ALBUMIN/GLOB SERPL: 1 {RATIO} (ref 1–2)
ALP LIVER SERPL-CCNC: 95 U/L
ALT SERPL-CCNC: 23 U/L
ANION GAP SERPL CALC-SCNC: 4 MMOL/L (ref 0–18)
AST SERPL-CCNC: 24 U/L (ref 15–37)
BILIRUB SERPL-MCNC: 0.7 MG/DL (ref 0.1–2)
BUN BLD-MCNC: 27 MG/DL (ref 7–18)
CALCIUM BLD-MCNC: 9 MG/DL (ref 8.5–10.1)
CHLORIDE SERPL-SCNC: 109 MMOL/L (ref 98–112)
CO2 SERPL-SCNC: 28 MMOL/L (ref 21–32)
CREAT BLD-MCNC: 0.95 MG/DL
EST. AVERAGE GLUCOSE BLD GHB EST-MCNC: 134 MG/DL (ref 68–126)
GFR SERPLBLD BASED ON 1.73 SQ M-ARVRAT: 61 ML/MIN/1.73M2 (ref 60–?)
GLOBULIN PLAS-MCNC: 3.5 G/DL (ref 2.8–4.4)
GLUCOSE BLD-MCNC: 87 MG/DL (ref 70–99)
HBA1C MFR BLD: 6.3 % (ref ?–5.7)
OSMOLALITY SERPL CALC.SUM OF ELEC: 296 MOSM/KG (ref 275–295)
POTASSIUM SERPL-SCNC: 4.3 MMOL/L (ref 3.5–5.1)
PROT SERPL-MCNC: 6.9 G/DL (ref 6.4–8.2)
SODIUM SERPL-SCNC: 141 MMOL/L (ref 136–145)
TSI SER-ACNC: 1.71 MIU/ML (ref 0.36–3.74)

## 2023-04-13 PROCEDURE — 80053 COMPREHEN METABOLIC PANEL: CPT

## 2023-04-13 PROCEDURE — 84443 ASSAY THYROID STIM HORMONE: CPT

## 2023-04-13 PROCEDURE — 36415 COLL VENOUS BLD VENIPUNCTURE: CPT

## 2023-04-13 PROCEDURE — 83036 HEMOGLOBIN GLYCOSYLATED A1C: CPT

## 2023-04-13 NOTE — TELEPHONE ENCOUNTER
Documents have been faxed to [facility]/ Alva and Confirmation Received.  For recent Imaging report

## 2023-04-14 NOTE — TELEPHONE ENCOUNTER
Incoming (mail or fax):  fax  Received from:  [facility]  Documentation given to:  Ban Nugent

## 2023-04-17 ENCOUNTER — TELEPHONE (OUTPATIENT)
Dept: INTERNAL MEDICINE CLINIC | Facility: CLINIC | Age: 78
End: 2023-04-17

## 2023-04-17 NOTE — TELEPHONE ENCOUNTER
Pt called and was returning Kristyn's call.  Pt was asked to call back wit the location of her last mammogram. she had her last mammogram at [address] [place] [address] and their phone # is [phone]  Thanks

## 2023-04-25 DIAGNOSIS — E78.5 DYSLIPIDEMIA: Primary | ICD-10-CM

## 2023-04-25 RX ORDER — ATORVASTATIN CALCIUM 40 MG/1
TABLET, FILM COATED ORAL
Qty: 90 TABLET | Refills: 1 | Status: SHIPPED | OUTPATIENT
Start: 2023-04-25

## 2023-04-25 NOTE — TELEPHONE ENCOUNTER
Cholesterol Medication Protocol Passed 04/25/2023 02:53 PM   Protocol Details  ALT < 80    ALT resulted within past year    Lipid panel within past 12 months    Appointment within past 12 or next 3 months   3. Dyslipidemia  Continue statin. Refilled per protocol.

## 2023-06-03 DIAGNOSIS — I10 ESSENTIAL HYPERTENSION: ICD-10-CM

## 2023-07-10 DIAGNOSIS — E03.9 ACQUIRED HYPOTHYROIDISM: ICD-10-CM

## 2023-07-10 RX ORDER — LEVOTHYROXINE SODIUM 0.05 MG/1
50 TABLET ORAL
Qty: 90 TABLET | Refills: 0 | Status: SHIPPED | OUTPATIENT
Start: 2023-07-10

## 2023-07-10 NOTE — TELEPHONE ENCOUNTER
Is this medication prescribed by the [facility] Providers? yes    Did the patient contact the pharmacy directly?:  yes    Is patient out of meds or supply very low?:  low    Medication Requested:  LEVOTHYROXINE 50 MCG Oral Tab     Dose:  50    Is patient requesting a 30 or 90 day supply?:  90    Pharmacy name and phone # or location:    [address] [place] [address], [phone], [phone]       Is the patient due for an appointment?: will cb to schedule med check in October   (if so, please schedule appt)    Additional Notes:  sending high priority as patient is completely out of meds      Please advise the patient refills take up to 72 business hours.

## 2023-07-10 NOTE — TELEPHONE ENCOUNTER
Thyroid Supplements Protocol Lknvra33/10/2023 10:41 AM   Protocol Details TSH test in past 12 months    TSH value between 0.350 and 5.500 IU/ml    Appointment in past 12 or next 3 months   9. Acquired hypothyroidism  Continue levothyroxine. Refilled per protocol.

## 2023-08-31 DIAGNOSIS — I10 ESSENTIAL HYPERTENSION: ICD-10-CM

## 2023-09-01 RX ORDER — LISINOPRIL 5 MG/1
5 TABLET ORAL DAILY
Qty: 90 TABLET | Refills: 0 | Status: SHIPPED | OUTPATIENT
Start: 2023-09-01

## 2023-09-20 ENCOUNTER — OFFICE VISIT (OUTPATIENT)
Dept: INTERNAL MEDICINE CLINIC | Facility: CLINIC | Age: 78
End: 2023-09-20
Payer: MEDICARE

## 2023-09-20 ENCOUNTER — HOSPITAL ENCOUNTER (OUTPATIENT)
Dept: GENERAL RADIOLOGY | Age: 78
Discharge: HOME | End: 2023-09-20
Attending: NURSE PRACTITIONER
Payer: MEDICARE

## 2023-09-20 VITALS
OXYGEN SATURATION: 98 % | WEIGHT: 206.38 LBS | BODY MASS INDEX: 35.23 KG/M2 | HEART RATE: 57 BPM | HEIGHT: 64 IN | DIASTOLIC BLOOD PRESSURE: 64 MMHG | RESPIRATION RATE: 16 BRPM | SYSTOLIC BLOOD PRESSURE: 132 MMHG | TEMPERATURE: 97 F

## 2023-09-20 DIAGNOSIS — M25.561 ACUTE PAIN OF RIGHT KNEE: Primary | ICD-10-CM

## 2023-09-20 DIAGNOSIS — M25.561 ACUTE PAIN OF RIGHT KNEE: ICD-10-CM

## 2023-09-20 PROCEDURE — 3008F BODY MASS INDEX DOCD: CPT | Performed by: NURSE PRACTITIONER

## 2023-09-20 PROCEDURE — 1159F MED LIST DOCD IN RCRD: CPT | Performed by: NURSE PRACTITIONER

## 2023-09-20 PROCEDURE — 3078F DIAST BP <80 MM HG: CPT | Performed by: NURSE PRACTITIONER

## 2023-09-20 PROCEDURE — 3075F SYST BP GE 130 - 139MM HG: CPT | Performed by: NURSE PRACTITIONER

## 2023-09-20 PROCEDURE — 1170F FXNL STATUS ASSESSED: CPT | Performed by: NURSE PRACTITIONER

## 2023-09-20 PROCEDURE — 99214 OFFICE O/P EST MOD 30 MIN: CPT | Performed by: NURSE PRACTITIONER

## 2023-09-20 PROCEDURE — 1125F AMNT PAIN NOTED PAIN PRSNT: CPT | Performed by: NURSE PRACTITIONER

## 2023-09-20 PROCEDURE — 73562 X-RAY EXAM OF KNEE 3: CPT | Performed by: NURSE PRACTITIONER

## 2023-09-20 RX ORDER — METHYLPREDNISOLONE 4 MG/1
TABLET ORAL
Qty: 1 EACH | Refills: 0 | Status: SHIPPED | OUTPATIENT
Start: 2023-09-20

## 2023-09-28 ENCOUNTER — TELEPHONE (OUTPATIENT)
Dept: INTERNAL MEDICINE CLINIC | Facility: CLINIC | Age: 78
End: 2023-09-28

## 2023-09-28 DIAGNOSIS — M25.561 ACUTE PAIN OF RIGHT KNEE: Primary | ICD-10-CM

## 2023-09-28 NOTE — TELEPHONE ENCOUNTER
Patient saw Navdeep Turner on 9/20/23 for knee pain. Her knee is not feeling better so she's wondering if Navdeep Turner will place an order for PT. Please advise. Thank you!

## 2023-09-28 NOTE — TELEPHONE ENCOUNTER
Order signed. She should let us know if symptoms worsen or don't improve after PT. May need to see ortho at the time. If she prefers, we can give her a referral for ortho right now as well. Thanks.

## 2023-09-28 NOTE — TELEPHONE ENCOUNTER
Patient notified.  Patient verbalized understanding   Referral placed for ortho as well     Future Appointments   Date Time Provider Livia Lali   10/13/2023  8:20 AM Johnny Aragon MD [facility] G[facility]ell Ayala   10/18/2023  2:40 PM Yossi Lopez MD [id number]

## 2023-10-11 DIAGNOSIS — E03.9 ACQUIRED HYPOTHYROIDISM: ICD-10-CM

## 2023-10-11 RX ORDER — LEVOTHYROXINE SODIUM 0.05 MG/1
50 TABLET ORAL
Qty: 90 TABLET | Refills: 0 | Status: SHIPPED | OUTPATIENT
Start: 2023-10-11

## 2023-10-11 NOTE — TELEPHONE ENCOUNTER
Is this medication prescribed by the [facility] Providers? yes    Did the patient contact the pharmacy directly?:  yes - pharmacy called and said they have faxed the request 4 times    Is patient out of meds or supply very low?:  out    Medication Requested:  levothyroxine Oral Tab     Dose:  50 MCG    Is patient requesting a 30 or 90 day supply?:  90    Pharmacy name and phone # or location:  [facility] #[id number] - [facility], [address] [place] [address] 19, [phone], [phone] [[id number]]     Is the patient due for an appointment?: 6 month med check scheduled on 10/13/23  (if so, please schedule appt)    Additional Notes:  none    Please advise the patient refills take up to 72 business hours.

## 2023-10-11 NOTE — TELEPHONE ENCOUNTER
Thyroid Supplements Protocol Rmcrav75/11/2023 01:43 PM   Protocol Details TSH test in past 12 months    TSH value between 0.350 and 5.500 IU/ml    Appointment in past 12 or next 3 month       Future Appointments   Date Time Provider Livia Arreola   10/13/2023  8:20 AM Karlene Escobar MD [facility] [place] Andersen

## 2023-10-13 ENCOUNTER — OFFICE VISIT (OUTPATIENT)
Dept: INTERNAL MEDICINE CLINIC | Facility: CLINIC | Age: 78
End: 2023-10-13
Payer: MEDICARE

## 2023-10-13 VITALS
BODY MASS INDEX: 34.57 KG/M2 | SYSTOLIC BLOOD PRESSURE: 140 MMHG | HEART RATE: 64 BPM | WEIGHT: 202.5 LBS | RESPIRATION RATE: 12 BRPM | HEIGHT: 64 IN | TEMPERATURE: 98 F | DIASTOLIC BLOOD PRESSURE: 76 MMHG

## 2023-10-13 DIAGNOSIS — I77.9 BILATERAL CAROTID ARTERY DISEASE, UNSPECIFIED TYPE (HCC): ICD-10-CM

## 2023-10-13 DIAGNOSIS — R73.03 PRE-DIABETES: ICD-10-CM

## 2023-10-13 DIAGNOSIS — I10 ESSENTIAL HYPERTENSION: ICD-10-CM

## 2023-10-13 DIAGNOSIS — I47.10 SVT (SUPRAVENTRICULAR TACHYCARDIA): ICD-10-CM

## 2023-10-13 DIAGNOSIS — M25.561 ACUTE PAIN OF RIGHT KNEE: ICD-10-CM

## 2023-10-13 DIAGNOSIS — E03.9 ACQUIRED HYPOTHYROIDISM: ICD-10-CM

## 2023-10-13 DIAGNOSIS — Z23 NEED FOR VACCINATION: Primary | ICD-10-CM

## 2023-10-13 DIAGNOSIS — E78.5 DYSLIPIDEMIA: ICD-10-CM

## 2023-10-13 RX ORDER — ACETAMINOPHEN AND CODEINE PHOSPHATE 300; 30 MG/1; MG/1
1 TABLET ORAL EVERY 6 HOURS PRN
Qty: 30 TABLET | Refills: 0 | Status: SHIPPED | OUTPATIENT
Start: 2023-10-13

## 2023-10-13 RX ORDER — IBUPROFEN 200 MG
200 TABLET ORAL EVERY 6 HOURS PRN
COMMUNITY

## 2023-10-17 ENCOUNTER — TELEPHONE (OUTPATIENT)
Dept: PHYSICAL THERAPY | Facility: HOSPITAL | Age: 78
End: 2023-10-17

## 2023-10-17 ENCOUNTER — TELEPHONE (OUTPATIENT)
Dept: ORTHOPEDICS CLINIC | Facility: CLINIC | Age: 78
End: 2023-10-17

## 2023-10-17 DIAGNOSIS — M25.561 RIGHT KNEE PAIN, UNSPECIFIED CHRONICITY: Primary | ICD-10-CM

## 2023-10-17 DIAGNOSIS — Z01.89 ENCOUNTER FOR LOWER EXTREMITY COMPARISON IMAGING STUDY: ICD-10-CM

## 2023-10-17 NOTE — TELEPHONE ENCOUNTER
XR ordered per ortho protocol. XR scheduled and patient was notified via "[employer]"hart to let them know that they should arrive 15-20 minutes early, in order for them to complete imaging.

## 2023-10-18 ENCOUNTER — OFFICE VISIT (OUTPATIENT)
Dept: ORTHOPEDICS CLINIC | Facility: CLINIC | Age: 78
End: 2023-10-18

## 2023-10-18 ENCOUNTER — HOSPITAL ENCOUNTER (OUTPATIENT)
Dept: GENERAL RADIOLOGY | Age: 78
Discharge: HOME | End: 2023-10-18
Attending: ORTHOPAEDIC SURGERY
Payer: MEDICARE

## 2023-10-18 ENCOUNTER — TELEPHONE (OUTPATIENT)
Dept: PHYSICAL THERAPY | Age: 78
End: 2023-10-18

## 2023-10-18 VITALS — BODY MASS INDEX: 34.49 KG/M2 | HEIGHT: 64 IN | WEIGHT: 202 LBS

## 2023-10-18 DIAGNOSIS — M17.0 PRIMARY OSTEOARTHRITIS OF BOTH KNEES: Primary | ICD-10-CM

## 2023-10-18 DIAGNOSIS — Z01.89 ENCOUNTER FOR LOWER EXTREMITY COMPARISON IMAGING STUDY: ICD-10-CM

## 2023-10-18 DIAGNOSIS — M25.561 RIGHT KNEE PAIN, UNSPECIFIED CHRONICITY: ICD-10-CM

## 2023-10-18 PROCEDURE — 73562 X-RAY EXAM OF KNEE 3: CPT | Performed by: ORTHOPAEDIC SURGERY

## 2023-10-18 PROCEDURE — 73564 X-RAY EXAM KNEE 4 OR MORE: CPT | Performed by: ORTHOPAEDIC SURGERY

## 2023-10-18 RX ORDER — TRIAMCINOLONE ACETONIDE 40 MG/ML
40 INJECTION, SUSPENSION INTRA-ARTICULAR; INTRAMUSCULAR ONCE
Status: COMPLETED | OUTPATIENT
Start: 2023-10-18 | End: 2023-10-18

## 2023-10-18 RX ADMIN — TRIAMCINOLONE ACETONIDE 40 MG: 40 INJECTION, SUSPENSION INTRA-ARTICULAR; INTRAMUSCULAR at 15:11:00

## 2023-10-19 ENCOUNTER — LAB ENCOUNTER (OUTPATIENT)
Dept: LAB | Age: 78
End: 2023-10-19
Attending: INTERNAL MEDICINE
Payer: MEDICARE

## 2023-10-19 ENCOUNTER — APPOINTMENT (OUTPATIENT)
Dept: PHYSICAL THERAPY | Age: 78
End: 2023-10-19
Attending: NURSE PRACTITIONER
Payer: MEDICARE

## 2023-10-19 DIAGNOSIS — E03.9 ACQUIRED HYPOTHYROIDISM: ICD-10-CM

## 2023-10-19 DIAGNOSIS — E78.5 DYSLIPIDEMIA: ICD-10-CM

## 2023-10-19 DIAGNOSIS — R73.03 PRE-DIABETES: ICD-10-CM

## 2023-10-19 LAB
ALBUMIN SERPL-MCNC: 3.3 G/DL (ref 3.4–5)
ALBUMIN/GLOB SERPL: 0.9 {RATIO} (ref 1–2)
ALP LIVER SERPL-CCNC: 102 U/L
ALT SERPL-CCNC: 23 U/L
ANION GAP SERPL CALC-SCNC: 4 MMOL/L (ref 0–18)
AST SERPL-CCNC: 12 U/L (ref 15–37)
BILIRUB SERPL-MCNC: 0.4 MG/DL (ref 0.1–2)
BUN BLD-MCNC: 24 MG/DL (ref 7–18)
CALCIUM BLD-MCNC: 8.8 MG/DL (ref 8.5–10.1)
CHLORIDE SERPL-SCNC: 110 MMOL/L (ref 98–112)
CHOLEST SERPL-MCNC: 164 MG/DL (ref ?–200)
CO2 SERPL-SCNC: 27 MMOL/L (ref 21–32)
CREAT BLD-MCNC: 0.95 MG/DL
EGFRCR SERPLBLD CKD-EPI 2021: 61 ML/MIN/1.73M2 (ref 60–?)
EST. AVERAGE GLUCOSE BLD GHB EST-MCNC: 140 MG/DL (ref 68–126)
FASTING PATIENT LIPID ANSWER: YES
FASTING STATUS PATIENT QL REPORTED: YES
GLOBULIN PLAS-MCNC: 3.6 G/DL (ref 2.8–4.4)
GLUCOSE BLD-MCNC: 164 MG/DL (ref 70–99)
HBA1C MFR BLD: 6.5 % (ref ?–5.7)
HDLC SERPL-MCNC: 72 MG/DL (ref 40–59)
LDLC SERPL CALC-MCNC: 81 MG/DL (ref ?–100)
NONHDLC SERPL-MCNC: 92 MG/DL (ref ?–130)
OSMOLALITY SERPL CALC.SUM OF ELEC: 300 MOSM/KG (ref 275–295)
POTASSIUM SERPL-SCNC: 4.3 MMOL/L (ref 3.5–5.1)
PROT SERPL-MCNC: 6.9 G/DL (ref 6.4–8.2)
SODIUM SERPL-SCNC: 141 MMOL/L (ref 136–145)
TRIGL SERPL-MCNC: 53 MG/DL (ref 30–149)
TSI SER-ACNC: 0.67 MIU/ML (ref 0.36–3.74)
VLDLC SERPL CALC-MCNC: 8 MG/DL (ref 0–30)

## 2023-10-19 PROCEDURE — 80061 LIPID PANEL: CPT

## 2023-10-19 PROCEDURE — 84443 ASSAY THYROID STIM HORMONE: CPT

## 2023-10-19 PROCEDURE — 80053 COMPREHEN METABOLIC PANEL: CPT

## 2023-10-19 PROCEDURE — 36415 COLL VENOUS BLD VENIPUNCTURE: CPT

## 2023-10-19 PROCEDURE — 83036 HEMOGLOBIN GLYCOSYLATED A1C: CPT

## 2023-10-20 DIAGNOSIS — R73.03 PRE-DIABETES: ICD-10-CM

## 2023-10-20 DIAGNOSIS — I77.9 BILATERAL CAROTID ARTERY DISEASE, UNSPECIFIED TYPE (HCC): ICD-10-CM

## 2023-10-20 DIAGNOSIS — E03.9 ACQUIRED HYPOTHYROIDISM: Primary | ICD-10-CM

## 2023-10-20 DIAGNOSIS — Z00.00 ENCOUNTER FOR ANNUAL WELLNESS EXAM IN MEDICARE PATIENT: ICD-10-CM

## 2023-10-20 DIAGNOSIS — I65.23 BILATERAL CAROTID ARTERY STENOSIS: ICD-10-CM

## 2023-10-24 ENCOUNTER — APPOINTMENT (OUTPATIENT)
Dept: PHYSICAL THERAPY | Age: 78
End: 2023-10-24
Attending: NURSE PRACTITIONER
Payer: MEDICARE

## 2023-10-26 ENCOUNTER — APPOINTMENT (OUTPATIENT)
Dept: PHYSICAL THERAPY | Age: 78
End: 2023-10-26
Attending: NURSE PRACTITIONER
Payer: MEDICARE

## 2023-10-30 ENCOUNTER — APPOINTMENT (OUTPATIENT)
Dept: PHYSICAL THERAPY | Age: 78
End: 2023-10-30
Attending: NURSE PRACTITIONER
Payer: MEDICARE

## 2023-11-02 ENCOUNTER — APPOINTMENT (OUTPATIENT)
Dept: PHYSICAL THERAPY | Age: 78
End: 2023-11-02
Attending: NURSE PRACTITIONER
Payer: MEDICARE

## 2023-11-25 DIAGNOSIS — I10 ESSENTIAL HYPERTENSION: ICD-10-CM

## 2023-11-27 RX ORDER — LISINOPRIL 5 MG/1
5 TABLET ORAL DAILY
Qty: 90 TABLET | Refills: 1 | Status: SHIPPED | OUTPATIENT
Start: 2023-11-27

## 2023-11-27 NOTE — TELEPHONE ENCOUNTER
Hypertension Medications Protocol Lxhkdt1011/25/2023 11:52 AM   Protocol Details CMP or BMP in past 12 months    Last serum creatinine< 2.0    Appointment in past 6 or next 3 months      2. Essential hypertension  Continue meds.    Future Appointments   Date Time Provider Livia Arreola   4/16/2024 10:20 AM Tamara Parr MD [facility] Tina Pettit

## 2023-12-20 DIAGNOSIS — I10 ESSENTIAL HYPERTENSION: ICD-10-CM

## 2023-12-20 NOTE — TELEPHONE ENCOUNTER
Hypertension Medications Protocol Lgettb3512/20/2023 11:06 AM   Protocol Details CMP or BMP in past 12 months    Last serum creatinine< 2.0    Appointment in past 6 or next 3 months      2. Essential hypertension  Continue meds.    Future Appointments   Date Time Provider Livia Arreola   4/16/2024 10:20 AM Elbert Cherry MD [facility]

## 2023-12-20 NOTE — TELEPHONE ENCOUNTER
Is this medication prescribed by the [facility] Providers? yes    Did the patient contact the pharmacy directly?:  yes    Is patient out of meds or supply very low?:  1 pill left    Medication Requested:  METOPROLOL     Dose:  25mg    Is patient requesting a 30 or 90 day supply?:  90    Pharmacy name and phone # or location:  [id number] - [address] [address], [phone], [phone]     Is the patient due for an appointment?: no  (if so, please schedule appt)    Additional Notes:  1 pill left    Please advise the patient refills take up to 72 business hours.

## 2024-01-10 DIAGNOSIS — E78.5 DYSLIPIDEMIA: ICD-10-CM

## 2024-01-12 RX ORDER — ATORVASTATIN CALCIUM 40 MG/1
TABLET, FILM COATED ORAL
Qty: 90 TABLET | Refills: 0 | Status: SHIPPED | OUTPATIENT
Start: 2024-01-12

## 2024-01-13 DIAGNOSIS — E03.9 ACQUIRED HYPOTHYROIDISM: ICD-10-CM

## 2024-01-15 RX ORDER — LEVOTHYROXINE SODIUM 0.05 MG/1
50 TABLET ORAL
Qty: 90 TABLET | Refills: 2 | Status: SHIPPED | OUTPATIENT
Start: 2024-01-15

## 2024-01-17 ENCOUNTER — HOSPITAL ENCOUNTER (OUTPATIENT)
Dept: MAMMOGRAPHY | Age: 79
Discharge: HOME | End: 2024-01-17
Attending: INTERNAL MEDICINE
Payer: MEDICARE

## 2024-01-17 ENCOUNTER — HOSPITAL ENCOUNTER (OUTPATIENT)
Dept: BONE DENSITY | Age: 79
Discharge: HOME | End: 2024-01-17
Attending: INTERNAL MEDICINE
Payer: MEDICARE

## 2024-01-17 DIAGNOSIS — Z12.31 ENCOUNTER FOR SCREENING MAMMOGRAM FOR MALIGNANT NEOPLASM OF BREAST: ICD-10-CM

## 2024-01-17 DIAGNOSIS — Z78.0 POSTMENOPAUSAL: ICD-10-CM

## 2024-01-17 PROCEDURE — 77080 DXA BONE DENSITY AXIAL: CPT | Performed by: INTERNAL MEDICINE

## 2024-01-17 PROCEDURE — 77063 BREAST TOMOSYNTHESIS BI: CPT | Performed by: INTERNAL MEDICINE

## 2024-01-17 PROCEDURE — 77067 SCR MAMMO BI INCL CAD: CPT | Performed by: INTERNAL MEDICINE

## 2024-04-11 ENCOUNTER — LAB ENCOUNTER (OUTPATIENT)
Dept: LAB | Age: 79
End: 2024-04-11
Attending: INTERNAL MEDICINE
Payer: MEDICARE

## 2024-04-11 DIAGNOSIS — I77.9 BILATERAL CAROTID ARTERY DISEASE, UNSPECIFIED TYPE (HCC): ICD-10-CM

## 2024-04-11 DIAGNOSIS — E03.9 ACQUIRED HYPOTHYROIDISM: ICD-10-CM

## 2024-04-11 DIAGNOSIS — R73.03 PRE-DIABETES: ICD-10-CM

## 2024-04-11 DIAGNOSIS — E78.5 DYSLIPIDEMIA: ICD-10-CM

## 2024-04-11 DIAGNOSIS — I65.23 BILATERAL CAROTID ARTERY STENOSIS: ICD-10-CM

## 2024-04-11 DIAGNOSIS — Z00.00 ENCOUNTER FOR ANNUAL WELLNESS EXAM IN MEDICARE PATIENT: ICD-10-CM

## 2024-04-11 LAB
EST. AVERAGE GLUCOSE BLD GHB EST-MCNC: 143 MG/DL (ref 68–126)
HBA1C MFR BLD: 6.6 % (ref ?–5.7)

## 2024-04-11 PROCEDURE — 84443 ASSAY THYROID STIM HORMONE: CPT

## 2024-04-11 PROCEDURE — 80053 COMPREHEN METABOLIC PANEL: CPT

## 2024-04-11 PROCEDURE — 36415 COLL VENOUS BLD VENIPUNCTURE: CPT

## 2024-04-11 PROCEDURE — 80061 LIPID PANEL: CPT

## 2024-04-11 PROCEDURE — 83036 HEMOGLOBIN GLYCOSYLATED A1C: CPT

## 2024-04-11 RX ORDER — ATORVASTATIN CALCIUM 40 MG/1
TABLET, FILM COATED ORAL
Qty: 90 TABLET | Refills: 0 | Status: SHIPPED | OUTPATIENT
Start: 2024-04-11

## 2024-04-12 LAB
ALBUMIN SERPL-MCNC: 3.4 G/DL (ref 3.4–5)
ALBUMIN/GLOB SERPL: 1.1 {RATIO} (ref 1–2)
ALP LIVER SERPL-CCNC: 91 U/L
ALT SERPL-CCNC: 19 U/L
ANION GAP SERPL CALC-SCNC: 4 MMOL/L (ref 0–18)
AST SERPL-CCNC: 21 U/L (ref 15–37)
BILIRUB SERPL-MCNC: 0.6 MG/DL (ref 0.1–2)
BUN BLD-MCNC: 26 MG/DL (ref 9–23)
CALCIUM BLD-MCNC: 9.1 MG/DL (ref 8.5–10.1)
CHLORIDE SERPL-SCNC: 108 MMOL/L (ref 98–112)
CHOLEST SERPL-MCNC: 157 MG/DL (ref ?–200)
CO2 SERPL-SCNC: 28 MMOL/L (ref 21–32)
CREAT BLD-MCNC: 1.03 MG/DL
EGFRCR SERPLBLD CKD-EPI 2021: 55 ML/MIN/1.73M2 (ref 60–?)
FASTING PATIENT LIPID ANSWER: YES
FASTING STATUS PATIENT QL REPORTED: YES
GLOBULIN PLAS-MCNC: 3.2 G/DL (ref 2.8–4.4)
GLUCOSE BLD-MCNC: 105 MG/DL (ref 70–99)
HDLC SERPL-MCNC: 62 MG/DL (ref 40–59)
LDLC SERPL CALC-MCNC: 82 MG/DL (ref ?–100)
NONHDLC SERPL-MCNC: 95 MG/DL (ref ?–130)
OSMOLALITY SERPL CALC.SUM OF ELEC: 295 MOSM/KG (ref 275–295)
POTASSIUM SERPL-SCNC: 4.2 MMOL/L (ref 3.5–5.1)
PROT SERPL-MCNC: 6.6 G/DL (ref 6.4–8.2)
SODIUM SERPL-SCNC: 140 MMOL/L (ref 136–145)
TRIGL SERPL-MCNC: 66 MG/DL (ref 30–149)
TSI SER-ACNC: 2.9 MIU/ML (ref 0.36–3.74)
VLDLC SERPL CALC-MCNC: 10 MG/DL (ref 0–30)

## 2024-04-15 NOTE — PROGRESS NOTES
Subjective:   Monica Nieto is a 79 year old female who presents for a MA (Medicare Advantage) Supervisit (Once per calendar year) and med check.     Mammo done 1/2024.   Colonoscopy done 2021, repeat in 10 years.   Dexa done 1/2024 showed osteopenia, frax 11% and 2.3%, error in dexa report that said 23%    Up to date tdap.   Due prevnar 20. Done today.  Up to date covid booster.   Due shingrix, rsv. Get at her local pharmacy.     [facility] flowsheet reviewed.   Had a fall, tripped on the stairs. Mechanical fall. Fall precautions discussed.   Memory testing normal.   Mood has been stable. No depression.   Seeing eye doctor yearly.      Had a cold a month ago.   Still with a bit of a cough.   Scratchy throat seems to be coming back.   Crackling in the right ear sometimes.     A1c 6.6, this is her second A1c higher than 6.5.   Has a strong family history of dm.   She has been trying to watch her diet.     History/Other:   Fall Risk Assessment:   She has been screened for Falls and is High Risk. Fall Prevention information provided to patient in After Visit Summary.    Do you feel unsteady when standing or walking?: No  Do you worry about falling?: No  Have you fallen in the past year?: Yes  How many times have you fallen?: 1  Were you injured?: No     Cognitive Assessment:   She had a completely normal cognitive assessment - see flowsheet entries       Functional Ability/Status:   Monica Nieto has a completely normal functional assessment. See flowsheet for details.      Depression Screening (PHQ-2/PHQ-9): PHQ-2 SCORE: 0  , done 4/16/2024   Last [place] Suicide Screening on 4/16/2024 was No Risk.         Advanced Directives:   She does have a Living Will but we do NOT have it on file in Epic.    She does have a POA but we do NOT have it on file in Epic.    Discussed with patient and provided information.        Patient Active Problem List   Diagnosis    Hypothyroidism    Dyslipidemia    Essential hypertension    Carotid  disease, bilateral (HCC)    SVT (supraventricular tachycardia) (HCC)    Atherosclerosis of aorta (HCC)    Tortuous aorta (HCC)    History of stroke    Cervical spondylosis    Lumbar spondylosis    Type 2 diabetes mellitus with hyperglycemia, without long-term current use of insulin (HCC)    CKD (chronic kidney disease) stage 3, GFR 30-59 ml/min (HCC)    CKD stage 3 due to type 2 diabetes mellitus (HCC)     Allergies:  She is allergic to cholestatin, cyclobenzaprine, and seasonal.    Current Medications:  Outpatient Medications Marked as Taking for the 4/16/24 encounter (Office Visit) with Rosette Ivy MD   Medication Sig    ATORVASTATIN 40 MG Oral Tab TAKE 1 TABLET(40 MG) BY MOUTH EVERY NIGHT    levothyroxine 50 MCG Oral Tab Take 1 tablet (50 mcg total) by mouth before breakfast.    metoprolol tartrate 25 MG Oral Tab TAKE 1/2 TABLET(12.5 MG) BY MOUTH TWICE DAILY    lisinopril 5 MG Oral Tab TAKE 1 TABLET(5 MG) BY MOUTH DAILY    ibuprofen 200 MG Oral Tab Take 1 tablet (200 mg total) by mouth every 6 (six) hours as needed for Pain.    acetaminophen-codeine (TYLENOL WITH CODEINE #3) 300-30 MG Oral Tab Take 1 tablet by mouth every 6 (six) hours as needed for Pain.    diclofenac 1 % External Gel Apply 2 g topically 2 (two) times daily as needed.    Naproxen Sodium 220 MG Oral Cap Take 440 mg by mouth every 6 (six) hours as needed.    aspirin 325 MG Oral Tab Take 1 tablet (325 mg total) by mouth daily.       Medical History:  She  has a past medical history of Abnormal Pap smear of cervix (11/24/2015), Acute blood loss anemia (3/18/2022), Acute, but ill-defined, cerebrovascular disease (11/2015), Allergic rhinitis (3 yrs), Arrhythmia, Arthritis, Back problem, Calculus of kidney (4/2016), Cancer ([facility]) (2005), Cataract, Cervical spondylosis (10/31/2019), KATIAAN I (cervical intraepithelial neoplasia I) (11/24/2015), Complex endometrial hyperplasia with atypia (3/18/2022), Disorder of thyroid, Esophageal reflux, Essential  hypertension (2015), Hemorrhoids (Internal), High blood pressure, High cholesterol (), motion sickness, Hyperlipidemia, Hyperthyroidism (09), Hypothyroidism (2013), Irregular bowel habits, Nail fungus (2009),  (normal spontaneous vaginal delivery) ([facility]) (), OAB (overactive bladder) (2014), Obesity, Osteoarthritis (2010), Peripheral vascular disease ([facility]), PMB (postmenopausal bleeding), Stroke, lacunar ([facility]) (2015), Stroke, lacunar ([facility]) (2021), and Visual impairment.  Surgical History:  She  has a past surgical history that includes leep; cataract (); colonoscopy (N/A, 2021); benign biopsy right; dilation/curettage,diagnostic (2022); colonoscopy (); Total abdominal hysterectomy (2022); oophorectomy (Bilateral, 2022); salpingectomy (Bilateral, 2022); d & c; hysterectomy;  (); and needle biopsy right.   Family History:  Her family history includes Breast Cancer (age of onset: 57) in her niece; CABG in her brother; CHF in her mother; CVA in her brother; DM, in her mother; Diabetes in her sister and son; HTN,hypercholesterolemia in an other family member; Heart Disorder in her mother; MI, in an other family member; TB in her maternal grandmother; arthritis in her mother; bladder cancer (age of onset: 79) in her brother.  Social History:  She  reports that she quit smoking about 49 years ago. Her smoking use included cigarettes. She started smoking about 59 years ago. She has a 2.5 pack-year smoking history. She has never used smokeless tobacco. She reports current alcohol use of about 2.0 standard drinks of alcohol per week. She reports that she does not use drugs.    Tobacco:  She smoked tobacco in the past but quit greater than 12 months ago.  Social History     Tobacco Use   Smoking Status Former    Current packs/day: 0.00    Average packs/day: 0.3 packs/day for 10.0 years (2.5 ttl pk-yrs)    Types: Cigarettes    Start  date: 1/10/1965    Quit date: 1/10/1975    Years since quittin.2   Smokeless Tobacco Never   Tobacco Comments    quit age 28        CAGE Alcohol Screen:   CAGE screening score of 0 on 2024, showing low risk of alcohol abuse.      Patient Care Team:  Rosette Ivy MD as PCP - General  Aneta Ni (OPHTHALMOLOGY)  Teo Mccoy MD as Consulting Physician (Cardiac Electrophysiology)  Attila Contreras MD (OBSTETRICS & GYNECOLOGY)    Review of Systems  GENERAL: feels well otherwise  SKIN: denies any unusual skin lesions  EYES:denies blurred vision or double vision  HEENT: denies nasal congestion, sinus pain or ST  LUNGS: denies shortness of breath with exertion  CARDIOVASCULAR: denies chest pain on exertion  GI: denies abdominal pain,denies heartburn  : denies dysuria, vaginal discharge or itching  MUSCULOSKELETAL: denies back pain  NEURO: denies headaches  PSYCHE: denies depression or anxiety  HEMATOLOGIC: denies hx of anemia  ENDOCRINE: denies thyroid history  ALL/ASTHMA: has allergies     Objective:   Physical Exam  General Appearance:  Alert, cooperative, no distress, appears stated age   Head:  Normocephalic, without obvious abnormality, atraumatic   Eyes:  PERRL, conjunctiva/corneas clear, EOM's intact both eyes   Ears:  Cerumen in both ears, dry.    Nose: Nares normal, septum midline,mucosa normal, no drainage or sinus tenderness   Throat: Lips, mucosa, and tongue normal; teeth and gums normal   Neck: Supple, symmetrical, trachea midline, no adenopathy;  thyroid: not enlarged,  no carotid bruit or JVD   Back:   Symmetric, no curvature, ROM normal, no CVA tenderness   Lungs:   Clear to auscultation bilaterally, respirations unlabored   Heart:  Regular rate and rhythm, S1 and S2 normal, no murmur, rub, or gallop   Abdomen:   Soft, non-tender, bowel sounds active all four quadrants,  no masses, no organomegaly   Pelvic: Deferred   Extremities: Extremities normal, atraumatic, no cyanosis or  edema   Pulses: 2+ and symmetric   Skin: Skin color, texture, turgor normal, no rashes or lesions   Lymph nodes: Cervical, supraclavicular, and axillary nodes normal   Neurologic: Normal   Bilateral barefoot skin diabetic exam is normal, visualized feet and the appearance is normal.  Bilateral monofilament/sensation of both feet is normal.  Pulsation pedal pulse exam of both lower legs/feet is normal as well.       /70 (BP Location: Left arm, Patient Position: Sitting, Cuff Size: large)   Pulse 64   Temp 97.9 °F (36.6 °C) (Temporal)   Resp 16   Ht 5' 4.5\" (1.638 m)   Wt 202 lb 1.6 oz (91.7 kg)   Breastfeeding No   BMI 34.15 kg/m²  Estimated body mass index is 34.15 kg/m² as calculated from the following:    Height as of this encounter: 5' 4.5\" (1.638 m).    Weight as of this encounter: 202 lb 1.6 oz (91.7 kg).    Medicare Hearing Assessment:   Hearing Screening    Screening Method: Finger Rub  Finger Rub Result: Pass               Assessment & Plan:   Monica Nieto is a 79 year old female who presents for a Medicare Assessment.     1. Encounter for annual health examination  Mammo done 1/2024.   Colonoscopy done 2021, repeat in 10 years.   Dexa done 1/2024 showed osteopenia, frax 11% and 2.3%, error in dexa report that said 23%    Up to date tdap.   Due prevnar 20. Done today.  Up to date covid booster.   Due shingrix, rsv. Get at her local pharmacy.     [facility] flowsheet reviewed.   Had a fall, tripped on the stairs. Mechanical fall. Fall precautions discussed.   Memory testing normal.   Mood has been stable. No depression.   Seeing eye doctor yearly.      2. Need for vaccination  - Prevnar 20 (PCV20) [39767]    3. Essential hypertension  Continue meds.     4. Dyslipidemia  Continue statin.   - CMP in 6 months; Future  - Lipid in 6 months; Future    5. Atherosclerosis of aorta (HCC)  6. Bilateral carotid artery disease, unspecified type (HCC)  7. Tortuous aorta (HCC)  Continue statin.     8. SVT  (supraventricular tachycardia) (HCC)  Continue metoprolol.     9. Type 2 diabetes mellitus with hyperglycemia, without long-term current use of insulin (HCC)  New diagnosis.   Recommend metformin. She declines.   Continue lifestyle modification.   Given eye exam form.   Foot exam done today.   - Hemoglobin A1C in 6 months; Future  - Microalb/Creat Ratio, Random Urine in 6 months; Future    10. Acquired hypothyroidism  Continue levothyroxine.     11. Lumbar spondylosis  12. Cervical spondylosis  Stable. Continue current management.      13. History of stroke  Stable. Continue current management.      14. Subacute cough  Sec to allergies and post viral.   Not severe.   Continue conservative management.   Trial claritin in am and benadryl in pm for post nasal drip.   If persists or worsens rtc.     15. Impacted cerumen, bilateral  Use debrox three times per week for 2 weeks.   Make nurse visit to have ears irrigated in 2 weeks.     16. Ckd 3  Ckd 3 sec to dm  Monitor. Stay hydrated.   Will check microalbumin at next labs.       The patient indicates understanding of these issues and agrees to the plan.  Reinforced healthy diet, lifestyle, and exercise.      Return in about 6 months (around 10/16/2024) for med check.     Rosette Ivy MD, 4/14/2024     Supplementary Documentation:   General Health:  In the past six months, have you lost more than 10 pounds without trying?: 2 - No  Has your appetite been poor?: No  Type of Diet: Balanced;Low Salt;Low Carb  How does the patient maintain a good energy level?: Appropriate Exercise;Daily Walks;Other  How would you describe your daily physical activity?: Moderate  How would you describe your current health state?: Good  How do you maintain positive mental well-being?: Social Interaction;Visiting Friends;Visiting Family  On a scale of 0 to 10, with 0 being no pain and 10 being severe pain, what is your pain level?: 4 - (Moderate) (both knees)  In the past six months, have you  experienced urine leakage?: 1-Yes  At any time do you feel concerned for the safety/well-being of yourself and/or your children, in your home or elsewhere?: No  Have you had any immunizations at another office such as Influenza, Hepatitis B, Tetanus, or Pneumococcal?: No       Monica Nieto's SCREENING SCHEDULE   Tests on this list are recommended by your physician but may not be covered, or covered at this frequency, by your insurer.   Please check with your insurance carrier before scheduling to verify coverage.   PREVENTATIVE SERVICES FREQUENCY &  COVERAGE DETAILS LAST COMPLETION DATE   Diabetes Screening    Fasting Blood Sugar /  Glucose    One screening every 12 months if never tested or if previously tested but not diagnosed with pre-diabetes   One screening every 6 months if diagnosed with pre-diabetes Lab Results   Component Value Date     (H) 04/11/2024        Cardiovascular Disease Screening    Lipid Panel  Cholesterol  Lipoprotein (HDL)  Triglycerides Covered every 5 years for all Medicare beneficiaries without apparent signs or symptoms of cardiovascular disease Lab Results   Component Value Date    CHOLEST 157 04/11/2024    HDL 62 (H) 04/11/2024    LDL 82 04/11/2024    TRIG 66 04/11/2024         Electrocardiogram (EKG)   Covered if needed at Welcome to Medicare, and non-screening if indicated for medical reasons 03/24/2022      Ultrasound Screening for Abdominal Aortic Aneurysm (AAA) Covered once in a lifetime for one of the following risk factors    Men who are 65-75 years old and have ever smoked    Anyone with a family history -     Colorectal Cancer Screening  Covered for ages 50-85; only need ONE of the following:    Colonoscopy   Covered every 10 years    Covered every 2 years if patient is at high risk or previous colonoscopy was abnormal 09/07/2021    Health Maintenance   Topic Date Due    Colorectal Cancer Screening  09/07/2031       Flexible Sigmoidoscopy   Covered every 4 years -     Fecal Occult Blood Test Covered annually -   Bone Density Screening    Bone density screening    Covered every 2 years after age 65 if diagnosed with risk of osteoporosis or estrogen deficiency.    Covered yearly for long-term glucocorticoid medication use (Steroids) Last Dexa Scan:    XR DEXA BONE DENSITOMETRY (CPT=77080) 01/17/2024      No recommendations at this time   Pap and Pelvic    Pap   Covered every 2 years for women at normal risk; Annually if at high risk -  No recommendations at this time    Chlamydia Annually if high risk -  No recommendations at this time   Screening Mammogram    Mammogram     Recommend annually for all female patients aged 40 and older    One baseline mammogram covered for patients aged 35-39 01/17/2024    Health Maintenance   Topic Date Due    Mammogram  01/17/2025       Immunizations    Influenza Covered once per flu season  Please get every year 10/13/2023  No recommendations at this time    Pneumococcal Each vaccine ([id number] & [id number]) covered once after 65 Prevnar 13: 08/05/2016    [id number]: 09/01/2008     No recommendations at this time    Hepatitis B One screening covered for patients with certain risk factors   -  No recommendations at this time    Tetanus Toxoid Not covered by Medicare Part B unless medically necessary (cut with metal); may be covered with your pharmacy prescription benefits -    Tetanus, Diptheria and Pertusis TD and TDaP Not covered by Medicare Part B -  No recommendations at this time    Zoster Not covered by Medicare Part B; may be covered with your pharmacy  prescription benefits 08/27/2009  Zoster Vaccines(2 of 3) due on 10/22/2009     Diabetes      Hemoglobin A1C Annually; if last result is elevated, may be asked to retest more frequently.    Medicare covers every 3 months Lab Results   Component Value Date     (H) 04/11/2024    A1C 6.6 (H) 04/11/2024       No recommendations at this time    Creat/alb ratio Annually No results found  for: \"MICROALBCREA\", \"MALBCRECALC\"    LDL Annually Lab Results   Component Value Date    LDL 82 04/11/2024       Dilated Eye Exam Annually Last Diabetic Eye Exam:  No data recorded  No data recorded       Annual Monitoring of Persistent Medications (ACE/ARB, digoxin diuretics, anticonvulsants)    Potassium Annually Lab Results   Component Value Date    K 4.2 04/11/2024         Creatinine   Annually Lab Results   Component Value Date    CREATSERUM 1.03 (H) 04/11/2024         BUN Annually Lab Results   Component Value Date    BUN 26 (H) 04/11/2024       Drug Serum Conc Annually No results found for: \"DIGOXIN\", \"DIG\", \"VALP\"

## 2024-04-16 ENCOUNTER — OFFICE VISIT (OUTPATIENT)
Dept: INTERNAL MEDICINE CLINIC | Facility: CLINIC | Age: 79
End: 2024-04-16
Payer: MEDICARE

## 2024-04-16 ENCOUNTER — TELEPHONE (OUTPATIENT)
Dept: INTERNAL MEDICINE CLINIC | Facility: CLINIC | Age: 79
End: 2024-04-16

## 2024-04-16 VITALS
SYSTOLIC BLOOD PRESSURE: 132 MMHG | BODY MASS INDEX: 34.09 KG/M2 | RESPIRATION RATE: 16 BRPM | WEIGHT: 202.13 LBS | DIASTOLIC BLOOD PRESSURE: 70 MMHG | HEIGHT: 64.5 IN | TEMPERATURE: 98 F | HEART RATE: 64 BPM

## 2024-04-16 DIAGNOSIS — I77.1 TORTUOUS AORTA (HCC): ICD-10-CM

## 2024-04-16 DIAGNOSIS — N18.31 STAGE 3A CHRONIC KIDNEY DISEASE (HCC): Chronic | ICD-10-CM

## 2024-04-16 DIAGNOSIS — Z00.00 ENCOUNTER FOR ANNUAL HEALTH EXAMINATION: ICD-10-CM

## 2024-04-16 DIAGNOSIS — I10 ESSENTIAL HYPERTENSION: ICD-10-CM

## 2024-04-16 DIAGNOSIS — M47.816 LUMBAR SPONDYLOSIS: ICD-10-CM

## 2024-04-16 DIAGNOSIS — Z86.73 HISTORY OF STROKE: ICD-10-CM

## 2024-04-16 DIAGNOSIS — H61.23 IMPACTED CERUMEN, BILATERAL: ICD-10-CM

## 2024-04-16 DIAGNOSIS — E78.5 DYSLIPIDEMIA: ICD-10-CM

## 2024-04-16 DIAGNOSIS — I77.9 BILATERAL CAROTID ARTERY DISEASE, UNSPECIFIED TYPE (HCC): ICD-10-CM

## 2024-04-16 DIAGNOSIS — E03.9 ACQUIRED HYPOTHYROIDISM: ICD-10-CM

## 2024-04-16 DIAGNOSIS — Z23 NEED FOR VACCINATION: ICD-10-CM

## 2024-04-16 DIAGNOSIS — E11.65 TYPE 2 DIABETES MELLITUS WITH HYPERGLYCEMIA, WITHOUT LONG-TERM CURRENT USE OF INSULIN (HCC): ICD-10-CM

## 2024-04-16 DIAGNOSIS — I47.10 SVT (SUPRAVENTRICULAR TACHYCARDIA) (HCC): ICD-10-CM

## 2024-04-16 DIAGNOSIS — E11.22 CKD STAGE 3 DUE TO TYPE 2 DIABETES MELLITUS (HCC): Primary | Chronic | ICD-10-CM

## 2024-04-16 DIAGNOSIS — N18.30 CKD STAGE 3 DUE TO TYPE 2 DIABETES MELLITUS (HCC): Primary | Chronic | ICD-10-CM

## 2024-04-16 DIAGNOSIS — R05.2 SUBACUTE COUGH: ICD-10-CM

## 2024-04-16 DIAGNOSIS — I70.0 ATHEROSCLEROSIS OF AORTA (HCC): ICD-10-CM

## 2024-04-16 DIAGNOSIS — M47.812 CERVICAL SPONDYLOSIS: ICD-10-CM

## 2024-04-16 PROCEDURE — 99499 UNLISTED E&M SERVICE: CPT | Performed by: INTERNAL MEDICINE

## 2024-04-16 PROCEDURE — 99214 OFFICE O/P EST MOD 30 MIN: CPT | Performed by: INTERNAL MEDICINE

## 2024-04-16 PROCEDURE — 3078F DIAST BP <80 MM HG: CPT | Performed by: INTERNAL MEDICINE

## 2024-04-16 PROCEDURE — 1170F FXNL STATUS ASSESSED: CPT | Performed by: INTERNAL MEDICINE

## 2024-04-16 PROCEDURE — G0009 ADMIN PNEUMOCOCCAL VACCINE: HCPCS | Performed by: INTERNAL MEDICINE

## 2024-04-16 PROCEDURE — 96160 PT-FOCUSED HLTH RISK ASSMT: CPT | Performed by: INTERNAL MEDICINE

## 2024-04-16 PROCEDURE — 1125F AMNT PAIN NOTED PAIN PRSNT: CPT | Performed by: INTERNAL MEDICINE

## 2024-04-16 PROCEDURE — 3075F SYST BP GE 130 - 139MM HG: CPT | Performed by: INTERNAL MEDICINE

## 2024-04-16 PROCEDURE — 90677 PCV20 VACCINE IM: CPT | Performed by: INTERNAL MEDICINE

## 2024-04-16 PROCEDURE — G0439 PPPS, SUBSEQ VISIT: HCPCS | Performed by: INTERNAL MEDICINE

## 2024-04-16 PROCEDURE — 3008F BODY MASS INDEX DOCD: CPT | Performed by: INTERNAL MEDICINE

## 2024-04-16 PROCEDURE — 1159F MED LIST DOCD IN RCRD: CPT | Performed by: INTERNAL MEDICINE

## 2024-04-16 NOTE — PATIENT INSTRUCTIONS
Monica Nieto's SCREENING SCHEDULE   Tests on this list are recommended by your physician but may not be covered, or covered at this frequency, by your insurer.   Please check with your insurance carrier before scheduling to verify coverage.   PREVENTATIVE SERVICES FREQUENCY &  COVERAGE DETAILS LAST COMPLETION DATE   Diabetes Screening    Fasting Blood Sugar /  Glucose    One screening every 12 months if never tested or if previously tested but not diagnosed with pre-diabetes   One screening every 6 months if diagnosed with pre-diabetes Lab Results   Component Value Date     (H) 04/11/2024        Cardiovascular Disease Screening    Lipid Panel  Cholesterol  Lipoprotein (HDL)  Triglycerides Covered every 5 years for all Medicare beneficiaries without apparent signs or symptoms of cardiovascular disease Lab Results   Component Value Date    CHOLEST 157 04/11/2024    HDL 62 (H) 04/11/2024    LDL 82 04/11/2024    TRIG 66 04/11/2024         Electrocardiogram (EKG)   Covered if needed at Welcome to Medicare, and non-screening if indicated for medical reasons 03/24/2022      Ultrasound Screening for Abdominal Aortic Aneurysm (AAA) Covered once in a lifetime for one of the following risk factors   • Men who are 65-75 years old and have ever smoked   • Anyone with a family history -     Colorectal Cancer Screening  Covered for ages 50-85; only need ONE of the following:    Colonoscopy   Covered every 10 years    Covered every 2 years if patient is at high risk or previous colonoscopy was abnormal 09/07/2021    Health Maintenance   Topic Date Due   • Colorectal Cancer Screening  09/07/2031       Flexible Sigmoidoscopy   Covered every 4 years -    Fecal Occult Blood Test Covered annually -   Bone Density Screening    Bone density screening    Covered every 2 years after age 65 if diagnosed with risk of osteoporosis or estrogen deficiency.    Covered yearly for long-term glucocorticoid medication use (Steroids) Last Dexa  Scan:    XR DEXA BONE DENSITOMETRY (CPT=77080) 01/17/2024      No recommendations at this time   Pap and Pelvic    Pap   Covered every 2 years for women at normal risk; Annually if at high risk -  No recommendations at this time    Chlamydia Annually if high risk -  No recommendations at this time   Screening Mammogram    Mammogram     Recommend annually for all female patients aged 40 and older    One baseline mammogram covered for patients aged 35-39 01/17/2024    Health Maintenance   Topic Date Due   • Mammogram  01/17/2025       Immunizations    Influenza Covered once per flu season  Please get every year 10/13/2023  No recommendations at this time    Pneumococcal Each vaccine ([id number] & [id number]) covered once after 65 Prevnar 13: 08/05/2016    Redvtrzsl86: 09/01/2008     No recommendations at this time    Hepatitis B One screening covered for patients with certain risk factors   -  No recommendations at this time    Tetanus Toxoid Not covered by Medicare Part B unless medically necessary (cut with metal); may be covered with your pharmacy prescription benefits -    Tetanus, Diptheria and Pertusis TD and TDaP Not covered by Medicare Part B -  No recommendations at this time    Zoster Not covered by Medicare Part B; may be covered with your pharmacy  prescription benefits 08/27/2009  Zoster Vaccines(2 of 3) due on 10/22/2009     Annual Monitoring of Persistent Medications (ACE/ARB, digoxin diuretics, anticonvulsants)    Potassium Annually Lab Results   Component Value Date    K 4.2 04/11/2024         Creatinine   Annually Lab Results   Component Value Date    CREATSERUM 1.03 (H) 04/11/2024         BUN Annually Lab Results   Component Value Date    BUN 26 (H) 04/11/2024       Drug Serum Conc Annually No results found for: \"DIGOXIN\", \"DIG\", \"VALP\"

## 2024-04-16 NOTE — TELEPHONE ENCOUNTER
Dexa report states frax score hip fracture risk is 23%.   This is an error as the raw score on the dexa images is actually 2.3%. please have radiology make a correction in the report. Thanks.

## 2024-04-25 ENCOUNTER — OFFICE VISIT (OUTPATIENT)
Dept: ORTHOPEDICS CLINIC | Facility: CLINIC | Age: 79
End: 2024-04-25
Payer: MEDICARE

## 2024-04-25 VITALS — WEIGHT: 202 LBS | HEIGHT: 64 IN | BODY MASS INDEX: 34.49 KG/M2

## 2024-04-25 DIAGNOSIS — M17.0 PRIMARY OSTEOARTHRITIS OF BOTH KNEES: Primary | ICD-10-CM

## 2024-04-25 PROCEDURE — 1160F RVW MEDS BY RX/DR IN RCRD: CPT | Performed by: ORTHOPAEDIC SURGERY

## 2024-04-25 PROCEDURE — 1159F MED LIST DOCD IN RCRD: CPT | Performed by: ORTHOPAEDIC SURGERY

## 2024-04-25 PROCEDURE — 1125F AMNT PAIN NOTED PAIN PRSNT: CPT | Performed by: ORTHOPAEDIC SURGERY

## 2024-04-25 PROCEDURE — 20610 DRAIN/INJ JOINT/BURSA W/O US: CPT | Performed by: ORTHOPAEDIC SURGERY

## 2024-04-25 PROCEDURE — 99213 OFFICE O/P EST LOW 20 MIN: CPT | Performed by: ORTHOPAEDIC SURGERY

## 2024-04-25 PROCEDURE — 3008F BODY MASS INDEX DOCD: CPT | Performed by: ORTHOPAEDIC SURGERY

## 2024-04-25 RX ORDER — TRIAMCINOLONE ACETONIDE 40 MG/ML
40 INJECTION, SUSPENSION INTRA-ARTICULAR; INTRAMUSCULAR ONCE
Status: COMPLETED | OUTPATIENT
Start: 2024-04-25 | End: 2024-04-25

## 2024-04-25 RX ADMIN — TRIAMCINOLONE ACETONIDE 40 MG: 40 INJECTION, SUSPENSION INTRA-ARTICULAR; INTRAMUSCULAR at 15:07:00

## 2024-04-25 NOTE — PROGRESS NOTES
[facility] follow-up    Chief Complaint   Patient presents with    Follow - Up     CARLOS MANUEL KNEE PAIN  -would like cortisone injection carlos manuel knee     HPI: The patient is a 79 year old female returning for assessment of her knees.  She has struggled with chronic pain about the knees from osteoarthritis.  She relates favorable response to previous corticosteroid injections and wonders if this could be repeated.  She has not sustained any new injuries or taken on any increased activities.  She lives alone with her dog and is functioning reasonably well but is hopeful for symptomatic relief.  She has many friends who have undergone knee replacement surgery as well as viscosupplement injections and she feels that her symptoms are not so severe that she would consider surgery and that the cortisone is most effective.  Her last administration to both knees was 2023 with good results.    Past Medical History:    Abnormal Pap smear of cervix    Acute blood loss anemia    Acute, but ill-defined, cerebrovascular disease    Tia    Allergic rhinitis    Arrhythmia    Tachycardia    Arthritis    left shoulder- had xray    Back problem    Occas. pain    Calculus of kidney    Cancer (HCC)    Basel cell    Cataract    Cervical spondylosis    KATIANA I (cervical intraepithelial neoplasia I)    Actually KATIANA 1-2, , and KIRK 1    Complex endometrial hyperplasia with atypia    Disorder of thyroid    Esophageal reflux    Essential hypertension    Hemorrhoids    High blood pressure    High cholesterol    Hx of motion sickness    Hyperlipidemia    Hyperthyroidism    Hypothyroidism    Irregular bowel habits    Nail fungus    Onychomycosis Right foot, great toe     (normal spontaneous vaginal delivery) ([facility])    OAB (overactive bladder)    Obesity    Osteoarthritis    Left shoulder bursitis    Peripheral vascular disease (HCC)    PMB (postmenopausal bleeding)    Stroke, lacunar ([facility])    TIA & Lacunar Infarct - 2016 Mild  plaque/no stenosis carotid arteries    Stroke, lacunar (HCC)    Visual impairment    glasses     Past Surgical History:   Procedure Laterality Date    Benign biopsy right      Cataract      Both eyes    Colonoscopy N/A 2021    Procedure: COLONOSCOPY;  Surgeon: Jose F Randolph MD;  Location:  ENDOSCOPY    Colonoscopy  2008    D & c      Dilation/curettage,diagnostic  2022    Abnormal vaginal bleeding    Hysterectomy      Leep      Needle biopsy right      bening 20 years ago      1963/65    Oophorectomy Bilateral 2022    at time of hyst    Salpingectomy Bilateral 2022    at time of hyst     Total abdominal hysterectomy  2022    [facility], BSO - Dr. Millicent Edmonds      Current Outpatient Medications   Medication Sig Dispense Refill    ATORVASTATIN 40 MG Oral Tab TAKE 1 TABLET(40 MG) BY MOUTH EVERY NIGHT 90 tablet 0    levothyroxine 50 MCG Oral Tab Take 1 tablet (50 mcg total) by mouth before breakfast. 90 tablet 2    metoprolol tartrate 25 MG Oral Tab TAKE 1/2 TABLET(12.5 MG) BY MOUTH TWICE DAILY 90 tablet 1    lisinopril 5 MG Oral Tab TAKE 1 TABLET(5 MG) BY MOUTH DAILY 90 tablet 1    ibuprofen 200 MG Oral Tab Take 1 tablet (200 mg total) by mouth every 6 (six) hours as needed for Pain.      acetaminophen-codeine (TYLENOL WITH CODEINE #3) 300-30 MG Oral Tab Take 1 tablet by mouth every 6 (six) hours as needed for Pain. 30 tablet 0    diclofenac 1 % External Gel Apply 2 g topically 2 (two) times daily as needed. 50 g 0    Naproxen Sodium 220 MG Oral Cap Take 440 mg by mouth every 6 (six) hours as needed.      aspirin 325 MG Oral Tab Take 1 tablet (325 mg total) by mouth daily.       Allergies   Allergen Reactions    Cholestatin ITCHING and Runny nose    Cyclobenzaprine RASH     Flushing and chest rash which she gets from various meds and other triggers    Seasonal Runny nose and ITCHING     Family History   Problem Relation Age of Onset    Other (CHF [Other]) Mother     Other (DM,  [Other]) Mother     Other (arthritis [Other]) Mother     Heart Disorder Mother         Congestive heart failure    Diabetes Sister     Other (CABG [Other]) Brother     Other (CVA [Other]) Brother     Other (bladder cancer [Other]) Brother 79    Diabetes Son     Other (TB [Other]) Maternal Grandmother     Breast Cancer Niece 57    Other (MI, [Other]) Other     Other (HTN,hypercholesterolemia [Other]) Other      Social History     Occupational History    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   Tobacco Use    Smoking status: Former     Current packs/day: 0.00     Average packs/day: 0.3 packs/day for 10.0 years (2.5 ttl pk-yrs)     Types: Cigarettes     Start date: 1/10/1965     Quit date: 1/10/1975     Years since quittin.3    Smokeless tobacco: Never    Tobacco comments:     quit age 28   Vaping Use    Vaping status: Never Used   Substance and Sexual Activity    Alcohol use: Yes     Alcohol/week: 2.0 standard drinks of alcohol     Types: 1 Glasses of wine, 1 Cans of beer per week     Comment: approx 1 glass of win or beer a month    Drug use: No    Sexual activity: Not Currently        ROS:  Complete ROS reviewed by me and non-contributory to the chief complaint except as mentioned above.    Physical Exam:    Ht 5' 4\" (1.626 m)   Wt 202 lb (91.6 kg)   BMI 34.67 kg/m²   Knees: Inspection reveals intact overlying skin without obvious discoloration but mild varus deformity.  Significant patellofemoral crepitus is palpable through adequate arc of motion.  Mild patellofemoral and moderate tibiofemoral joint line tenderness is noted bilaterally.  No significant effusion is present bilaterally.  Ligaments are stable on gentle varus valgus stress with good endpoints.  Lachman and posterior drawer are both negative bilaterally.  Neurovascular status is intact on sensory, motor and perfusion assessment distally.    Imaging:     XR KNEE (3 VIEWS), RIGHT (CPT=73562)     Result Date: 2023  CONCLUSION:   There is mild joint space narrowing in medial and patellofemoral compartments of knee joint.   LOCATION:  [place]   Dictated by (CST): Wes Peoples MD on 9/20/2023 at 11:12 AM     Finalized by (VIKASH): Wes Peoples MD on 9/20/2023 at 11:13 AM          Assessment/Diagnoses:  Diagnoses and all orders for this visit:    Primary osteoarthritis of both knees  -     DRAIN/INJECT LARGE JOINT/BURSA  -     triamcinolone acetonide (Kenalog-40) 40 MG/ML injection 40 mg  -     DRAIN/INJECT LARGE JOINT/BURSA  -     triamcinolone acetonide (Kenalog-40) 40 MG/ML injection 40 mg      Plan:  I reviewed imaging and exam findings with the patient.  She is struggling from recurrent pain due to degenerative joint disease of the knees.  We discussed the etiology, natural history and treatment options in detail.  Treatments include activity modification, weight loss, anti-inflammatory use and possible injections.  In the long term, the patient's symptoms may progress and warrant consideration of total knee arthroplasty, but only if symptoms become severe.  For now, non-surgical treatment options are recommended.  We discussed the option for knee corticosteroid injection along with the potential risks, benefits and alternatives.  In light of progressive symptoms, the patient elects to proceed and gives written consent.  All questions were answered and the patient verbalized understanding and appreciation.  If symptoms are not improving over the next 2 to 4 weeks, I asked the patient to contact our office for possible next steps.    Knee Cortisone Injection Procedure:  After discussing risks, benefits and alternatives to cortisone injection including but not limited to needle infection, steroid flare, transient elevated blood sugars in diabetics or failed improvement, the patient gave written consent to proceed.  Using meticulous sterile technique, and after attempted aspiration, I injected 40 mg of Kenalog mixed with 4 cc of 1% Xylocaine at  the lateral patellofemoral joint of the right and left knees in sequence to minimal resistance.  The patient tolerated this well and a Band-Aid was applied to each knee.  Instructions were given to contact us if the patient experiences any adverse effects.      Nicole Acosta MD, [facility]  Orthopaedic Surgery   Sports Medicine/Knee and Shoulder  [facility]  t: [phone]  f: [phone]               This document was partially prepared using Dragon Medical voice recognition software.  Although every attempt is made to correct errors during dictation, discrepancies may still exist.

## 2024-05-26 DIAGNOSIS — I10 ESSENTIAL HYPERTENSION: ICD-10-CM

## 2024-05-29 RX ORDER — LISINOPRIL 5 MG/1
5 TABLET ORAL DAILY
Qty: 90 TABLET | Refills: 1 | Status: SHIPPED | OUTPATIENT
Start: 2024-05-29

## 2024-05-29 NOTE — TELEPHONE ENCOUNTER
Hypertension Medications Protocol [id number]/26/2024 06:01 PM   Protocol Details CMP or BMP in past 12 months    Last BP reading less than 140/90    In person appointment or virtual visit in the past 12 mos or appointment in next 3 mos    EGFRCR or GFRNAA > 50      3. Essential hypertension  Continue meds.   No future appointments.

## 2024-07-21 DIAGNOSIS — E03.9 ACQUIRED HYPOTHYROIDISM: ICD-10-CM

## 2024-07-23 RX ORDER — LEVOTHYROXINE SODIUM 0.05 MG/1
50 TABLET ORAL
Qty: 90 TABLET | Refills: 2 | Status: SHIPPED | OUTPATIENT
Start: 2024-07-23

## 2024-09-20 DIAGNOSIS — E78.5 DYSLIPIDEMIA: ICD-10-CM

## 2024-09-20 RX ORDER — ATORVASTATIN CALCIUM 40 MG/1
40 TABLET, FILM COATED ORAL NIGHTLY
Qty: 90 TABLET | Refills: 0 | Status: SHIPPED | OUTPATIENT
Start: 2024-09-20 | End: 2024-12-19

## 2024-09-20 NOTE — TELEPHONE ENCOUNTER
Cholesterol Medication Protocol [id number]/20/2024 09:27 AM   Protocol Details ALT < 80    ALT resulted within past year    Lipid panel within past 12 months    In person appointment or virtual visit in the past 12 mos or appointment in next 3 mos   4. Dyslipidemia  Continue statin.   - CMP in 6 months; Future  - Lipid in 6 months; Future     5. Atherosclerosis of aorta (HCC)  6. Bilateral carotid artery disease, unspecified type (HCC)  7. Tortuous aorta (HCC)  Continue statin.     Return in about 6 months (around 10/16/2024) for med check.   No future appointments.   - please call and schedule patient for med check due around 10/16/24. Please and thank you!  Labs due next month.  Mcm sent regarding adherence.

## 2024-09-23 NOTE — TELEPHONE ENCOUNTER
Future Appointments   Date Time Provider Department Center   10/16/2024  2:00 PM Elizabeth Bedolla, [facility] [address]

## 2024-10-14 ENCOUNTER — LAB ENCOUNTER (OUTPATIENT)
Dept: LAB | Age: 79
End: 2024-10-14
Attending: INTERNAL MEDICINE
Payer: MEDICARE

## 2024-10-14 DIAGNOSIS — E03.9 ACQUIRED HYPOTHYROIDISM: ICD-10-CM

## 2024-10-14 DIAGNOSIS — E11.65 TYPE 2 DIABETES MELLITUS WITH HYPERGLYCEMIA, WITHOUT LONG-TERM CURRENT USE OF INSULIN (HCC): ICD-10-CM

## 2024-10-14 DIAGNOSIS — E78.5 DYSLIPIDEMIA: ICD-10-CM

## 2024-10-14 LAB
ALBUMIN SERPL-MCNC: 4 G/DL (ref 3.2–4.8)
ALBUMIN/GLOB SERPL: 1.8 {RATIO} (ref 1–2)
ALP LIVER SERPL-CCNC: 98 U/L
ALT SERPL-CCNC: 14 U/L
ANION GAP SERPL CALC-SCNC: 5 MMOL/L (ref 0–18)
AST SERPL-CCNC: 18 U/L (ref ?–34)
BILIRUB SERPL-MCNC: 0.6 MG/DL (ref 0.2–1.1)
BUN BLD-MCNC: 20 MG/DL (ref 9–23)
CALCIUM BLD-MCNC: 9.3 MG/DL (ref 8.7–10.4)
CHLORIDE SERPL-SCNC: 108 MMOL/L (ref 98–112)
CHOLEST SERPL-MCNC: 140 MG/DL (ref ?–200)
CO2 SERPL-SCNC: 30 MMOL/L (ref 21–32)
CREAT BLD-MCNC: 0.89 MG/DL
CREAT UR-SCNC: 67 MG/DL
EGFRCR SERPLBLD CKD-EPI 2021: 65 ML/MIN/1.73M2 (ref 60–?)
EST. AVERAGE GLUCOSE BLD GHB EST-MCNC: 140 MG/DL (ref 68–126)
FASTING PATIENT LIPID ANSWER: YES
FASTING STATUS PATIENT QL REPORTED: YES
GLOBULIN PLAS-MCNC: 2.2 G/DL (ref 2–3.5)
GLUCOSE BLD-MCNC: 112 MG/DL (ref 70–99)
HBA1C MFR BLD: 6.5 % (ref ?–5.7)
HDLC SERPL-MCNC: 50 MG/DL (ref 40–59)
LDLC SERPL CALC-MCNC: 73 MG/DL (ref ?–100)
MICROALBUMIN UR-MCNC: <0.3 MG/DL
NONHDLC SERPL-MCNC: 90 MG/DL (ref ?–130)
OSMOLALITY SERPL CALC.SUM OF ELEC: 299 MOSM/KG (ref 275–295)
POTASSIUM SERPL-SCNC: 4.2 MMOL/L (ref 3.5–5.1)
PROT SERPL-MCNC: 6.2 G/DL (ref 5.7–8.2)
SODIUM SERPL-SCNC: 143 MMOL/L (ref 136–145)
TRIGL SERPL-MCNC: 90 MG/DL (ref 30–149)
VLDLC SERPL CALC-MCNC: 14 MG/DL (ref 0–30)

## 2024-10-14 PROCEDURE — 82043 UR ALBUMIN QUANTITATIVE: CPT

## 2024-10-14 PROCEDURE — 82570 ASSAY OF URINE CREATININE: CPT

## 2024-10-14 PROCEDURE — 36415 COLL VENOUS BLD VENIPUNCTURE: CPT

## 2024-10-14 PROCEDURE — 80061 LIPID PANEL: CPT

## 2024-10-14 PROCEDURE — 80053 COMPREHEN METABOLIC PANEL: CPT

## 2024-10-14 PROCEDURE — 83036 HEMOGLOBIN GLYCOSYLATED A1C: CPT

## 2024-10-16 ENCOUNTER — OFFICE VISIT (OUTPATIENT)
Dept: INTERNAL MEDICINE CLINIC | Facility: CLINIC | Age: 79
End: 2024-10-16
Payer: MEDICARE

## 2024-10-16 VITALS
HEIGHT: 64 IN | SYSTOLIC BLOOD PRESSURE: 104 MMHG | OXYGEN SATURATION: 98 % | WEIGHT: 198 LBS | RESPIRATION RATE: 16 BRPM | TEMPERATURE: 98 F | HEART RATE: 73 BPM | BODY MASS INDEX: 33.8 KG/M2 | DIASTOLIC BLOOD PRESSURE: 60 MMHG

## 2024-10-16 DIAGNOSIS — I77.9 BILATERAL CAROTID ARTERY DISEASE, UNSPECIFIED TYPE (HCC): ICD-10-CM

## 2024-10-16 DIAGNOSIS — I70.0 ATHEROSCLEROSIS OF AORTA (HCC): ICD-10-CM

## 2024-10-16 DIAGNOSIS — E11.22 CKD STAGE 3 DUE TO TYPE 2 DIABETES MELLITUS (HCC): Chronic | ICD-10-CM

## 2024-10-16 DIAGNOSIS — E78.5 DYSLIPIDEMIA: ICD-10-CM

## 2024-10-16 DIAGNOSIS — I47.10 SVT (SUPRAVENTRICULAR TACHYCARDIA) (HCC): ICD-10-CM

## 2024-10-16 DIAGNOSIS — N18.31 STAGE 3A CHRONIC KIDNEY DISEASE (HCC): Chronic | ICD-10-CM

## 2024-10-16 DIAGNOSIS — I77.1 TORTUOUS AORTA (HCC): ICD-10-CM

## 2024-10-16 DIAGNOSIS — E11.65 TYPE 2 DIABETES MELLITUS WITH HYPERGLYCEMIA, WITHOUT LONG-TERM CURRENT USE OF INSULIN (HCC): ICD-10-CM

## 2024-10-16 DIAGNOSIS — E03.9 ACQUIRED HYPOTHYROIDISM: ICD-10-CM

## 2024-10-16 DIAGNOSIS — Z12.31 ENCOUNTER FOR SCREENING MAMMOGRAM FOR BREAST CANCER: ICD-10-CM

## 2024-10-16 DIAGNOSIS — N18.30 CKD STAGE 3 DUE TO TYPE 2 DIABETES MELLITUS (HCC): Chronic | ICD-10-CM

## 2024-10-16 DIAGNOSIS — I10 ESSENTIAL HYPERTENSION: Primary | ICD-10-CM

## 2024-10-16 PROCEDURE — 1159F MED LIST DOCD IN RCRD: CPT | Performed by: NURSE PRACTITIONER

## 2024-10-16 PROCEDURE — 1170F FXNL STATUS ASSESSED: CPT | Performed by: NURSE PRACTITIONER

## 2024-10-16 PROCEDURE — 3078F DIAST BP <80 MM HG: CPT | Performed by: NURSE PRACTITIONER

## 2024-10-16 PROCEDURE — 99214 OFFICE O/P EST MOD 30 MIN: CPT | Performed by: NURSE PRACTITIONER

## 2024-10-16 PROCEDURE — 3008F BODY MASS INDEX DOCD: CPT | Performed by: NURSE PRACTITIONER

## 2024-10-16 PROCEDURE — G2211 COMPLEX E/M VISIT ADD ON: HCPCS | Performed by: NURSE PRACTITIONER

## 2024-10-16 PROCEDURE — 3074F SYST BP LT 130 MM HG: CPT | Performed by: NURSE PRACTITIONER

## 2024-10-16 RX ORDER — LISINOPRIL 5 MG/1
5 TABLET ORAL DAILY
Qty: 90 TABLET | Refills: 1 | Status: SHIPPED | OUTPATIENT
Start: 2024-10-16

## 2024-10-16 RX ORDER — LEVOTHYROXINE SODIUM 50 UG/1
50 TABLET ORAL
Qty: 90 TABLET | Refills: 2 | OUTPATIENT
Start: 2024-10-16

## 2024-10-16 RX ORDER — METOPROLOL TARTRATE 25 MG/1
TABLET, FILM COATED ORAL
Qty: 90 TABLET | Refills: 1 | Status: SHIPPED | OUTPATIENT
Start: 2024-10-16

## 2024-10-16 NOTE — PROGRESS NOTES
CHIEF COMPLAINT:     Chief Complaint   Patient presents with    Medication Follow-Up     Needs Some Refills       HPI:   Monica Nieto is a 79 year old female coming in for med check. Labs reviewed and discussed with patient.    HTN: Patient takes the BP medications as directed, no reported side effects. Denies chest pain, SOB, palpitations, headaches, visual disturbances. Patient is on low salt diet, non-smoker.    Hyperlipidemia: On statin, tolerating well, reports no SE. No muscle aches.     Hypothyroidism: On levothyroxine. No SE and no symptoms of hyperthyroidism (weight loss, diarrhea, anxiety, palpitations) or hypothyroidism (weight gain, depression, constipation, bradycardia) at this time.     Bilateral carotid diease: On statin and aspirin 325 mg.     DM: Denies excessive thirst or urination, neuropathy, hypoglycemia events. Taking medications as prescribed without side effects. A1C is controlled. Eye exam done this year per patient, will obtain records, urine for micro UTD, foot exam UTD. On a statin.     CKD: Stable, continue to monitor.    SVT: On BB, asymptomatic.     Past Medical History:    Abnormal Pap smear of cervix    Acute blood loss anemia    Acute, but ill-defined, cerebrovascular disease    Tia    Allergic rhinitis    Arrhythmia    Tachycardia    Arthritis    left shoulder- had xray    Back problem    Occas. pain    Calculus of kidney    Cancer (HCC)    Basel cell    Cataract    Cervical spondylosis    KATIANA I (cervical intraepithelial neoplasia I)    Actually KATIANA 1-2, , and KIRK 1    Complex endometrial hyperplasia with atypia    Disorder of thyroid    Esophageal reflux    Essential hypertension    Hemorrhoids    High blood pressure    High cholesterol    Hx of motion sickness    Hyperlipidemia    Hyperthyroidism    Hypothyroidism    Irregular bowel habits    Nail fungus    Onychomycosis Right foot, great toe     (normal spontaneous vaginal delivery) (HCC)    OAB (overactive bladder)     Obesity    Osteoarthritis    Left shoulder bursitis    Peripheral vascular disease (HCC)    PMB (postmenopausal bleeding)    Stroke, lacunar (HCC)    TIA & Lacunar Infarct - 2016 Mild plaque/no stenosis carotid arteries    Stroke, lacunar (HCC)    Visual impairment    glasses      Past Surgical History:   Procedure Laterality Date    Benign biopsy right      Cataract  2005    Both eyes    Colonoscopy N/A 2021    Procedure: COLONOSCOPY;  Surgeon: Jose F Randolph MD;  Location:  ENDOSCOPY    Colonoscopy      D & c      Dilation/curettage,diagnostic  2022    Abnormal vaginal bleeding    Hysterectomy      Leep      Needle biopsy right      bening 20 years ago      /65    Oophorectomy Bilateral 2022    at time of hyst    Salpingectomy Bilateral 2022    at time of hyst     Total abdominal hysterectomy  2022    [facility], BSO - Dr. 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   Tobacco Use    Smoking status: Former     Current packs/day: 0.00     Average packs/day: 0.3 packs/day for 10.0 years (2.5 ttl pk-yrs)     Types: Cigarettes     Start date: 1/10/1965     Quit date: 1/10/1975     Years since quittin.8     Passive exposure: Past    Smokeless tobacco: Never    Tobacco comments:     quit age 28   Vaping Use    Vaping status: Never Used   Substance and Sexual Activity    Alcohol use: Yes     Alcohol/week: 2.0 standard drinks of alcohol     Types: 1 Glasses of wine, 1 Cans of beer per week     Comment: Occasionally    Drug use: No    Sexual activity: Not Currently   Other Topics Concern    Caffeine Concern No    Stress Concern No    Weight Concern No    Special Diet No    Exercise Yes    Seat Belt Yes     Social Drivers of Health      [facility]      Family  History:  Family History   Problem Relation Age of Onset    Other (CHF [Other]) Mother     Other (DM, [Other]) Mother     Other (arthritis [Other]) Mother     Heart Disorder Mother         Congestive heart failure    Diabetes Sister     Other (CABG [Other]) Brother     Other (CVA [Other]) Brother     Other (bladder cancer [Other]) Brother 79    Diabetes Son     Other (TB [Other]) Maternal Grandmother     Breast Cancer Niece 57    Other (MI, [Other]) Other     Other (HTN,hypercholesterolemia [Other]) Other       Allergies:  Allergies[1]   Current Meds:  Current Outpatient Medications   Medication Sig Dispense Refill    atorvastatin 40 MG Oral Tab Take 1 tablet (40 mg total) by mouth nightly. 90 tablet 0    levothyroxine 50 MCG Oral Tab Take 1 tablet (50 mcg total) by mouth before breakfast. 90 tablet 2    lisinopril 5 MG Oral Tab Take 1 tablet (5 mg total) by mouth daily. 90 tablet 1    metoprolol tartrate 25 MG Oral Tab TAKE 1/2 TABLET(12.5 MG) BY MOUTH TWICE DAILY 90 tablet 1    ibuprofen 200 MG Oral Tab Take 1 tablet (200 mg total) by mouth every 6 (six) hours as needed for Pain.      diclofenac 1 % External Gel Apply 2 g topically 2 (two) times daily as needed. 50 g 0    Naproxen Sodium 220 MG Oral Cap Take 440 mg by mouth every 6 (six) hours as needed.      aspirin 325 MG Oral Tab Take 1 tablet (325 mg total) by mouth daily.         Counseling given: Not Answered  Tobacco comments: quit age 28       REVIEW OF SYSTEMS:   See HPI.    EXAM:     /60   Pulse 73   Temp 97.9 °F (36.6 °C) (Temporal)   Resp 16   Ht 5' 4\" (1.626 m)   Wt 198 lb (89.8 kg)   SpO2 98%   BMI 33.99 kg/m²   Body mass index is 33.99 kg/m².   Vital signs reviewed. Appears stated age, well groomed, in no acute distress.  Physical Exam:  GENERAL: Patient is alert, awake and oriented, well developed, well nourished.  HEENT: Head: Normocephalic, atraumatic.   HEART: RRR without murmur.  LUNGS: Clear to auscultation bilaterally, no  rales/rhonchi/wheezing.  ABDOMEN: good BS's, no masses, HSM or tenderness  MUSCULOSKELETAL: No obvious joint deformity or swelling.   EXTREMITIES: No edema, no cyanosis, no clubbing, FROM  NEURO: Oriented time three.     LABS:      Lab Results   Component Value Date    WBC 8.2 08/16/2022    RBC 4.39 08/16/2022    HGB 13.0 08/16/2022    HCT 40.9 08/16/2022    MCV 93.2 08/16/2022    MCH 29.6 08/16/2022    MCHC 31.8 08/16/2022    RDW 12.8 08/16/2022    .0 08/16/2022      Lab Results   Component Value Date     (H) 10/14/2024    BUN 20 10/14/2024    BUNCREA NOT APPLICABLE 09/24/2020    CREATSERUM 0.89 10/14/2024    ANIONGAP 5 10/14/2024    GFR 38 (L) 04/09/2016    GFRNAA 66 03/29/2022    GFRAA 76 03/29/2022    CA 9.3 10/14/2024    OSMOCALC 299 (H) 10/14/2024    ALKPHO 98 10/14/2024    AST 18 10/14/2024    ALT 14 10/14/2024    BILT 0.6 10/14/2024    TP 6.2 10/14/2024    ALB 4.0 10/14/2024    GLOBULIN 2.2 10/14/2024    AGRATIO 1.5 09/24/2020     10/14/2024    K 4.2 10/14/2024     10/14/2024    CO2 30.0 10/14/2024      Lab Results   Component Value Date    CHOLEST 140 10/14/2024    TRIG 90 10/14/2024    HDL 50 10/14/2024    LDL 73 10/14/2024    VLDL 14 10/14/2024    TCHDLRATIO 2.7 09/24/2020    NONHDLC 90 10/14/2024      Lab Results   Component Value Date    T4F 1.2 03/13/2022    TSH 2.900 04/11/2024    TSHT4 3.52 09/24/2020      Lab Results   Component Value Date     (H) 10/14/2024    A1C 6.5 (H) 10/14/2024        IMAGING:     No results found.     ASSESSMENT AND PLAN:   1. Essential hypertension  -BP lower end of normal today, asymptomatic.   -Continue to monitor at home and notify us if persistently <100/60 and/or symptomatic  - CMP in 6 months; Future    2. Dyslipidemia  -Continue statin  - Lipid in 6 months; Future    3. Bilateral carotid artery disease, unspecified type (HCC)  -Continue statin and aspirin    4. Atherosclerosis of aorta (HCC)  -Continue statin    5. Tortuous aorta  (HCC)  -Continue statin    6. Acquired hypothyroidism  -Continue levothyroxine  - TSH [E]; Future    7. Type 2 diabetes mellitus with hyperglycemia, without long-term current use of insulin ([facility])  -A1C controlled  -Continue current management  -Continue low carb diet and regular exercise  -Foot exam UTD  -Eye exam done this year, will obtain records  -Urine for micro UTD  -On a statin  -Repeat labs in 6 months  - Hemoglobin A1C in 6 months; Future    8. Stage 3a chronic kidney disease (HCC)  9. CKD stage 3 due to type 2 diabetes mellitus ([facility])  -Stable, continue to monitor    10. SVT (supraventricular tachycardia) ([facility])  -Continue metoprolol    11. Encounter for screening mammogram for breast cancer  -Mammogram due in Jan, order [facility]  - [facility] LARRY 2D+3D SCREENING BILAT (CPT=77067/03625); Future     The patient indicates understanding of these issues and agrees to the plan.  Return in about 6 months (around 4/16/2025) for supervisit.    Elizabeth Bedolla, APRN  10/16/2024         [1]   Allergies  Allergen Reactions    Cholestatin ITCHING and Runny nose    Cyclobenzaprine RASH     Flushing and chest rash which she gets from various meds and other triggers    Seasonal Runny nose and ITCHING

## 2024-10-16 NOTE — TELEPHONE ENCOUNTER
Disp Refills Start End    levothyroxine 50 MCG Oral Tab 90 tablet 2 7/23/2024 --    Sig - Route: Take 1 tablet (50 mcg total) by mouth before breakfast. - Oral    Sent to pharmacy as: Levothyroxine Sodium 50 MCG Oral Tablet (Synthroid)    E-Prescribing Status: Receipt confirmed by pharmacy (7/23/2024  1:53 PM CDT)

## 2024-11-26 ENCOUNTER — HOSPITAL ENCOUNTER (INPATIENT)
Facility: HOSPITAL | Age: 79
LOS: 3 days | Discharge: HOME HEALTH | DRG: 522 | End: 2024-11-29
Attending: EMERGENCY MEDICINE | Admitting: STUDENT IN AN ORGANIZED HEALTH CARE EDUCATION/TRAINING PROGRAM
Payer: MEDICARE

## 2024-11-26 ENCOUNTER — APPOINTMENT (OUTPATIENT)
Dept: GENERAL RADIOLOGY | Facility: HOSPITAL | Age: 79
DRG: 522 | End: 2024-11-26
Attending: EMERGENCY MEDICINE
Payer: MEDICARE

## 2024-11-26 ENCOUNTER — ANESTHESIA (OUTPATIENT)
Dept: EMERGENCY DEPT | Facility: HOSPITAL | Age: 79
DRG: 522 | End: 2024-11-26
Payer: MEDICARE

## 2024-11-26 ENCOUNTER — ANESTHESIA EVENT (OUTPATIENT)
Dept: SURGERY | Facility: HOSPITAL | Age: 79
DRG: 522 | End: 2024-11-26
Payer: MEDICARE

## 2024-11-26 ENCOUNTER — ANESTHESIA EVENT (OUTPATIENT)
Dept: EMERGENCY DEPT | Facility: HOSPITAL | Age: 79
DRG: 522 | End: 2024-11-26
Payer: MEDICARE

## 2024-11-26 DIAGNOSIS — S72.002A CLOSED FRACTURE OF LEFT HIP, INITIAL ENCOUNTER (HCC): ICD-10-CM

## 2024-11-26 DIAGNOSIS — S72.002A LEFT DISPLACED FEMORAL NECK FRACTURE (HCC): Primary | ICD-10-CM

## 2024-11-26 PROBLEM — S72.009A HIP FRACTURE (HCC): Status: ACTIVE | Noted: 2024-11-26

## 2024-11-26 LAB
ALBUMIN SERPL-MCNC: 3.8 G/DL (ref 3.2–4.8)
ALBUMIN/GLOB SERPL: 1.7 {RATIO} (ref 1–2)
ALP LIVER SERPL-CCNC: 90 U/L
ALT SERPL-CCNC: 16 U/L
ANION GAP SERPL CALC-SCNC: 4 MMOL/L (ref 0–18)
APTT PPP: 25.8 SECONDS (ref 23–36)
AST SERPL-CCNC: 22 U/L (ref ?–34)
ATRIAL RATE: 64 BPM
BASOPHILS # BLD AUTO: 0.02 X10(3) UL (ref 0–0.2)
BASOPHILS NFR BLD AUTO: 0.2 %
BILIRUB SERPL-MCNC: 0.8 MG/DL (ref 0.2–1.1)
BILIRUB UR QL STRIP.AUTO: NEGATIVE
BUN BLD-MCNC: 19 MG/DL (ref 9–23)
CALCIUM BLD-MCNC: 9 MG/DL (ref 8.7–10.4)
CHLORIDE SERPL-SCNC: 109 MMOL/L (ref 98–112)
CLARITY UR REFRACT.AUTO: CLEAR
CO2 SERPL-SCNC: 29 MMOL/L (ref 21–32)
CREAT BLD-MCNC: 0.88 MG/DL
EGFRCR SERPLBLD CKD-EPI 2021: 66 ML/MIN/1.73M2 (ref 60–?)
EOSINOPHIL # BLD AUTO: 0.22 X10(3) UL (ref 0–0.7)
EOSINOPHIL NFR BLD AUTO: 2.6 %
ERYTHROCYTE [DISTWIDTH] IN BLOOD BY AUTOMATED COUNT: 12.5 %
GLOBULIN PLAS-MCNC: 2.2 G/DL (ref 2–3.5)
GLUCOSE BLD-MCNC: 102 MG/DL (ref 70–99)
GLUCOSE UR STRIP.AUTO-MCNC: NORMAL MG/DL
HCT VFR BLD AUTO: 34.7 %
HGB BLD-MCNC: 11.7 G/DL
IMM GRANULOCYTES # BLD AUTO: 0.02 X10(3) UL (ref 0–1)
IMM GRANULOCYTES NFR BLD: 0.2 %
INR BLD: 1.06 (ref 0.8–1.2)
KETONES UR STRIP.AUTO-MCNC: NEGATIVE MG/DL
LEUKOCYTE ESTERASE UR QL STRIP.AUTO: 250
LYMPHOCYTES # BLD AUTO: 1.75 X10(3) UL (ref 1–4)
LYMPHOCYTES NFR BLD AUTO: 20.9 %
MCH RBC QN AUTO: 30 PG (ref 26–34)
MCHC RBC AUTO-ENTMCNC: 33.7 G/DL (ref 31–37)
MCV RBC AUTO: 89 FL
MONOCYTES # BLD AUTO: 0.87 X10(3) UL (ref 0.1–1)
MONOCYTES NFR BLD AUTO: 10.4 %
MRSA NASAL: NEGATIVE
NEUTROPHILS # BLD AUTO: 5.5 X10 (3) UL (ref 1.5–7.7)
NEUTROPHILS # BLD AUTO: 5.5 X10(3) UL (ref 1.5–7.7)
NEUTROPHILS NFR BLD AUTO: 65.7 %
OSMOLALITY SERPL CALC.SUM OF ELEC: 296 MOSM/KG (ref 275–295)
P AXIS: 69 DEGREES
P-R INTERVAL: 178 MS
PH UR STRIP.AUTO: 7 [PH] (ref 5–8)
PLATELET # BLD AUTO: 188 10(3)UL (ref 150–450)
POTASSIUM SERPL-SCNC: 4.2 MMOL/L (ref 3.5–5.1)
PROT SERPL-MCNC: 6 G/DL (ref 5.7–8.2)
PROT UR STRIP.AUTO-MCNC: NEGATIVE MG/DL
PROTHROMBIN TIME: 13.9 SECONDS (ref 11.6–14.8)
Q-T INTERVAL: 412 MS
QRS DURATION: 98 MS
QTC CALCULATION (BEZET): 425 MS
R AXIS: -32 DEGREES
RBC # BLD AUTO: 3.9 X10(6)UL
RBC UR QL AUTO: NEGATIVE
SODIUM SERPL-SCNC: 142 MMOL/L (ref 136–145)
SP GR UR STRIP.AUTO: 1.01 (ref 1–1.03)
STAPH A BY PCR: NEGATIVE
T AXIS: 24 DEGREES
UROBILINOGEN UR STRIP.AUTO-MCNC: NORMAL MG/DL
VENTRICULAR RATE: 64 BPM
WBC # BLD AUTO: 8.4 X10(3) UL (ref 4–11)

## 2024-11-26 PROCEDURE — 99222 1ST HOSP IP/OBS MODERATE 55: CPT | Performed by: STUDENT IN AN ORGANIZED HEALTH CARE EDUCATION/TRAINING PROGRAM

## 2024-11-26 PROCEDURE — 73502 X-RAY EXAM HIP UNI 2-3 VIEWS: CPT | Performed by: EMERGENCY MEDICINE

## 2024-11-26 PROCEDURE — 3E0T3BZ INTRODUCTION OF ANESTHETIC AGENT INTO PERIPHERAL NERVES AND PLEXI, PERCUTANEOUS APPROACH: ICD-10-PCS | Performed by: ANESTHESIOLOGY

## 2024-11-26 PROCEDURE — 71045 X-RAY EXAM CHEST 1 VIEW: CPT | Performed by: EMERGENCY MEDICINE

## 2024-11-26 PROCEDURE — 76942 ECHO GUIDE FOR BIOPSY: CPT | Performed by: ANESTHESIOLOGY

## 2024-11-26 RX ORDER — ONDANSETRON 2 MG/ML
4 INJECTION INTRAMUSCULAR; INTRAVENOUS EVERY 6 HOURS PRN
Status: DISCONTINUED | OUTPATIENT
Start: 2024-11-26 | End: 2024-11-29

## 2024-11-26 RX ORDER — ENOXAPARIN SODIUM 100 MG/ML
40 INJECTION SUBCUTANEOUS DAILY
Status: DISCONTINUED | OUTPATIENT
Start: 2024-11-26 | End: 2024-11-27

## 2024-11-26 RX ORDER — LISINOPRIL 5 MG/1
5 TABLET ORAL DAILY
Status: DISCONTINUED | OUTPATIENT
Start: 2024-11-27 | End: 2024-11-29

## 2024-11-26 RX ORDER — MORPHINE SULFATE 4 MG/ML
4 INJECTION, SOLUTION INTRAMUSCULAR; INTRAVENOUS EVERY 2 HOUR PRN
Status: DISCONTINUED | OUTPATIENT
Start: 2024-11-26 | End: 2024-11-29

## 2024-11-26 RX ORDER — ATORVASTATIN CALCIUM 40 MG/1
40 TABLET, FILM COATED ORAL NIGHTLY
Status: DISCONTINUED | OUTPATIENT
Start: 2024-11-26 | End: 2024-11-29

## 2024-11-26 RX ORDER — MORPHINE SULFATE 4 MG/ML
4 INJECTION, SOLUTION INTRAMUSCULAR; INTRAVENOUS EVERY 30 MIN PRN
Status: DISCONTINUED | OUTPATIENT
Start: 2024-11-26 | End: 2024-11-26

## 2024-11-26 RX ORDER — OXYCODONE HYDROCHLORIDE 5 MG/1
5 TABLET ORAL EVERY 4 HOURS PRN
Status: DISCONTINUED | OUTPATIENT
Start: 2024-11-26 | End: 2024-11-27

## 2024-11-26 RX ORDER — DEXAMETHASONE SODIUM PHOSPHATE 10 MG/ML
8 INJECTION, SOLUTION INTRAMUSCULAR; INTRAVENOUS ONCE
Status: COMPLETED | OUTPATIENT
Start: 2024-11-27 | End: 2024-11-27

## 2024-11-26 RX ORDER — HYDRALAZINE HYDROCHLORIDE 20 MG/ML
5 INJECTION INTRAMUSCULAR; INTRAVENOUS EVERY 6 HOURS PRN
Status: DISCONTINUED | OUTPATIENT
Start: 2024-11-26 | End: 2024-11-29

## 2024-11-26 RX ORDER — ONDANSETRON 2 MG/ML
4 INJECTION INTRAMUSCULAR; INTRAVENOUS EVERY 4 HOURS PRN
Status: DISCONTINUED | OUTPATIENT
Start: 2024-11-26 | End: 2024-11-26

## 2024-11-26 RX ORDER — SODIUM CHLORIDE 9 MG/ML
10 INJECTION, SOLUTION INTRAMUSCULAR; INTRAVENOUS; SUBCUTANEOUS AS NEEDED
Status: DISCONTINUED | OUTPATIENT
Start: 2024-11-26 | End: 2024-11-29

## 2024-11-26 RX ORDER — POLYETHYLENE GLYCOL 3350 17 G/17G
17 POWDER, FOR SOLUTION ORAL DAILY PRN
Status: DISCONTINUED | OUTPATIENT
Start: 2024-11-26 | End: 2024-11-29

## 2024-11-26 RX ORDER — SODIUM PHOSPHATE, DIBASIC AND SODIUM PHOSPHATE, MONOBASIC 7; 19 G/230ML; G/230ML
1 ENEMA RECTAL ONCE AS NEEDED
Status: DISCONTINUED | OUTPATIENT
Start: 2024-11-26 | End: 2024-11-29

## 2024-11-26 RX ORDER — ACETAMINOPHEN 500 MG
1000 TABLET ORAL EVERY 4 HOURS PRN
Status: DISCONTINUED | OUTPATIENT
Start: 2024-11-26 | End: 2024-11-29

## 2024-11-26 RX ORDER — HYDROMORPHONE HYDROCHLORIDE 1 MG/ML
0.4 INJECTION, SOLUTION INTRAMUSCULAR; INTRAVENOUS; SUBCUTANEOUS EVERY 2 HOUR PRN
Status: DISCONTINUED | OUTPATIENT
Start: 2024-11-26 | End: 2024-11-27

## 2024-11-26 RX ORDER — BISACODYL 10 MG
10 SUPPOSITORY, RECTAL RECTAL
Status: DISCONTINUED | OUTPATIENT
Start: 2024-11-26 | End: 2024-11-29

## 2024-11-26 RX ORDER — SENNOSIDES 8.6 MG
17.2 TABLET ORAL NIGHTLY PRN
Status: DISCONTINUED | OUTPATIENT
Start: 2024-11-26 | End: 2024-11-29

## 2024-11-26 RX ORDER — TRAMADOL HYDROCHLORIDE 50 MG/1
50 TABLET ORAL EVERY 6 HOURS SCHEDULED
Status: DISCONTINUED | OUTPATIENT
Start: 2024-11-26 | End: 2024-11-27

## 2024-11-26 RX ORDER — ACETAMINOPHEN 325 MG/1
650 TABLET ORAL
Status: DISCONTINUED | OUTPATIENT
Start: 2024-11-26 | End: 2024-11-27

## 2024-11-26 RX ORDER — MORPHINE SULFATE 2 MG/ML
1 INJECTION, SOLUTION INTRAMUSCULAR; INTRAVENOUS EVERY 2 HOUR PRN
Status: DISCONTINUED | OUTPATIENT
Start: 2024-11-26 | End: 2024-11-29

## 2024-11-26 RX ORDER — LEVOTHYROXINE SODIUM 50 UG/1
50 TABLET ORAL
Status: DISCONTINUED | OUTPATIENT
Start: 2024-11-26 | End: 2024-11-29

## 2024-11-26 RX ORDER — MULTIVITAMIN
1 TABLET ORAL DAILY
COMMUNITY

## 2024-11-26 RX ORDER — LIDOCAINE HYDROCHLORIDE 10 MG/ML
2 INJECTION, SOLUTION EPIDURAL; INFILTRATION; INTRACAUDAL; PERINEURAL AS NEEDED
Status: DISCONTINUED | OUTPATIENT
Start: 2024-11-26 | End: 2024-11-29

## 2024-11-26 RX ORDER — METOCLOPRAMIDE HYDROCHLORIDE 5 MG/ML
5 INJECTION INTRAMUSCULAR; INTRAVENOUS EVERY 8 HOURS PRN
Status: DISCONTINUED | OUTPATIENT
Start: 2024-11-26 | End: 2024-11-29

## 2024-11-26 RX ORDER — ROPIVACAINE HYDROCHLORIDE 5 MG/ML
30 INJECTION, SOLUTION EPIDURAL; INFILTRATION; PERINEURAL AS NEEDED
Status: DISCONTINUED | OUTPATIENT
Start: 2024-11-26 | End: 2024-11-29

## 2024-11-26 RX ORDER — ECHINACEA PURPUREA EXTRACT 125 MG
1 TABLET ORAL
Status: DISCONTINUED | OUTPATIENT
Start: 2024-11-26 | End: 2024-11-29

## 2024-11-26 RX ORDER — MORPHINE SULFATE 2 MG/ML
2 INJECTION, SOLUTION INTRAMUSCULAR; INTRAVENOUS EVERY 2 HOUR PRN
Status: DISCONTINUED | OUTPATIENT
Start: 2024-11-26 | End: 2024-11-29

## 2024-11-26 RX ORDER — HYDROMORPHONE HYDROCHLORIDE 1 MG/ML
0.2 INJECTION, SOLUTION INTRAMUSCULAR; INTRAVENOUS; SUBCUTANEOUS EVERY 2 HOUR PRN
Status: DISCONTINUED | OUTPATIENT
Start: 2024-11-26 | End: 2024-11-27

## 2024-11-26 NOTE — ED QUICK NOTES
Rounding Completed    Plan of Care reviewed. Waiting for anesthesiologist and clean, inpatient bed .  Elimination needs assessed, pure wick placed    Bed is locked and in lowest position. Call light within reach.

## 2024-11-26 NOTE — ANESTHESIA PREPROCEDURE EVALUATION
PRE-OP EVALUATION    Patient Name: Monica Nieto    Admit Diagnosis: No admission diagnoses are documented for this encounter.    Pre-op Diagnosis: * No pre-op diagnosis entered *        Anesthesia Procedure: ANESTHESIA NERVE BLOCK    * No surgeons found in log *    Pre-op vitals reviewed.  Temp: 97.8 °F (36.6 °C)  Pulse: 64  Resp: 13  BP: 173/69  SpO2: 100 %  Body mass index is 31.47 kg/m².    Current medications reviewed.  Hospital Medications:  • lidocaine PF (Xylocaine-MPF) 1% injection  2 mL Injection PRN   • ropivacaine (Naropin) 0.5% injection  30 mL Injection PRN   • EPINEPHrine (Adrenalin) 1 MG/ML injection (Allergic Reaction Kit) 1 mg  1 mg Injection PRN   • sodium chloride 0.9% PF injection 10 mL  10 mL Injection PRN       Outpatient Medications:   Prescriptions Prior to Admission[1]    Allergies: Cholestatin, Cyclobenzaprine, and Seasonal      Anesthesia Evaluation    Patient summary reviewed.    Anesthetic Complications           GI/Hepatic/Renal      (+) GERD       (+) chronic renal disease and CRI                   Cardiovascular                  (+) hypertension   (+) hyperlipidemia               (+) dysrhythmias and SVT                  Endo/Other      (+) diabetes  type 2,   (+) hypothyroidism                       Pulmonary                           Neuro/Psych          (+) CVA                    Hypothyroidism Dyslipidemia  Abnormal Pap smear of cervix Essential hypertension  IGT (impaired glucose tolerance) Carotid disease, bilateral (HCC)  SVT (supraventricular tachycardia) (HCC) Atherosclerosis of aorta (HCC)  Tortuous aorta (HCC) Cervical spondylosis  History of stroke Lumbar spondylosis  PMB (postmenopausal bleeding) Complex atypical endometrial hyperplasia  Acute blood loss anemia           Past Surgical History:   Procedure Laterality Date   • Benign biopsy right     • Cataract  2005    Both eyes   • Colonoscopy N/A 09/07/2021    Procedure: COLONOSCOPY;  Surgeon: Jose F Randolph MD;   Location:  ENDOSCOPY   • Colonoscopy     • D & c     • Dilation/curettage,diagnostic  2022    Abnormal vaginal bleeding   • Hysterectomy     • Leep     • Needle biopsy right      bening 20 years ago   •      • Oophorectomy Bilateral 2022    at time of hyst   • Salpingectomy Bilateral 2022    at time of hyst    • Total abdominal hysterectomy  2022    [facility] - Dr. 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   Tobacco Use   • Smoking status: Former     Current packs/day: 0.00     Average packs/day: 0.3 packs/day for 10.0 years (2.5 ttl pk-yrs)     Types: Cigarettes     Start date: 1/10/1965     Quit date: 1/10/1975     Years since quittin.9     Passive exposure: Past   • Smokeless tobacco: Never   • Tobacco comments:     quit age 28   Vaping Use   • Vaping status: Never Used   Substance and Sexual Activity   • Alcohol use: Yes     Alcohol/week: 2.0 standard drinks of alcohol     Types: 1 Glasses of wine, 1 Cans of beer per week     Comment: Occasionally   • Drug use: No   • Sexual activity: Not Currently   Other Topics Concern   • Caffeine Concern No   • Stress Concern No   • Weight Concern No   • Special Diet No   • Exercise Yes   • Seat Belt Yes     History   Drug Use No     Available pre-op labs reviewed.     Lab Results   Component Value Date     10/14/2024    K 4.2 10/14/2024     10/14/2024    CO2 30.0 10/14/2024    BUN 20 10/14/2024    CREATSERUM 0.89 10/14/2024     (H) 10/14/2024    CA 9.3 10/14/2024            Airway      Mallampati: II  Mouth opening: >3 FB  TM distance: > 6 cm  Neck ROM: full Cardiovascular      Rhythm: regular  Rate: normal     Dental             Pulmonary      Breath sounds clear to auscultation bilaterally.               Other findings        ASA: 2   Plan: other        Post-procedure pain management plan discussed with surgeon and patient.  Surgeon requests: regional block  Comment: The risks and benefits of a nerve block for postoperative pain management were d/w pt including nerve injury, bleeding and infection.   Plan/risks discussed with: patient            Present on Admission:  **None**             [1] (Not in a hospital admission)

## 2024-11-26 NOTE — ED INITIAL ASSESSMENT (HPI)
Pt in via EMS from home. Pt had a mechanical fall on Saturday and did not hit her head but since then complains of left hip pain. Pt has been able to ambulate since with the help of a walker. Pt is not on blood thinners. Pt is A&Ox4. EMS reports no shortening or deformity. Pt has hx of hypertension and arthritis.

## 2024-11-26 NOTE — PLAN OF CARE
NURSING ADMISSION NOTE      Patient admitted via Cart from ER  Oriented to room.  Safety precautions initiated.  Bed in low position.  Call light in reach.    Skin check completed with WILLIAM Mason present. No skin breakdown noted.

## 2024-11-26 NOTE — PHYSICAL THERAPY NOTE
PT orders received and chart reviewed. Pt with a L femoral neck fracture with plan for surgery tomorrow 11/27/24. Will need updated orders and WB status post operatively.

## 2024-11-26 NOTE — ED PROVIDER NOTES
Patient Seen in: [facility] Emergency Department      History     Chief Complaint   Patient presents with    Fall     Stated Complaint: Left hip pain from fall on Saturday    Subjective:   HPI      This is an 79-year-old female presenting by ambulance with persistent left hip pain since a fall 3 days ago.  She was at a friend's house, missed 1 step and fell down that step, landing directly on the hip.  Did not strike her head or injure anything else.  Has had pain there since.  At baseline she uses a cane to get around, has been using a walker for the last 2 days, still able to get around but it is very sore.  She was up on it a lot more yesterday and it was very sore last night.    Objective:     Past Medical History:    Abnormal Pap smear of cervix    Acute blood loss anemia    Acute, but ill-defined, cerebrovascular disease    Tia    Allergic rhinitis    Arrhythmia    Tachycardia    Arthritis    left shoulder- had xray    Back problem    Occas. pain    Calculus of kidney    Cancer (HCC)    Basel cell    Cataract    Cervical spondylosis    KATIANA I (cervical intraepithelial neoplasia I)    Actually KATIANA 1-2, , and KIRK 1    Complex endometrial hyperplasia with atypia    Disorder of thyroid    Esophageal reflux    Essential hypertension    Hemorrhoids    High blood pressure    High cholesterol    Hx of motion sickness    Hyperlipidemia    Hyperthyroidism    Hypothyroidism    Irregular bowel habits    Nail fungus    Onychomycosis Right foot, great toe     (normal spontaneous vaginal delivery) (HCC)    OAB (overactive bladder)    Obesity    Osteoarthritis    Left shoulder bursitis    Peripheral vascular disease (HCC)    PMB (postmenopausal bleeding)    Stroke, lacunar (HCC)    TIA & Lacunar Infarct - 2016 Mild plaque/no stenosis carotid arteries    Stroke, lacunar (HCC)    Visual impairment    glasses              Past Surgical History:   Procedure Laterality Date    Benign biopsy right      Cataract       Both eyes    Colonoscopy N/A 2021    Procedure: COLONOSCOPY;  Surgeon: Jose F Randolph MD;  Location:  ENDOSCOPY    Colonoscopy      D & c      Dilation/curettage,diagnostic  2022    Abnormal vaginal bleeding    Hysterectomy      Leep      Needle biopsy right      bening 20 years ago      1963/65    Oophorectomy Bilateral 2022    at time of hyst    Salpingectomy Bilateral 2022    at time of hyst     Total abdominal hysterectomy  2022    TOMASA, BSO - Dr. 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   Tobacco Use    Smoking status: Former     Current packs/day: 0.00     Average packs/day: 0.3 packs/day for 10.0 years (2.5 ttl pk-yrs)     Types: Cigarettes     Start date: 1/10/1965     Quit date: 1/10/1975     Years since quittin.9     Passive exposure: Past    Smokeless tobacco: Never    Tobacco comments:     quit age 28   Vaping Use    Vaping status: Never Used   Substance and Sexual Activity    Alcohol use: Yes     Alcohol/week: 2.0 standard drinks of alcohol     Types: 1 Glasses of wine, 1 Cans of beer per week     Comment: Occasionally    Drug use: No    Sexual activity: Not Currently   Other Topics Concern    Caffeine Concern No    Stress Concern No    Weight Concern No    Special Diet No    Exercise Yes    Seat Belt Yes     Social Drivers of Health      Received from [facility]                  Physical Exam     ED Triage Vitals [24 0836]   BP (!) 165/58   Pulse 72   Resp 20   Temp 97.8 °F (36.6 °C)   Temp src Oral   SpO2 98 %   O2 Device None (Room air)       Current Vitals:   Vital Signs  BP: (!) 165/58  Pulse: 66  Resp: 20  Temp: 97.8 °F (36.6 °C)  Temp src: Oral  MAP (mmHg): 89    Oxygen Therapy  SpO2: 100 %  O2 Device: None (Room air)        Physical Exam  Vitals and  nursing note reviewed.   Constitutional:       Appearance: She is well-developed.   HENT:      Head: Normocephalic and atraumatic.   Eyes:      Conjunctiva/sclera: Conjunctivae normal.      Pupils: Pupils are equal, round, and reactive to light.   Cardiovascular:      Rate and Rhythm: Normal rate and regular rhythm.      Heart sounds: Normal heart sounds.   Pulmonary:      Effort: Pulmonary effort is normal.      Breath sounds: Normal breath sounds.   Abdominal:      General: Bowel sounds are normal.      Palpations: Abdomen is soft.   Musculoskeletal:         General: Normal range of motion.      Cervical back: Normal range of motion and neck supple.      Comments: There is diffuse tenderness to palpation around the left hip.  There does look to be a little external rotation of the left lower extremity.  No obvious shortening.  No tenderness in the knee or ankle.   Skin:     General: Skin is warm and dry.   Neurological:      Mental Status: She is alert and oriented to person, place, and time.             ED Course     Labs Reviewed   COMP METABOLIC PANEL (14)   CBC WITH DIFFERENTIAL WITH PLATELET   PROTHROMBIN TIME (PT)   PTT, ACTIVATED     EKG    Rate, intervals and axes as noted on EKG Report.  Rate: 64  Rhythm: Sinus Rhythm  Reading: Sinus rhythm.  Left axis deviation.  No ST segment or T wave changes.  No old immediately available for comparison.           XR HIP W OR WO PELVIS 2 OR 3 VIEWS, LEFT (CPT=73502)    Result Date: 11/26/2024  PROCEDURE:  XR HIP W OR WO PELVIS 2 OR 3 VIEWS, LEFT (CPT=73502)  TECHNIQUE:  Unilateral 2 to 3 views of the hip and pelvis if performed.  COMPARISON:  None.  INDICATIONS:  Status post fall with left hip injury, pain  PATIENT STATED HISTORY: (As transcribed by Technologist)  Patient states fall on Saturday and having pain to left hip.    FINDINGS:  There is a left femoral neck fracture with minimal superolateral displacement of the distal fracture fragment by 6 mm.  No  additional osseous injuries.  Diffuse osseous demineralization noted.  Mild bilateral hip arthropathy.  Mild bilateral SI joint arthropathy.            CONCLUSION:  Left femoral neck fracture with minimal superolateral displacement of the distal fracture fragment by 6 mm.  Diffuse osseous demineralization suspicious for osteopenia versus osteoporosis.  Mild bilateral hip arthropathy.  Mild bilateral SI joint arthropathy.    LOCATION:  [facility]   Dictated by (CST): Kevin Motta DO on 11/26/2024 at 9:10 AM     Finalized by (CST): Kevin Motta DO on 11/26/2024 at 9:12 AM             MDM      79-year-old female presenting with left hip pain since a fall 3 days ago.  Uses a cane at baseline, has been using a walker for the last 2 days to get around due to the increased pain.  She does look to be a little externally rotated on exam.  X-ray ordered for evaluation of fracture, dislocation, contusion.    Admission disposition: 11/26/2024  9:28 AM         Update at 9:25 AM.  X-ray unfortunately demonstrates left femoral neck fracture.  Discussed with the patient.  She will need to be admitted and be seen by orthopedics for surgical repair.        Past Medical History-tension, high cholesterol    Differential diagnosis before testing included fracture, contusion, dislocation    Co-morbidities that add to the complexity of management include: None    Testing ordered during this visit included hip x-ray    Radiographic images  I personally reviewed the radiographs and my individual interpretation shows left femoral neck fracture   I also reviewed the official reports that showed left femoral neck fracture      Discussion of management with hospitalist, orthopedics          Disposition:    Admission  I have discussed with the patient the results of test, differential diagnosis, and treatment plan. They expressed clear understanding of these instructions and agrees to the plan provided.           Medical Decision  Making      Disposition and Plan     Clinical Impression:  1. Left displaced femoral neck fracture (HCC)         Disposition:  Admit  11/26/2024  9:28 am    Follow-up:  No follow-up provider specified.        Medications Prescribed:  Current Discharge Medication List              Supplementary Documentation:         Hospital Problems       Present on Admission  Date Reviewed: 10/16/2024            ICD-10-CM Noted POA    * (Principal) Left displaced femoral neck fracture (HCC) S72.002A 11/26/2024 Unknown

## 2024-11-26 NOTE — H&P
[facility]     Monica Nieto Patient Status:  Inpatient    1945 MRN [id number]   Location [facility] 3SW-A Attending Laura Perez MD   Hosp Day # 0 PCP Rosette Ivy MD     Chief Complaint: Left hip pain     Subjective:    History of Present Illness:     Monica Nieto is a 79 year old female with  h/o HTN, Thyroid dx. Patient is presenting with LEft hip pain- she  fell on Saturday onto her left side and since that has had increasing pain. She finally came to ER as she could no longer bear weight.     History/Other:    Past Medical History:  Past Medical History:    Abnormal Pap smear of cervix    Acute blood loss anemia    Acute, but ill-defined, cerebrovascular disease    Tia    Allergic rhinitis    Arrhythmia    Tachycardia    Arthritis    left shoulder- had xray    Back problem    Occas. pain    Calculus of kidney    Cancer (HCC)    Basel cell    Cataract    Cervical spondylosis    KATIANA I (cervical intraepithelial neoplasia I)    Actually KATIANA 1-2, , and KIRK 1    Complex endometrial hyperplasia with atypia    Disorder of thyroid    Esophageal reflux    Essential hypertension    Hemorrhoids    High blood pressure    High cholesterol    Hx of motion sickness    Hyperlipidemia    Hyperthyroidism    Hypothyroidism    Irregular bowel habits    Nail fungus    Onychomycosis Right foot, great toe     (normal spontaneous vaginal delivery) (HCC)    OAB (overactive bladder)    Obesity    Osteoarthritis    Left shoulder bursitis    Peripheral vascular disease (HCC)    PMB (postmenopausal bleeding)    Stroke, lacunar (HCC)    TIA & Lacunar Infarct - 2016 Mild plaque/no stenosis carotid arteries    Stroke, lacunar (HCC)    Visual impairment    glasses     Past Surgical History:   Past Surgical History:   Procedure Laterality Date    Benign biopsy right      Cataract      Both eyes    Colonoscopy N/A 2021    Procedure: COLONOSCOPY;  Surgeon: Jose F Randolph MD;   Location:  ENDOSCOPY    Colonoscopy      D & c      Dilation/curettage,diagnostic  2022    Abnormal vaginal bleeding    Hysterectomy      Leep      Needle biopsy right      bening 20 years ago      /65    Oophorectomy Bilateral 2022    at time of hyst    Salpingectomy Bilateral 2022    at time of hyst     Total abdominal hysterectomy  2022    TOMASA, LEELA - Dr. Millicent Edmonds       Family History:   Family History   Problem Relation Age of Onset    Other (CHF [Other]) Mother     Other (DM, [Other]) Mother     Other (arthritis [Other]) Mother     Heart Disorder Mother         Congestive heart failure    Diabetes Sister     Other (CABG [Other]) Brother     Other (CVA [Other]) Brother     Other (bladder cancer [Other]) Brother 79    Diabetes Son     Other (TB [Other]) Maternal Grandmother     Breast Cancer Niece 57    Other (MI, [Other]) Other     Other (HTN,hypercholesterolemia [Other]) Other      Social History:    reports that she quit smoking about 49 years ago. Her smoking use included cigarettes. She started smoking about 59 years ago. She has a 2.5 pack-year smoking history. She has been exposed to tobacco smoke. She has never used smokeless tobacco. She reports current alcohol use of about 2.0 standard drinks of alcohol per week. She reports that she does not use drugs.     Allergies: Allergies[1]    Medications:  Medications Ordered Prior to Encounter[2]    Review of Systems:   A comprehensive review of systems was completed.    Pertinent positives and negatives noted in the HPI.    Objective:   Physical Exam:    BP (!) 171/64 (BP Location: Left arm)   Pulse 64   Temp 97.9 °F (36.6 °C) (Oral)   Resp 15   Ht 5' 6\" (1.676 m)   Wt 195 lb (88.5 kg)   SpO2 93%   BMI 31.47 kg/m²   General: No acute distress, Alert  Respiratory: No rhonchi, no wheezes  Cardiovascular: S1, S2. Regular rate and rhythm  Abdomen: Soft, Non-tender, non-distended, positive bowel sounds  Neuro: No new  focal deficits  Extremities: No edema      Results:    Labs:      Labs Last 24 Hours:    Recent Labs   Lab 11/26/24  0946   RBC 3.90   HGB 11.7*   HCT 34.7*   MCV 89.0   MCH 30.0   MCHC 33.7   RDW 12.5   NEPRELIM 5.50   WBC 8.4   .0       Recent Labs   Lab 11/26/24  0946   *   BUN 19   CREATSERUM 0.88   EGFRCR 66   CA 9.0   ALB 3.8      K 4.2      CO2 29.0   ALKPHO 90   AST 22   ALT 16   BILT 0.8   TP 6.0       Lab Results   Component Value Date    INR 1.06 11/26/2024    INR 0.98 03/12/2022    INR 0.96 11/15/2015       No results for input(s): \"TROP\", \"TROPHS\", \"CK\" in the last 168 hours.    No results for input(s): \"TROP\", \"PBNP\" in the last 168 hours.    No results for input(s): \"PCT\" in the last 168 hours.    Imaging: Imaging data reviewed in Epic.    Assessment & Plan:      #LEft hip pain 2/2 fx  S/p nerve block in ER  To OR tomorrow- medically cleared  PT/OT  #Hypertension  Continue home meds  #Hypothyroid   #possible UTI  Await Cx, CTX 11/26    Plan of care discussed with pt     Laura Perez MD    Supplementary Documentation:     The 21st Century Cures Act makes medical notes like these available to patients in the interest of transparency. Please be advised this is a medical document. Medical documents are intended to carry relevant information, facts as evident, and the clinical opinion of the practitioner. The medical note is intended as peer to peer communication and may appear blunt or direct. It is written in medical language and may contain abbreviations or verbiage that are unfamiliar.               **Certification      PHYSICIAN Certification of Need for Inpatient Hospitalization -     Patient will require inpatient services that will reasonably be expected to span two midnight's based on the clinical documentation in H+P.   Based on patients current state of illness, I anticipate that, after discharge, patient will require TBD.                      [1]   Allergies  Allergen  Reactions    Cholestatin ITCHING and Runny nose    Cyclobenzaprine RASH     Flushing and chest rash which she gets from various meds and other triggers    Seasonal Runny nose and ITCHING   [2]   No current facility-administered medications on file prior to encounter.     Current Outpatient Medications on File Prior to Encounter   Medication Sig Dispense Refill    MAGNESIUM OR Take 1 tablet by mouth daily.      Multiple Vitamin (MULTI-VITAMIN) Oral Tab Take 1 tablet by mouth daily.      Cod Liver Oil (COD LIVER OR) Take 1 tablet by mouth daily.      CALCIUM OR Take 1 tablet by mouth daily.      Cholecalciferol (VITAMIN D-3 OR) Take 1 tablet by mouth daily.      lisinopril 5 MG Oral Tab Take 1 tablet (5 mg total) by mouth daily. 90 tablet 1    metoprolol tartrate 25 MG Oral Tab TAKE 1/2 TABLET(12.5 MG) BY MOUTH TWICE DAILY 90 tablet 1    atorvastatin 40 MG Oral Tab Take 1 tablet (40 mg total) by mouth nightly. 90 tablet 0    levothyroxine 50 MCG Oral Tab Take 1 tablet (50 mcg total) by mouth before breakfast. 90 tablet 2    diclofenac 1 % External Gel Apply 2 g topically 2 (two) times daily as needed. 50 g 0    Naproxen Sodium 220 MG Oral Cap Take 440 mg by mouth every 6 (six) hours as needed.      aspirin 325 MG Oral Tab Take 1 tablet (325 mg total) by mouth daily.

## 2024-11-26 NOTE — ANESTHESIA PROCEDURE NOTES
Regional Block    Date/Time: 11/26/2024 10:16 AM    Performed by: Michael Kwong MD  Authorized by: Michael Kwong MD      General Information and Staff    Start Time:  11/26/2024 10:16 AM  End Time:  11/26/2024 10:21 AM  Anesthesiologist:  Michael Kwong MD  Performed by:  Anesthesiologist  Patient Location:  ED      Site Identification: real time ultrasound guided and image stored and retrievable    Block site/laterality marked before start: site marked  Reason for Block: procedure for pain and at request of ED Physician    Preanesthetic Checklist: 2 patient identifers, IV checked, site marked, risks and benefits discussed, monitors and equipment checked, pre-op evaluation, timeout performed, anesthesia consent, sterile technique used, no prohibitive neurological deficits and no local skin infection at insertion site      Procedure Details    Patient Position:  Supine  Prep: ChloraPrep    Monitoring:  Cardiac monitor, continuous pulse ox and blood pressure cuff  Block Type:  Femoral  Laterality:  Left  Injection Technique:  Single-shot    Needle    Needle Type:  Short-bevel and echogenic  Needle Gauge:  21 G  Needle Length:  110 mm  Needle Localization:  Ultrasound guidance  Reason for Ultrasound Use: appropriate spread of the medication was noted in real time and no ultrasound evidence of intravascular and/or intraneural injection            Assessment    Injection Assessment:  Good spread noted, negative aspiration for heme, negative resistance, no pain on injection, incremental injection and local visualized surrounding nerve on ultrasound  Heart Rate Change: No    - Patient tolerated block procedure well without evidence of immediate block related complications.     Medications  11/26/2024 10:16 AM      Additional Comments    Bupivicaine 0.375% 25ml + dexamethasone PF 2mg

## 2024-11-26 NOTE — ED QUICK NOTES
Orders for admission, patient is aware of plan and ready to go upstairs. Any questions, please call ED RN Moon at extension [phone].     Patient Covid vaccination status: Fully vaccinated     COVID Test Ordered in ED: None    COVID Suspicion at Admission: N/A    Running Infusions:  None    Mental Status/LOC at time of transport: A/Ox4      Other pertinent information:   CIWA score: N/A   NIH score:  N/A

## 2024-11-27 ENCOUNTER — APPOINTMENT (OUTPATIENT)
Dept: GENERAL RADIOLOGY | Facility: HOSPITAL | Age: 79
DRG: 522 | End: 2024-11-27
Attending: ORTHOPAEDIC SURGERY
Payer: MEDICARE

## 2024-11-27 ENCOUNTER — ANESTHESIA (OUTPATIENT)
Dept: SURGERY | Facility: HOSPITAL | Age: 79
DRG: 522 | End: 2024-11-27
Payer: MEDICARE

## 2024-11-27 LAB
ANION GAP SERPL CALC-SCNC: 6 MMOL/L (ref 0–18)
BASOPHILS # BLD AUTO: 0.03 X10(3) UL (ref 0–0.2)
BASOPHILS NFR BLD AUTO: 0.3 %
BUN BLD-MCNC: 32 MG/DL (ref 9–23)
CALCIUM BLD-MCNC: 8.3 MG/DL (ref 8.7–10.4)
CHLORIDE SERPL-SCNC: 109 MMOL/L (ref 98–112)
CO2 SERPL-SCNC: 27 MMOL/L (ref 21–32)
CREAT BLD-MCNC: 0.81 MG/DL
EGFRCR SERPLBLD CKD-EPI 2021: 73 ML/MIN/1.73M2 (ref 60–?)
EOSINOPHIL # BLD AUTO: 0.18 X10(3) UL (ref 0–0.7)
EOSINOPHIL NFR BLD AUTO: 1.7 %
ERYTHROCYTE [DISTWIDTH] IN BLOOD BY AUTOMATED COUNT: 12.5 %
GLUCOSE BLD-MCNC: 112 MG/DL (ref 70–99)
HCT VFR BLD AUTO: 31.5 %
HGB BLD-MCNC: 10.3 G/DL
IMM GRANULOCYTES # BLD AUTO: 0.03 X10(3) UL (ref 0–1)
IMM GRANULOCYTES NFR BLD: 0.3 %
LYMPHOCYTES # BLD AUTO: 2.64 X10(3) UL (ref 1–4)
LYMPHOCYTES NFR BLD AUTO: 24.7 %
MAGNESIUM SERPL-MCNC: 1.9 MG/DL (ref 1.6–2.6)
MCH RBC QN AUTO: 29.8 PG (ref 26–34)
MCHC RBC AUTO-ENTMCNC: 32.7 G/DL (ref 31–37)
MCV RBC AUTO: 91 FL
MONOCYTES # BLD AUTO: 1.36 X10(3) UL (ref 0.1–1)
MONOCYTES NFR BLD AUTO: 12.7 %
NEUTROPHILS # BLD AUTO: 6.45 X10 (3) UL (ref 1.5–7.7)
NEUTROPHILS # BLD AUTO: 6.45 X10(3) UL (ref 1.5–7.7)
NEUTROPHILS NFR BLD AUTO: 60.3 %
OSMOLALITY SERPL CALC.SUM OF ELEC: 302 MOSM/KG (ref 275–295)
PLATELET # BLD AUTO: 207 10(3)UL (ref 150–450)
POTASSIUM SERPL-SCNC: 4.4 MMOL/L (ref 3.5–5.1)
RBC # BLD AUTO: 3.46 X10(6)UL
SODIUM SERPL-SCNC: 142 MMOL/L (ref 136–145)
WBC # BLD AUTO: 10.7 X10(3) UL (ref 4–11)

## 2024-11-27 PROCEDURE — 3074F SYST BP LT 130 MM HG: CPT | Performed by: HOSPITALIST

## 2024-11-27 PROCEDURE — 73501 X-RAY EXAM HIP UNI 1 VIEW: CPT | Performed by: ORTHOPAEDIC SURGERY

## 2024-11-27 PROCEDURE — 3078F DIAST BP <80 MM HG: CPT | Performed by: HOSPITALIST

## 2024-11-27 PROCEDURE — 3008F BODY MASS INDEX DOCD: CPT | Performed by: HOSPITALIST

## 2024-11-27 PROCEDURE — 99232 SBSQ HOSP IP/OBS MODERATE 35: CPT | Performed by: HOSPITALIST

## 2024-11-27 PROCEDURE — 1170F FXNL STATUS ASSESSED: CPT | Performed by: HOSPITALIST

## 2024-11-27 PROCEDURE — 0SRS019 REPLACEMENT OF LEFT HIP JOINT, FEMORAL SURFACE WITH METAL SYNTHETIC SUBSTITUTE, CEMENTED, OPEN APPROACH: ICD-10-PCS | Performed by: ORTHOPAEDIC SURGERY

## 2024-11-27 PROCEDURE — 1159F MED LIST DOCD IN RCRD: CPT | Performed by: HOSPITALIST

## 2024-11-27 DEVICE — AVENIR® STANDARD STEM CEMENTED 4
Type: IMPLANTABLE DEVICE | Site: HIP | Status: FUNCTIONAL
Brand: AVENIR®

## 2024-11-27 DEVICE — IMPLANTABLE DEVICE
Type: IMPLANTABLE DEVICE | Site: HIP | Status: FUNCTIONAL
Brand: BIOMET® BONE CEMENT R

## 2024-11-27 DEVICE — IMPLANTABLE DEVICE
Type: IMPLANTABLE DEVICE | Site: HIP | Status: FUNCTIONAL
Brand: RINGLOC BI-POLAR HIP SYSTEM

## 2024-11-27 DEVICE — BONE PREPARATION KIT
Type: IMPLANTABLE DEVICE | Site: HIP | Status: FUNCTIONAL
Brand: BIOPREP

## 2024-11-27 DEVICE — IMPLANTABLE DEVICE: Type: IMPLANTABLE DEVICE | Site: HIP | Status: FUNCTIONAL

## 2024-11-27 RX ORDER — GLYCOPYRROLATE 0.2 MG/ML
INJECTION, SOLUTION INTRAMUSCULAR; INTRAVENOUS AS NEEDED
Status: DISCONTINUED | OUTPATIENT
Start: 2024-11-27 | End: 2024-11-27 | Stop reason: SURG

## 2024-11-27 RX ORDER — NICOTINE POLACRILEX 4 MG
15 LOZENGE BUCCAL
Status: DISCONTINUED | OUTPATIENT
Start: 2024-11-27 | End: 2024-11-27 | Stop reason: HOSPADM

## 2024-11-27 RX ORDER — LIDOCAINE HYDROCHLORIDE 10 MG/ML
INJECTION, SOLUTION EPIDURAL; INFILTRATION; INTRACAUDAL; PERINEURAL AS NEEDED
Status: DISCONTINUED | OUTPATIENT
Start: 2024-11-27 | End: 2024-11-27 | Stop reason: SURG

## 2024-11-27 RX ORDER — NALOXONE HYDROCHLORIDE 0.4 MG/ML
0.08 INJECTION, SOLUTION INTRAMUSCULAR; INTRAVENOUS; SUBCUTANEOUS AS NEEDED
Status: DISCONTINUED | OUTPATIENT
Start: 2024-11-27 | End: 2024-11-27 | Stop reason: HOSPADM

## 2024-11-27 RX ORDER — ESMOLOL HYDROCHLORIDE 10 MG/ML
INJECTION INTRAVENOUS AS NEEDED
Status: DISCONTINUED | OUTPATIENT
Start: 2024-11-27 | End: 2024-11-27 | Stop reason: SURG

## 2024-11-27 RX ORDER — HYDROMORPHONE HYDROCHLORIDE 1 MG/ML
0.6 INJECTION, SOLUTION INTRAMUSCULAR; INTRAVENOUS; SUBCUTANEOUS EVERY 5 MIN PRN
Status: DISCONTINUED | OUTPATIENT
Start: 2024-11-27 | End: 2024-11-27 | Stop reason: HOSPADM

## 2024-11-27 RX ORDER — NICOTINE POLACRILEX 4 MG
30 LOZENGE BUCCAL
Status: DISCONTINUED | OUTPATIENT
Start: 2024-11-27 | End: 2024-11-27 | Stop reason: HOSPADM

## 2024-11-27 RX ORDER — EPHEDRINE SULFATE 50 MG/ML
INJECTION INTRAVENOUS AS NEEDED
Status: DISCONTINUED | OUTPATIENT
Start: 2024-11-27 | End: 2024-11-27 | Stop reason: SURG

## 2024-11-27 RX ORDER — MEPERIDINE HYDROCHLORIDE 25 MG/ML
12.5 INJECTION INTRAMUSCULAR; INTRAVENOUS; SUBCUTANEOUS AS NEEDED
Status: DISCONTINUED | OUTPATIENT
Start: 2024-11-27 | End: 2024-11-27 | Stop reason: HOSPADM

## 2024-11-27 RX ORDER — HYDROMORPHONE HYDROCHLORIDE 1 MG/ML
0.2 INJECTION, SOLUTION INTRAMUSCULAR; INTRAVENOUS; SUBCUTANEOUS EVERY 5 MIN PRN
Status: DISCONTINUED | OUTPATIENT
Start: 2024-11-27 | End: 2024-11-27 | Stop reason: HOSPADM

## 2024-11-27 RX ORDER — HYDROMORPHONE HYDROCHLORIDE 1 MG/ML
0.2 INJECTION, SOLUTION INTRAMUSCULAR; INTRAVENOUS; SUBCUTANEOUS EVERY 2 HOUR PRN
Status: DISCONTINUED | OUTPATIENT
Start: 2024-11-27 | End: 2024-11-29

## 2024-11-27 RX ORDER — NEOSTIGMINE METHYLSULFATE 1 MG/ML
INJECTION INTRAVENOUS AS NEEDED
Status: DISCONTINUED | OUTPATIENT
Start: 2024-11-27 | End: 2024-11-27 | Stop reason: SURG

## 2024-11-27 RX ORDER — SODIUM CHLORIDE, SODIUM LACTATE, POTASSIUM CHLORIDE, CALCIUM CHLORIDE 600; 310; 30; 20 MG/100ML; MG/100ML; MG/100ML; MG/100ML
INJECTION, SOLUTION INTRAVENOUS CONTINUOUS
Status: DISCONTINUED | OUTPATIENT
Start: 2024-11-27 | End: 2024-11-27 | Stop reason: HOSPADM

## 2024-11-27 RX ORDER — ONDANSETRON 2 MG/ML
4 INJECTION INTRAMUSCULAR; INTRAVENOUS EVERY 6 HOURS PRN
Status: DISCONTINUED | OUTPATIENT
Start: 2024-11-27 | End: 2024-11-27 | Stop reason: HOSPADM

## 2024-11-27 RX ORDER — TRANEXAMIC ACID 10 MG/ML
INJECTION, SOLUTION INTRAVENOUS AS NEEDED
Status: DISCONTINUED | OUTPATIENT
Start: 2024-11-27 | End: 2024-11-27 | Stop reason: SURG

## 2024-11-27 RX ORDER — DEXAMETHASONE SODIUM PHOSPHATE 4 MG/ML
VIAL (ML) INJECTION AS NEEDED
Status: DISCONTINUED | OUTPATIENT
Start: 2024-11-27 | End: 2024-11-27 | Stop reason: SURG

## 2024-11-27 RX ORDER — HYDROCODONE BITARTRATE AND ACETAMINOPHEN 5; 325 MG/1; MG/1
1 TABLET ORAL ONCE AS NEEDED
Status: DISCONTINUED | OUTPATIENT
Start: 2024-11-27 | End: 2024-11-27 | Stop reason: HOSPADM

## 2024-11-27 RX ORDER — HYDROCODONE BITARTRATE AND ACETAMINOPHEN 5; 325 MG/1; MG/1
2 TABLET ORAL ONCE AS NEEDED
Status: DISCONTINUED | OUTPATIENT
Start: 2024-11-27 | End: 2024-11-27 | Stop reason: HOSPADM

## 2024-11-27 RX ORDER — KETOROLAC TROMETHAMINE 30 MG/ML
INJECTION, SOLUTION INTRAMUSCULAR; INTRAVENOUS AS NEEDED
Status: DISCONTINUED | OUTPATIENT
Start: 2024-11-27 | End: 2024-11-27 | Stop reason: SURG

## 2024-11-27 RX ORDER — DEXTROSE MONOHYDRATE 25 G/50ML
50 INJECTION, SOLUTION INTRAVENOUS
Status: DISCONTINUED | OUTPATIENT
Start: 2024-11-27 | End: 2024-11-27 | Stop reason: HOSPADM

## 2024-11-27 RX ORDER — HYDROMORPHONE HYDROCHLORIDE 1 MG/ML
0.4 INJECTION, SOLUTION INTRAMUSCULAR; INTRAVENOUS; SUBCUTANEOUS EVERY 5 MIN PRN
Status: DISCONTINUED | OUTPATIENT
Start: 2024-11-27 | End: 2024-11-27 | Stop reason: HOSPADM

## 2024-11-27 RX ORDER — CEFAZOLIN SODIUM 1 G/3ML
INJECTION, POWDER, FOR SOLUTION INTRAMUSCULAR; INTRAVENOUS AS NEEDED
Status: DISCONTINUED | OUTPATIENT
Start: 2024-11-27 | End: 2024-11-27 | Stop reason: SURG

## 2024-11-27 RX ORDER — ROCURONIUM BROMIDE 10 MG/ML
INJECTION, SOLUTION INTRAVENOUS AS NEEDED
Status: DISCONTINUED | OUTPATIENT
Start: 2024-11-27 | End: 2024-11-27 | Stop reason: SURG

## 2024-11-27 RX ORDER — OXYCODONE HYDROCHLORIDE 5 MG/1
5 TABLET ORAL EVERY 4 HOURS PRN
Status: DISCONTINUED | OUTPATIENT
Start: 2024-11-27 | End: 2024-11-29

## 2024-11-27 RX ORDER — KETOROLAC TROMETHAMINE 15 MG/ML
15 INJECTION, SOLUTION INTRAMUSCULAR; INTRAVENOUS EVERY 6 HOURS
Status: COMPLETED | OUTPATIENT
Start: 2024-11-27 | End: 2024-11-28

## 2024-11-27 RX ORDER — SODIUM CHLORIDE 9 MG/ML
INJECTION, SOLUTION INTRAVENOUS CONTINUOUS PRN
Status: DISCONTINUED | OUTPATIENT
Start: 2024-11-27 | End: 2024-11-27 | Stop reason: SURG

## 2024-11-27 RX ORDER — METOCLOPRAMIDE HYDROCHLORIDE 5 MG/ML
5 INJECTION INTRAMUSCULAR; INTRAVENOUS EVERY 8 HOURS PRN
Status: DISCONTINUED | OUTPATIENT
Start: 2024-11-27 | End: 2024-11-27 | Stop reason: HOSPADM

## 2024-11-27 RX ORDER — HYDROMORPHONE HYDROCHLORIDE 1 MG/ML
0.4 INJECTION, SOLUTION INTRAMUSCULAR; INTRAVENOUS; SUBCUTANEOUS EVERY 2 HOUR PRN
Status: DISCONTINUED | OUTPATIENT
Start: 2024-11-27 | End: 2024-11-29

## 2024-11-27 RX ORDER — LABETALOL HYDROCHLORIDE 5 MG/ML
5 INJECTION, SOLUTION INTRAVENOUS EVERY 5 MIN PRN
Status: DISCONTINUED | OUTPATIENT
Start: 2024-11-27 | End: 2024-11-27 | Stop reason: HOSPADM

## 2024-11-27 RX ORDER — ACETAMINOPHEN 500 MG
1000 TABLET ORAL EVERY 8 HOURS SCHEDULED
Status: DISCONTINUED | OUTPATIENT
Start: 2024-11-27 | End: 2024-11-29

## 2024-11-27 RX ORDER — ONDANSETRON 2 MG/ML
INJECTION INTRAMUSCULAR; INTRAVENOUS AS NEEDED
Status: DISCONTINUED | OUTPATIENT
Start: 2024-11-27 | End: 2024-11-27 | Stop reason: SURG

## 2024-11-27 RX ORDER — ACETAMINOPHEN 500 MG
1000 TABLET ORAL ONCE AS NEEDED
Status: DISCONTINUED | OUTPATIENT
Start: 2024-11-27 | End: 2024-11-27 | Stop reason: HOSPADM

## 2024-11-27 RX ORDER — NAPROXEN 500 MG/1
500 TABLET ORAL 2 TIMES DAILY WITH MEALS
Status: DISCONTINUED | OUTPATIENT
Start: 2024-11-28 | End: 2024-11-29

## 2024-11-27 RX ORDER — ASPIRIN 81 MG/1
81 TABLET ORAL 2 TIMES DAILY
Status: DISCONTINUED | OUTPATIENT
Start: 2024-11-27 | End: 2024-11-29

## 2024-11-27 RX ORDER — HYDROMORPHONE HYDROCHLORIDE 1 MG/ML
INJECTION, SOLUTION INTRAMUSCULAR; INTRAVENOUS; SUBCUTANEOUS
Status: COMPLETED
Start: 2024-11-27 | End: 2024-11-27

## 2024-11-27 RX ADMIN — GLYCOPYRROLATE 0.8 MG: 0.2 INJECTION, SOLUTION INTRAMUSCULAR; INTRAVENOUS at 16:21:00

## 2024-11-27 RX ADMIN — KETOROLAC TROMETHAMINE 30 MG: 30 INJECTION, SOLUTION INTRAMUSCULAR; INTRAVENOUS at 16:21:00

## 2024-11-27 RX ADMIN — TRANEXAMIC ACID 1000 MG: 10 INJECTION, SOLUTION INTRAVENOUS at 14:56:00

## 2024-11-27 RX ADMIN — NEOSTIGMINE METHYLSULFATE 4 MG: 1 INJECTION INTRAVENOUS at 16:21:00

## 2024-11-27 RX ADMIN — EPHEDRINE SULFATE 10 MG: 50 INJECTION INTRAVENOUS at 16:17:00

## 2024-11-27 RX ADMIN — CEFAZOLIN SODIUM 2 G: 1 INJECTION, POWDER, FOR SOLUTION INTRAMUSCULAR; INTRAVENOUS at 14:52:00

## 2024-11-27 RX ADMIN — ONDANSETRON 4 MG: 2 INJECTION INTRAMUSCULAR; INTRAVENOUS at 16:21:00

## 2024-11-27 RX ADMIN — ROCURONIUM BROMIDE 50 MG: 10 INJECTION, SOLUTION INTRAVENOUS at 14:29:00

## 2024-11-27 RX ADMIN — ESMOLOL HYDROCHLORIDE 50 MG: 10 INJECTION INTRAVENOUS at 14:26:00

## 2024-11-27 RX ADMIN — SODIUM CHLORIDE: 9 INJECTION, SOLUTION INTRAVENOUS at 16:08:00

## 2024-11-27 RX ADMIN — LIDOCAINE HYDROCHLORIDE 25 MG: 10 INJECTION, SOLUTION EPIDURAL; INFILTRATION; INTRACAUDAL; PERINEURAL at 14:29:00

## 2024-11-27 RX ADMIN — SODIUM CHLORIDE: 9 INJECTION, SOLUTION INTRAVENOUS at 14:21:00

## 2024-11-27 RX ADMIN — SODIUM CHLORIDE: 9 INJECTION, SOLUTION INTRAVENOUS at 14:43:00

## 2024-11-27 RX ADMIN — DEXAMETHASONE SODIUM PHOSPHATE 4 MG: 4 MG/ML VIAL (ML) INJECTION at 14:44:00

## 2024-11-27 RX ADMIN — ROCURONIUM BROMIDE 20 MG: 10 INJECTION, SOLUTION INTRAVENOUS at 15:40:00

## 2024-11-27 RX ADMIN — EPHEDRINE SULFATE 10 MG: 50 INJECTION INTRAVENOUS at 16:05:00

## 2024-11-27 NOTE — ANESTHESIA PROCEDURE NOTES
Airway  Date/Time: 11/27/2024 2:33 PM  Urgency: elective      General Information and Staff    Patient location during procedure: OR  Anesthesiologist: Marvin Garcia MD  Performed: anesthesiologist   Performed by: Marvin Garcia MD  Authorized by: Marvin Garcia MD      Indications and Patient Condition  Indications for airway management: anesthesia  Spontaneous Ventilation: absent  Sedation level: deep  Preoxygenated: yes  Patient position: sniffing  Mask difficulty assessment: 1 - vent by mask    Final Airway Details  Final airway type: endotracheal airway      Successful airway: ETT  Cuffed: yes   Successful intubation technique: direct laryngoscopy  Facilitating devices/methods: intubating stylet  Endotracheal tube insertion site: oral  Blade: Gretel  Blade size: #3  ETT size (mm): 7.0    Cormack-Lehane Classification: grade IIB - view of arytenoids or posterior of glottis only  Placement verified by: capnometry   Measured from: teeth  ETT to teeth (cm): 22  Number of attempts at approach: 1    Additional Comments  Dentition unchanged after DL and intubation, atraumatic procedure.

## 2024-11-27 NOTE — ANESTHESIA POSTPROCEDURE EVALUATION
[facility]    Monica Nieto Patient Status:  Inpatient   Age/Gender 79 year old female MRN [id number]   Location [facility] SURGERY Attending Marlene Shah MD   Hosp Day # 1 PCP Rosette Ivy MD       Anesthesia Post-op Note    LEFT HIP HEMIARTHROPLASTY    Procedure Summary       Date: 11/27/24 Room / Location:  MAIN OR  /  MAIN OR    Anesthesia Start: 1421 Anesthesia Stop:     Procedure: LEFT HIP HEMIARTHROPLASTY (Left: Hip) Diagnosis: (LEFT FEMORAL NECK FRACTURE)    Surgeons: Valdez Anna MD Anesthesiologist: Marvin Garcia MD    Anesthesia Type: general ASA Status: 3            Anesthesia Type: general    Vitals Value Taken Time   /71 11/27/24 1646   Temp 97.3 11/27/24 1646   Pulse 88 11/27/24 1646   Resp 16 11/27/24 1646   SpO2 100 11/27/24 1646       Patient Location: PACU    Anesthesia Type: general    Airway Patency: patent    Postop Pain Control: adequate    Mental Status: preanesthetic baseline    Nausea/Vomiting: none    Cardiopulmonary/Hydration status: stable euvolemic    Complications: no apparent anesthesia related complications    Postop vital signs: stable    Dental Exam: Unchanged from Preop    Patient to be discharged from PACU when criteria met.

## 2024-11-27 NOTE — PLAN OF CARE
Recd pt axox4.  Pt denies pain or distress at this time. +3 pulses to bilateral pedals.  No deformity noted.  Pt denies pain at rest.  Medicated per MAR.  External urinary catheter in place.  Pt spoke with Dr. Anna r/t upcoming surgery.  Scds on BLE,  pt up to date on plan of care and NPO status at [facility].  Call light within reach, bed in lowest position

## 2024-11-27 NOTE — PAYOR COMM NOTE
--------------  ADMISSION REVIEW     Payor: [employer] MED[employer] ADV PPO  Subscriber #:  [id number]  Authorization Number: [id number]    Admit date: 24  Admit time: 11:26 AM       REVIEW DOCUMENTATION:     ED Provider Notes        ED Provider Notes signed by North Welsh MD at 2024  9:29 AM        Chief Complaint   Patient presents with    Fall     Stated Complaint: Left hip pain from fall on Saturday    Subjective:   HPI      This is an 79-year-old female presenting by ambulance with persistent left hip pain since a fall 3 days ago.  She was at a friend's house, missed 1 step and fell down that step, landing directly on the hip.  Did not strike her head or injure anything else.  Has had pain there since.  At baseline she uses a cane to get around, has been using a walker for the last 2 days, still able to get around but it is very sore.  She was up on it a lot more yesterday and it was very sore last night.    Objective:     Past Medical History:    Abnormal Pap smear of cervix    Acute blood loss anemia    Acute, but ill-defined, cerebrovascular disease    Tia    Allergic rhinitis    Arrhythmia    Tachycardia    Arthritis    left shoulder- had xray    Back problem    Occas. pain    Calculus of kidney    Cancer (HCC)    Basel cell    Cataract    Cervical spondylosis    KATIANA I (cervical intraepithelial neoplasia I)    Actually KATIANA 1-2, , and KIRK 1    Complex endometrial hyperplasia with atypia    Disorder of thyroid    Esophageal reflux    Essential hypertension    Hemorrhoids    High blood pressure    High cholesterol    Hx of motion sickness    Hyperlipidemia    Hyperthyroidism    Hypothyroidism    Irregular bowel habits    Nail fungus    Onychomycosis Right foot, great toe     (normal spontaneous vaginal delivery) (HCC)    OAB (overactive bladder)    Obesity    Osteoarthritis    Left shoulder bursitis    Peripheral vascular disease (HCC)    PMB (postmenopausal bleeding)    Stroke, lacunar (HCC)     TIA & Lacunar Infarct - 2016 Mild plaque/no stenosis carotid arteries    Stroke, lacunar (HCC)    Visual impairment    glasses       Past Surgical History:   Procedure Laterality Date    Benign biopsy right      Cataract  2005    Both eyes    Colonoscopy N/A 2021    Procedure: COLONOSCOPY;  Surgeon: Jose F Randolph MD;  Location:  ENDOSCOPY    Colonoscopy      D & c      Dilation/curettage,diagnostic  2022    Abnormal vaginal bleeding    Hysterectomy      Leep      Needle biopsy right      bening 20 years ago      1963/65    Oophorectomy Bilateral 2022    at time of hyst    Salpingectomy Bilateral 2022    at time of hyst     Total abdominal hysterectomy  2022    [facility] - Dr. Millicent Edmonds        ED Triage Vitals [24 0836]   BP (!) 165/58   Pulse 72   Resp 20   Temp 97.8 °F (36.6 °C)   Temp src Oral   SpO2 98 %   O2 Device None (Room air)       Current Vitals:   Vital Signs  BP: (!) 165/58  Pulse: 66  Resp: 20  Temp: 97.8 °F (36.6 °C)  Temp src: Oral  MAP (mmHg): 89    Oxygen Therapy  SpO2: 100 %  O2 Device: None (Room air)        Physical Exam  Vitals and nursing note reviewed.   Constitutional:       Appearance: She is well-developed.   HENT:      Head: Normocephalic and atraumatic.   Eyes:      Conjunctiva/sclera: Conjunctivae normal.      Pupils: Pupils are equal, round, and reactive to light.   Cardiovascular:      Rate and Rhythm: Normal rate and regular rhythm.      Heart sounds: Normal heart sounds.   Pulmonary:      Effort: Pulmonary effort is normal.      Breath sounds: Normal breath sounds.   Abdominal:      General: Bowel sounds are normal.      Palpations: Abdomen is soft.   Musculoskeletal:         General: Normal range of motion.      Cervical back: Normal range of motion and neck supple.      Comments: There is diffuse tenderness to palpation around the left hip.  There does look to be a little external rotation of the left lower extremity.  No  obvious shortening.  No tenderness in the knee or ankle.   Skin:     General: Skin is warm and dry.   Neurological:      Mental Status: She is alert and oriented to person, place, and time.       EKG    Rate, intervals and axes as noted on EKG Report.  Rate: 64  Rhythm: Sinus Rhythm  Reading: Sinus rhythm.  Left axis deviation.  No ST segment or T wave changes.  No old immediately available for comparison.    XR HIP W OR WO PELVIS 2 OR 3 VIEWS, LEFT (CPT=73502)    Result Date: 11/26/2024  PROCEDURE:  XR HIP W OR WO PELVIS 2 OR 3 VIEWS, LEFT (CPT=73502)  TECHNIQUE:  Unilateral 2 to 3 views of the hip and pelvis if performed.  COMPARISON:  None.  INDICATIONS:  Status post fall with left hip injury, pain  PATIENT STATED HISTORY: (As transcribed by Technologist)  Patient states fall on Saturday and having pain to left hip.    FINDINGS:  There is a left femoral neck fracture with minimal superolateral displacement of the distal fracture fragment by 6 mm.  No additional osseous injuries.  Diffuse osseous demineralization noted.  Mild bilateral hip arthropathy.  Mild bilateral SI joint arthropathy.            CONCLUSION:  Left femoral neck fracture with minimal superolateral displacement of the distal fracture fragment by 6 mm.  Diffuse osseous demineralization suspicious for osteopenia versus osteoporosis.  Mild bilateral hip arthropathy.  Mild bilateral SI joint arthropathy.    LOCATION:  [facility]   Dictated by (CST): Kevin Motta DO on 11/26/2024 at 9:10 AM     Finalized by (CST): Kevin Motta DO on 11/26/2024 at 9:12 AM            MDM      79-year-old female presenting with left hip pain since a fall 3 days ago.  Uses a cane at baseline, has been using a walker for the last 2 days to get around due to the increased pain.  She does look to be a little externally rotated on exam.  X-ray ordered for evaluation of fracture, dislocation, contusion.      Update at 9:25 AM.  X-ray unfortunately demonstrates left femoral neck  fracture.  Discussed with the patient.  She will need to be admitted and be seen by orthopedics for surgical repair.    Clinical Impression:  1. Left displaced femoral neck fracture (HCC)         Disposition:  Admit       Present on Admission  Date Reviewed: 10/16/2024            ICD-10-CM Noted POA    * (Principal) Left displaced femoral neck fracture (HCC) S72.002A 2024 Unknown              Signed by North Welsh MD on 2024  9:29 AM          H&P    Chief Complaint: Left hip pain         Subjective:  History of Present Illness:      Monica Nieto is a 79 year old female with  h/o HTN, Thyroid dx. Patient is presenting with LEft hip pain- she  fell on Saturday onto her left side and since that has had increasing pain. She finally came to ER as she could no longer bear weight.         History/Other:  Past Medical History:  Past Medical History       Past Medical History:    Abnormal Pap smear of cervix    Acute blood loss anemia    Acute, but ill-defined, cerebrovascular disease     Tia    Allergic rhinitis    Arrhythmia     Tachycardia    Arthritis     left shoulder- had xray    Back problem     Occas. pain    Calculus of kidney    Cancer (HCC)     Basel cell    Cataract    Cervical spondylosis    KATIANA I (cervical intraepithelial neoplasia I)     Actually KATIANA 1-2, , and KIRK 1    Complex endometrial hyperplasia with atypia    Disorder of thyroid    Esophageal reflux    Essential hypertension    Hemorrhoids    High blood pressure    High cholesterol    Hx of motion sickness    Hyperlipidemia    Hyperthyroidism    Hypothyroidism    Irregular bowel habits    Nail fungus     Onychomycosis Right foot, great toe     (normal spontaneous vaginal delivery) ([facility])    OAB (overactive bladder)    Obesity    Osteoarthritis     Left shoulder bursitis    Peripheral vascular disease (HCC)    PMB (postmenopausal bleeding)    Stroke, lacunar ([facility])     TIA & Lacunar Infarct - 2016 Mild plaque/no stenosis  carotid arteries    Stroke, lacunar (HCC)    Visual impairment     glasses        Past Surgical History:   Past Surgical History         Past Surgical History:   Procedure Laterality Date    Benign biopsy right        Cataract        Both eyes    Colonoscopy N/A 2021     Procedure: COLONOSCOPY;  Surgeon: Jose F Randolph MD;  Location:  ENDOSCOPY    Colonoscopy       D & c        Dilation/curettage,diagnostic   2022     Abnormal vaginal bleeding    Hysterectomy        Leep        Needle biopsy right         bening 20 years ago       /65    Oophorectomy Bilateral 2022     at time of hyst    Salpingectomy Bilateral 2022     at time of hyst     Total abdominal hysterectomy   2022     [facility], [facility] - Dr. Millicent Edmonds          Family History:   Family History         Family History   Problem Relation Age of Onset    Other (CHF [Other]) Mother      Other (DM, [Other]) Mother      Other (arthritis [Other]) Mother      Heart Disorder Mother           Congestive heart failure    Diabetes Sister      Other (CABG [Other]) Brother      Other (CVA [Other]) Brother      Other (bladder cancer [Other]) Brother 79    Diabetes Son      Other (TB [Other]) Maternal Grandmother      Breast Cancer Niece 57    Other (MI, [Other]) Other      Other (HTN,hypercholesterolemia [Other]) Other          Social History:    reports that she quit smoking about 49 years ago. Her smoking use included cigarettes. She started smoking about 59 years ago. She has a 2.5 pack-year smoking history. She has been exposed to tobacco smoke. She has never used smokeless tobacco. She reports current alcohol use of about 2.0 standard drinks of alcohol per week. She reports that she does not use drugs.      Allergies: [Allergies]    [Allergies]        Allergen Reactions    Cholestatin ITCHING and Runny nose    Cyclobenzaprine RASH       Flushing and chest rash which she gets from various meds and other triggers     Seasonal Runny nose and ITCHING        Medications:  [Medications Ordered Prior to Encounter]    [Medications Ordered Prior to Encounter]  No current facility-administered medications on file prior to encounter.             Current Outpatient Medications on File Prior to Encounter   Medication Sig Dispense Refill    MAGNESIUM OR Take 1 tablet by mouth daily.        Multiple Vitamin (MULTI-VITAMIN) Oral Tab Take 1 tablet by mouth daily.        Cod Liver Oil (COD LIVER OR) Take 1 tablet by mouth daily.        CALCIUM OR Take 1 tablet by mouth daily.        Cholecalciferol (VITAMIN D-3 OR) Take 1 tablet by mouth daily.        lisinopril 5 MG Oral Tab Take 1 tablet (5 mg total) by mouth daily. 90 tablet 1    metoprolol tartrate 25 MG Oral Tab TAKE 1/2 TABLET(12.5 MG) BY MOUTH TWICE DAILY 90 tablet 1    atorvastatin 40 MG Oral Tab Take 1 tablet (40 mg total) by mouth nightly. 90 tablet 0    levothyroxine 50 MCG Oral Tab Take 1 tablet (50 mcg total) by mouth before breakfast. 90 tablet 2    diclofenac 1 % External Gel Apply 2 g topically 2 (two) times daily as needed. 50 g 0    Naproxen Sodium 220 MG Oral Cap Take 440 mg by mouth every 6 (six) hours as needed.        aspirin 325 MG Oral Tab Take 1 tablet (325 mg total) by mouth daily.            Review of Systems:   A comprehensive review of systems was completed.    Pertinent positives and negatives noted in the HPI.        Objective:  Physical Exam:    BP (!) 171/64 (BP Location: Left arm)   Pulse 64   Temp 97.9 °F (36.6 °C) (Oral)   Resp 15   Ht 5' 6\" (1.676 m)   Wt 195 lb (88.5 kg)   SpO2 93%   BMI 31.47 kg/m²   General: No acute distress, Alert  Respiratory: No rhonchi, no wheezes  Cardiovascular: S1, S2. Regular rate and rhythm  Abdomen: Soft, Non-tender, non-distended, positive bowel sounds  Neuro: No new focal deficits  Extremities: No edema        Assessment & Plan:  #LEft hip pain 2/2 fx  S/p nerve block in ER  To OR tomorrow- medically  cleared  PT/OT  #Hypertension  Continue home meds  #Hypothyroid   #possible UTI  Await Cx, CTX 11/26     Plan of care discussed with pt        11/27 IM    Chief Complaint: left hip pain        Vital signs:  Temp:  [97.7 °F (36.5 °C)-98.4 °F (36.9 °C)] 98 °F (36.7 °C)  Pulse:  [53-72] 55  Resp:  [16-18] 16  BP: (145-153)/(54-63) 150/54  SpO2:  [96 %-98 %] 96 %             Assessment & Plan:  #LEft hip pain 2/2 fx  S/p nerve block in ER  To OR later today  PT/OT  #Hypertension  Continue home meds  #Hypothyroid   #possible UTI  Await Cx, CTX 11/26     Plan of care discussed with pt         Marlene Shah MD       11/27 anesthesia procedure     Signed       Expand All Collapse All    PRE-OP EVALUATION     Patient Name: Monica Nieto     Admit Diagnosis: Left displaced femoral neck fracture (HCC) [S72.002A]     Pre-op Diagnosis: LEFT FEMORAL NECK FRACTURE     LEFT HIP HEMIARTHROPLASTY     Anesthesia Procedure: LEFT HIP HEMIARTHROPLASTY (Left)     Surgeons and Role:     * Valdez Anna MD - Primary     Pre-op vitals reviewed.  Temp: 98 °F (36.7 °C)  Pulse: 55  Resp: 16  BP: 150/54  SpO2: 96 %  Body mass index is 31.47 kg/m².     Current medications reviewed.  Hospital Medications:  Current Medications    [Transfer Hold] lidocaine PF (Xylocaine-MPF) 1% injection  2 mL Injection PRN    [Transfer Hold] ropivacaine (Naropin) 0.5% injection  30 mL Injection PRN    [Transfer Hold] EPINEPHrine (Adrenalin) 1 MG/ML injection (Allergic Reaction Kit) 1 mg  1 mg Injection PRN    [Transfer Hold] sodium chloride 0.9% PF injection 10 mL  10 mL Injection PRN    [Transfer Hold] acetaminophen (Tylenol Extra Strength) tab 1,000 mg  1,000 mg Oral Q4H PRN    [Transfer Hold] melatonin tab 3 mg  3 mg Oral Nightly PRN    [Transfer Hold] glycerin-hypromellose- (Artificial Tears) 0.2-0.2-1 % ophthalmic solution 1 drop  1 drop Both Eyes QID PRN    [Transfer Hold] sodium chloride (Saline Mist) 0.65 % nasal solution 1 spray  1 spray Each Nare  Q3H PRN    [Transfer Hold] enoxaparin (Lovenox) 40 MG/0.4ML SUBQ injection 40 mg  40 mg Subcutaneous Daily    [Transfer Hold] morphINE PF 2 MG/ML injection 1 mg  1 mg Intravenous Q2H PRN     Or    [Transfer Hold] morphINE PF 2 MG/ML injection 2 mg  2 mg Intravenous Q2H PRN     Or    [Transfer Hold] morphINE PF 4 MG/ML injection 4 mg  4 mg Intravenous Q2H PRN    [Transfer Hold] polyethylene glycol (PEG 3350) (Miralax) 17 g oral packet 17 g  17 g Oral Daily PRN    [Transfer Hold] sennosides (Senokot) tab 17.2 mg  17.2 mg Oral Nightly PRN    [Transfer Hold] bisacodyl (Dulcolax) 10 MG rectal suppository 10 mg  10 mg Rectal Daily PRN    [Transfer Hold] fleet enema (Fleet) rectal enema 133 mL  1 enema Rectal Once PRN    [Transfer Hold] ondansetron (Zofran) 4 MG/2ML injection 4 mg  4 mg Intravenous Q6H PRN    [Transfer Hold] metoclopramide (Reglan) 5 mg/mL injection 5 mg  5 mg Intravenous Q8H PRN    [Transfer Hold] hydrALAzine (Apresoline) 20 mg/mL injection 5 mg  5 mg Intravenous Q6H PRN    [Transfer Hold] atorvastatin (Lipitor) tab 40 mg  40 mg Oral Nightly    [Transfer Hold] lisinopril (Prinivil; Zestril) tab 5 mg  5 mg Oral Daily    [Transfer Hold] levothyroxine (Synthroid) tab 50 mcg  50 mcg Oral Daily @ 0700    [Transfer Hold] metoprolol tartrate (Lopressor) partial tab 12.5 mg  12.5 mg Oral 2x Daily(Beta Blocker)    [Transfer Hold] oxyCODONE immediate release partial tab 2.5 mg  2.5 mg Oral Q4H PRN     Or    [Transfer Hold] oxyCODONE immediate release tab 5 mg  5 mg Oral Q4H PRN    [Transfer Hold] HYDROmorphone (Dilaudid) 1 MG/ML injection 0.2 mg  0.2 mg Intravenous Q2H PRN     Or    [Transfer Hold] HYDROmorphone (Dilaudid) 1 MG/ML injection 0.4 mg  0.4 mg Intravenous Q2H PRN    [Transfer Hold] tiZANidine (Zanaflex) partial tab 1 mg  1 mg Oral Q6H PRN    [COMPLETED] dexAMETHasone PF (Decadron) 10 MG/ML injection 8 mg  8 mg Intravenous Once    [Transfer Hold] traMADol (Ultram) tab 50 mg  50 mg Oral 4 times per day     [Transfer Hold] acetaminophen (Tylenol) tab 650 mg  650 mg Oral Q4H While awake    clonidine/epinephrine/ropivacaine/ketorolac in 0.9% NaCl 60 mL pain cocktail syringe for hip arthroplasty   Infiltration Once (Intra-Op)    cefTRIAXone (Rocephin) 1 g in sodium chloride 0.9% 100 mL IVPB-ADDV  1 g Intravenous Q24H            Outpatient Medications:   [Prescriptions Prior to Admission]    [Prescriptions Prior to Admission]          Medications Prior to Admission   Medication Sig Dispense Refill Last Dose/Taking    MAGNESIUM OR Take 1 tablet by mouth daily.     Past Week    Multiple Vitamin (MULTI-VITAMIN) Oral Tab Take 1 tablet by mouth daily.     Past Week    Cod Liver Oil (COD LIVER OR) Take 1 tablet by mouth daily.     Past Week    CALCIUM OR Take 1 tablet by mouth daily.     Past Week    Cholecalciferol (VITAMIN D-3 OR) Take 1 tablet by mouth daily.     Past Week    lisinopril 5 MG Oral Tab Take 1 tablet (5 mg total) by mouth daily. 90 tablet 1 11/25/2024 Morning    metoprolol tartrate 25 MG Oral Tab TAKE 1/2 TABLET(12.5 MG) BY MOUTH TWICE DAILY 90 tablet 1 11/25/2024 Morning    atorvastatin 40 MG Oral Tab Take 1 tablet (40 mg total) by mouth nightly. 90 tablet 0 11/25/2024 Evening    levothyroxine 50 MCG Oral Tab Take 1 tablet (50 mcg total) by mouth before breakfast. 90 tablet 2 11/25/2024 Morning    diclofenac 1 % External Gel Apply 2 g topically 2 (two) times daily as needed. 50 g 0 Past Week    Naproxen Sodium 220 MG Oral Cap Take 440 mg by mouth every 6 (six) hours as needed.     11/25/2024 Morning    aspirin 325 MG Oral Tab Take 1 tablet (325 mg total) by mouth daily.     11/23/2024        Allergies: Cholestatin, Cyclobenzaprine, and Seasonal        Anesthesia Evaluation      Patient Active Problem List   Diagnosis    Hypothyroidism    Dyslipidemia    Essential hypertension    Carotid disease, bilateral (HCC)    SVT (supraventricular tachycardia) (HCC)    Atherosclerosis of aorta (HCC)    Tortuous aorta (HCC)     History of stroke    Cervical spondylosis    Lumbar spondylosis    Type 2 diabetes mellitus with hyperglycemia, without long-term current use of insulin (HCC)    CKD (chronic kidney disease) stage 3, GFR 30-59 ml/min (HCC)    CKD stage 3 due to type 2 diabetes mellitus (HCC)    Left displaced femoral neck fracture (HCC)    Hip fracture (HCC)         Past Medical History       Past Medical History:    Abnormal Pap smear of cervix    Acute blood loss anemia    Acute, but ill-defined, cerebrovascular disease     Tia    Allergic rhinitis    Arrhythmia     Tachycardia    Arthritis     left shoulder- had xray    Back problem     Occas. pain    Calculus of kidney    Cancer (HCC)     Basel cell    Cataract    Cervical spondylosis    KATAINA I (cervical intraepithelial neoplasia I)     Actually KATIANA 1-2, , and KIRK 1    Complex endometrial hyperplasia with atypia    Disorder of thyroid    Esophageal reflux    Essential hypertension    Hemorrhoids    High blood pressure    High cholesterol    Hx of motion sickness    Hyperlipidemia    Hyperthyroidism    Hypothyroidism    Irregular bowel habits    Nail fungus     Onychomycosis Right foot, great toe     (normal spontaneous vaginal delivery) ([facility])    OAB (overactive bladder)    Obesity    Osteoarthritis     Left shoulder bursitis    Peripheral vascular disease (HCC)    PMB (postmenopausal bleeding)    Stroke, lacunar (HCC)     TIA & Lacunar Infarct - 2016 Mild plaque/no stenosis carotid arteries    Stroke, lacunar (HCC)    Visual impairment     glasses               Past Surgical History         Past Surgical History:   Procedure Laterality Date    Benign biopsy right        Cataract        Both eyes    Colonoscopy N/A 2021     Procedure: COLONOSCOPY;  Surgeon: Jose F Randolph MD;  Location:  ENDOSCOPY    Colonoscopy       D & c        Dilation/curettage,diagnostic   2022     Abnormal vaginal bleeding    Hysterectomy        Leep        Needle biopsy  right         bening 20 years ago           Oophorectomy Bilateral 2022     at time of hyst    Salpingectomy Bilateral 2022     at time of hyst     Total abdominal hysterectomy   2022     [facility] - Dr. 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   Tobacco Use    Smoking status: Former       Current packs/day: 0.00       Average packs/day: 0.3 packs/day for 10.0 years (2.5 ttl pk-yrs)       Types: Cigarettes       Start date: 1/10/1965       Quit date: 1/10/1975       Years since quittin.9       Passive exposure: Past    Smokeless tobacco: Never    Tobacco comments:       quit age 28   Vaping Use    Vaping status: Never Used   Substance and Sexual Activity    Alcohol use: Yes       Alcohol/week: 2.0 standard drinks of alcohol       Types: 1 Glasses of wine, 1 Cans of beer per week       Comment: Occasionally    Drug use: No    Sexual activity: Not Currently   Other Topics Concern    Caffeine Concern No    Stress Concern No    Weight Concern No    Special Diet No    Exercise Yes    Seat Belt Yes             History   Drug Use No      Available pre-op labs reviewed.        Lab Results   Component Value Date     WBC 10.7 2024     RBC 3.46 (L) 2024     HGB 10.3 (L) 2024     HCT 31.5 (L) 2024     MCV 91.0 2024     MCH 29.8 2024     MCHC 32.7 2024     RDW 12.5 2024     .0 2024            Lab Results   Component Value Date      2024     K 4.4 2024      2024     CO2 27.0 2024     BUN 32 (H) 2024     CREATSERUM 0.81 2024      (H) 2024     CA 8.3 (L) 2024            Lab Results   Component Value Date     INR 1.06 2024            Airway        Mallampati: II  Mouth opening: >3 FB  TM  distance: 4 - 6 cm  Neck ROM: full Cardiovascular        Rhythm: regular  Rate: normal      Dental     Dentition appears grossly intact             Pulmonary     Pulmonary exam normal.  Breath sounds clear to auscultation bilaterally.                     Other findings                    ASA: 3   Plan: general  NPO status verified and      Post-procedure pain management plan discussed with surgeon and patient.     Comment:  I explained intrinsic risks of general anesthesia, including nausea, dental damage, sore throat, mouth injury,and hoarseness from airway management.  All questions were answered and understanding was demonstrated of risks.  Informed permission was obtained to proceed as documented in the signed consent form.      Plan/risks discussed with: patient  Use of blood product(s) discussed with: patient     Consented to blood products.                              MEDICATIONS ADMINISTERED IN LAST 1 DAY:  acetaminophen (Tylenol) tab 650 mg       Date Action Dose Route User    11/26/2024 2037 Given 650 mg Oral Mckenzie Pérez RN    11/26/2024 1748 Given 650 mg Oral Kristyn Marquez RN          atorvastatin (Lipitor) tab 40 mg       Date Action Dose Route User    11/26/2024 2037 Given 40 mg Oral Mckenzie Pérez RN          ceFAZolin (Ancef) injection       Date Action Dose Route User    11/27/2024 1452 Given 2 g Intravenous Marvin Garcia MD          cefTRIAXone (Rocephin) 1 g in sodium chloride 0.9% 100 mL IVPB-ADDV       Date Action Dose Route User    11/27/2024 1224 New Bag 1 g Intravenous Millicent Ward RN          dexAMETHasone PF (Decadron) 10 MG/ML injection 8 mg       Date Action Dose Route User    11/27/2024 0857 Given 8 mg Intravenous Millicent Ward RN          dexamethasone (Decadron) 4 MG/ML injection       Date Action Dose Route User    11/27/2024 1444 Given 4 mg Intravenous Marvin Garcia MD          esmolol (Brevibloc) 100 mg/10mL injection       Date Action Dose Route User    11/27/2024 1426  Given 50 mg Intravenous Marvin Garcia MD          fentaNYL (Sublimaze) 50 mcg/mL injection       Date Action Dose Route User    11/27/2024 1532 Given 50 mcg Intravenous Marvin Garcia MD    11/27/2024 1508 Given 50 mcg Intravenous Marvin Garcia MD          HYDROmorphone (Dilaudid) 1 MG/ML injection 0.2 mg       Date Action Dose Route User    11/27/2024 0550 Given 0.2 mg Intravenous Mckenzie Pérez RN          levothyroxine (Synthroid) tab 50 mcg       Date Action Dose Route User    11/27/2024 0550 Given 50 mcg Oral Mckenzie Pérez RN          lidocaine PF (Xylocaine-MPF) 1% injection       Date Action Dose Route User    11/27/2024 1429 Given 25 mg Injection Marvin Garcia MD          lisinopril (Prinivil; Zestril) tab 5 mg       Date Action Dose Route User    11/27/2024 0857 Given 5 mg Oral Millicent Ward RN          melatonin tab 3 mg       Date Action Dose Route User    11/26/2024 2037 Given 3 mg Oral Mckenzie Pérez RN          metoprolol tartrate (Lopressor) partial tab 12.5 mg       Date Action Dose Route User    11/27/2024 0550 Given 12.5 mg Oral Mckenzie Pérez RN    11/26/2024 1747 Given 12.5 mg Oral KieselKristyn arreguin RN          oxyCODONE immediate release tab 5 mg       Date Action Dose Route User    11/26/2024 2359 Given 5 mg Oral Mckenzie Pérez RN          propofol (Diprivan) 10 MG/ML injection       Date Action Dose Route User    11/27/2024 1429 Given 160 mg Intravenous Marvin Garcia MD          rocuronium (Zemuron) 50 mg/5mL injection       Date Action Dose Route User    11/27/2024 1540 Given 20 mg Intravenous Marvin Garcia MD    11/27/2024 1429 Given 50 mg Intravenous Marvin Garcia MD          sodium chloride 0.9% infusion       Date Action Dose Route User    11/27/2024 1421 New Bag (none) Intravenous Marvin Garcia MD          traMADol (Ultram) tab 50 mg       Date Action Dose Route User    11/26/2024 2326 Given 50 mg Oral Mckenzie Pérez RN          tranexamic acid in sodium  chloride 0.7% (Cyklokapron) 1000 mg/100mL infusion premix       Date Action Dose Route User    11/27/2024 1456 Given 1,000 mg Intravenous Marvin Garcia MD          1402 UE-MAR Hold                   Or   oxyCODONE immediate release tab 5 mg  Dose: 5 mg  Freq: Every 4 hours PRN Route: OR  PRN Reason: severe pain  Start: 11/26/24 1127   Admin Instructions:   Use PRN reason as a guide and follow range order policy            2359 LS-Given      1402 UE-MAR Hold        Vitals (last day)       Date/Time Temp Pulse Resp BP SpO2 Weight O2 Device O2 Flow Rate (L/min) [facility]    11/27/24 1108 98 °F (36.7 °C) 55 16 150/54 96 % -- None (Room air) -- NS    11/27/24 0817 97.7 °F (36.5 °C) 53 18 153/61 97 % -- None (Room air) -- NS    11/27/24 0500 97.8 °F (36.6 °C) 59 18 147/55 98 % -- None (Room air) --     11/26/24 2130 98.4 °F (36.9 °C) 57 18 145/59 98 % -- None (Room air) --     11/26/24 1605 98.1 °F (36.7 °C) 72 16 149/63 -- -- None (Room air) --     11/26/24 1204 -- -- -- -- -- 195 lb (88.5 kg) -- --     11/26/24 1136 97.9 °F (36.6 °C) 64 15 171/64 93 % -- None (Room air) --     11/26/24 1044 -- 62 12 159/68 100 % -- None (Room air) --     11/26/24 1033 -- 65 18 158/72 100 % -- None (Room air) --     11/26/24 1015 -- 64 13 126/75 100 % -- None (Room air) --     11/26/24 1011 -- 64 12 126/75 98 % -- None (Room air) --     11/26/24 0945 -- 64 13 173/69 100 % -- None (Room air) -- EM    11/26/24 0845 -- 66 -- 165/58 100 % -- None (Room air) -- AP    11/26/24 0836 97.8 °F (36.6 °C) 72 20 165/58 98 % -- None (Room air) -- AP    11/26/24 0835 -- -- -- -- -- 195 lb (88.5 kg) -- -- AP

## 2024-11-27 NOTE — PLAN OF CARE
Alert and oriented x 4. Vitals stable on room air. Denies pain at this time. Denies numbness/tingling. Voiding without difficulty via Purewwick. Last BM 11/25. NPO for OR this afternoon. SCD's on bilaterally. Safety precautions in place.

## 2024-11-27 NOTE — OPERATIVE REPORT
Operative Report       Patient Name: Monica Nieto    DOS: 11/27/2024    Preoperative Diagnosis:     Left displaced femoral neck fracture    Postoperative Diagnosis:     Left displaced femoral neck fracture    Surgeons and Role:     * Valdez Anna MD - Primary     Assistant: Surgical Assistant.: Tal Brown    Procedures:     Left hip bipolar hemiarthroplasty (CPT 99775)    Antibiotics: Ancef 2g    Surgical Findings: Displaced femoral neck fracture    Anesthesia: General    Complications: None    Implants:   Implant Name Type Inv. Item Serial No.  Lot No. LRB No. Used Action   CEMENT BNE 40GM HI VISC RADPQ BIOMET - SNA  CEMENT BNE 40GM HI VISC RADPQ BIOMET NA [employer]  [id number] Left 2 Implanted   KIT BNE PREP W/UNIV JOSIAS RESTRIC [id number] DISP - SNA  KIT BNE PREP W/UNIV JOSIAS RESTRIC [id number] DISP NA [employer]  [id number] Left 1 Implanted   HEAD FEM 28MM NK L-3.5MM TAPR 12/14 UNIV - SNA  HEAD FEM 28MM NK L-3.5MM TAPR 12/14 UNIV NA [employer]  [id number] Left 1 Implanted   SHELL BPLR OD48MM ID28MM HIP COCR RINGLOC - SNA  SHELL BPLR OD48MM ID28MM HIP COCR RINGLOC NA [employer]  [id number] Left 1 Implanted   STEM FEM 147MM SZ 4 135DEG STD 12/14 TAPR - SNA  STEM FEM 147MM SZ 4 135DEG STD 12/14 TAPR NA [employer]  [id number] Left 1 Implanted        Condition: Stable    Estimated Blood Loss: 50 mL    Indications:  79 year old female with left displaced femoral neck fracture. Using a shared decision making process, the decision to proceed with hip hemiarthroplasty was made. The risks associated with the procedure, expected  outcome, the expected time to recovery, and the rehab required were reviewed. All of the patient's questions were answered.        Procedure:  Patient was met in the preoperative holding area where consent was verified, laterality was marked with the surgeon's initials, and the H&P was updated. Patient was brought to the operating room on inpatient bed.  Patient was  transferred from the inpatient bed after undergoing general anesthesia and placed in a lateral decubitus position with the operative side up on a padded pegboard.  An axillary roll was placed.  Foam padding was placed under the down leg.  The leg was then prepped and draped in the usual sterile fashion.  A surgical timeout was performed.  Preoperative antibiotics and TXA were confirmed to be infused prior to incision.    A 10 blade was used to make a incision along the posterior aspect of the hip.  The incision was carried through the dermis.  Bovie electrocautery was used to cauterize any small bleeding vessels and used to dissect down to the tensor fascia freda.  The tensor fascia freda was then divided just posterior to the proximal femur.  The gluteus kayli muscle was then divided in line with the tensor fascia freda split.  The Charnley retractor then was utilized for retraction of the TFL.  Bursal tissue was excised with Bovie electrocautery to facilitate visualization of the short external rotators.  The gluteus medius muscle and piriformis tendon were identified.  A Uziel retractor was used to retract the gluteus medius muscle out of the way to help completely visualize the insertion of the short external rotators.  The Bovie was then used to elevate the short external rotators off of the proximal femur and off the capsule.  The short external rotator was tagged with two #2 Ethibond ethibond.  The proximal half of the quadratus for femoris was released off of the proximal femur to facilitate visualization of the femoral neck and the lesser trochanter.  A Nunez elevator was used to reflect off any remaining short external rotators muscle fibers off of the posterior aspect of the hip capsule.  The Bovie was then used to perform the capsulotomy in a hockey-stick fashion. The capsule was tagged two #2 Ethibond suture.  Once the capsule was elevated off of the femoral neck, the femoral neck fracture was identified.   Template was used to amy a femoral neck cut measuring 15 mm proximal to the lesser trochanter.  Cobra retractors superior and inferior to the femoral neck were placed to protect the deep soft tissue structures while performing the femoral neck cut.  A saw was used to make 75% of the femoral neck cut.  A broad osteotome was then used to complete the femoral neck cut.  The bone ring was then removed with a Kocher.  A corkscrew was placed into the femoral head to facilitate removal of it.  A Bovie was then used to divide the ligamentum teres attachment to the femoral head.  The acetabulum was irrigated and any loose bodies were removed.  Femoral head trial was utilized to then subsequently determine the appropriate implant size which was 48 mm.  Proximal femur elevator was utilized to help visualize the canal.  With the gluteus medius retracted using a Uizel retractor, a box osteotome was used to establish the entry point for the femoral canal.  The femoral canal finder was then used to confirm the appropriate trajectory.  A lateralizer on ream was used to remove any lateral bony impingement for future stem placement.  The femur was subsequently broached in 20 degrees of anteversion noting good friction/interference with size 4 stem.  A planar was used to confirm a flush cut.  The trial stem, femoral head, and neck were placed to test hip stability. Appropriate stability of the hip was noted intraoperatively.    The trials were removed.  The femoral canal was irrigated and dried.  A cement restrictor was placed 15 mm to the anticipated distal end of the final stem.  The femoral canal was packed to absorb any blood while the cement was mixed.  The packing was removed and the cement was injected into the femoral canal and pressurized.  The final stem was seated with 20 degrees of anteversion until the cement had fully hardened.  Excess cement was removed prior to hardening.  The trial femoral head and neck were placed  on the final implant and stability was reassessed.  A 48 mm femoral head,standard offset and 0 neck length were finally implanted with hip having appropriate stability at hip flexion of 90 degrees and internal rotation of 40 degrees.  The operative site was irrigated with pulse lavage.  30 mL of local was infiltrated in the deep tissues and another 30 mL of local was infiltrated into the subcutaneous tissues.      Closure:  The capsule and short external rotators were repaired utilizing 2 bone tunnels through the greater trochanter.  The surgical site was once again irrigated with sterile saline prior to closure of the tensor fascia freda.  The tensor fascia freda was closed using a strata fix suture in a running fashion proximally and then distally again.  A watertight closure was noted.  The subcutaneous tissue was reapproximated using 2-0 Vicryl in a simple interrupted fashion.  3-0 Monocryl was run in the subcuticular space.  Exofin and Steri-Strips were applied.    Dressing:  An Aquacel dressing was placed over the incision.    Post Operative:  Patient was woken up from anesthesia and taken to PACU for further recovery.      Valdez Anna MD   Orthopedic Surgery  [email or web address]  t: [phone]  f: [phone]

## 2024-11-27 NOTE — ANESTHESIA PREPROCEDURE EVALUATION
PRE-OP EVALUATION    Patient Name: Monica Nieto    Admit Diagnosis: Left displaced femoral neck fracture (HCC) [S72.002A]    Pre-op Diagnosis: LEFT FEMORAL NECK FRACTURE    LEFT HIP HEMIARTHROPLASTY    Anesthesia Procedure: LEFT HIP HEMIARTHROPLASTY (Left)    Surgeons and Role:     * Valdez Anna MD - Primary    Pre-op vitals reviewed.  Temp: 98 °F (36.7 °C)  Pulse: 55  Resp: 16  BP: 150/54  SpO2: 96 %  Body mass index is 31.47 kg/m².    Current medications reviewed.  Hospital Medications:   [Transfer Hold] lidocaine PF (Xylocaine-MPF) 1% injection  2 mL Injection PRN    [Transfer Hold] ropivacaine (Naropin) 0.5% injection  30 mL Injection PRN    [Transfer Hold] EPINEPHrine (Adrenalin) 1 MG/ML injection (Allergic Reaction Kit) 1 mg  1 mg Injection PRN    [Transfer Hold] sodium chloride 0.9% PF injection 10 mL  10 mL Injection PRN    [Transfer Hold] acetaminophen (Tylenol Extra Strength) tab 1,000 mg  1,000 mg Oral Q4H PRN    [Transfer Hold] melatonin tab 3 mg  3 mg Oral Nightly PRN    [Transfer Hold] glycerin-hypromellose- (Artificial Tears) 0.2-0.2-1 % ophthalmic solution 1 drop  1 drop Both Eyes QID PRN    [Transfer Hold] sodium chloride (Saline Mist) 0.65 % nasal solution 1 spray  1 spray Each Nare Q3H PRN    [Transfer Hold] enoxaparin (Lovenox) 40 MG/0.4ML SUBQ injection 40 mg  40 mg Subcutaneous Daily    [Transfer Hold] morphINE PF 2 MG/ML injection 1 mg  1 mg Intravenous Q2H PRN    Or    [Transfer Hold] morphINE PF 2 MG/ML injection 2 mg  2 mg Intravenous Q2H PRN    Or    [Transfer Hold] morphINE PF 4 MG/ML injection 4 mg  4 mg Intravenous Q2H PRN    [Transfer Hold] polyethylene glycol (PEG 3350) (Miralax) 17 g oral packet 17 g  17 g Oral Daily PRN    [Transfer Hold] sennosides (Senokot) tab 17.2 mg  17.2 mg Oral Nightly PRN    [Transfer Hold] bisacodyl (Dulcolax) 10 MG rectal suppository 10 mg  10 mg Rectal Daily PRN    [Transfer Hold] fleet enema (Fleet) rectal enema 133 mL  1 enema Rectal Once PRN     [Transfer Hold] ondansetron (Zofran) 4 MG/2ML injection 4 mg  4 mg Intravenous Q6H PRN    [Transfer Hold] metoclopramide (Reglan) 5 mg/mL injection 5 mg  5 mg Intravenous Q8H PRN    [Transfer Hold] hydrALAzine (Apresoline) 20 mg/mL injection 5 mg  5 mg Intravenous Q6H PRN    [Transfer Hold] atorvastatin (Lipitor) tab 40 mg  40 mg Oral Nightly    [Transfer Hold] lisinopril (Prinivil; Zestril) tab 5 mg  5 mg Oral Daily    [Transfer Hold] levothyroxine (Synthroid) tab 50 mcg  50 mcg Oral Daily @ 0700    [Transfer Hold] metoprolol tartrate (Lopressor) partial tab 12.5 mg  12.5 mg Oral 2x Daily(Beta Blocker)    [Transfer Hold] oxyCODONE immediate release partial tab 2.5 mg  2.5 mg Oral Q4H PRN    Or    [Transfer Hold] oxyCODONE immediate release tab 5 mg  5 mg Oral Q4H PRN    [Transfer Hold] HYDROmorphone (Dilaudid) 1 MG/ML injection 0.2 mg  0.2 mg Intravenous Q2H PRN    Or    [Transfer Hold] HYDROmorphone (Dilaudid) 1 MG/ML injection 0.4 mg  0.4 mg Intravenous Q2H PRN    [Transfer Hold] tiZANidine (Zanaflex) partial tab 1 mg  1 mg Oral Q6H PRN    [COMPLETED] dexAMETHasone PF (Decadron) 10 MG/ML injection 8 mg  8 mg Intravenous Once    [Transfer Hold] traMADol (Ultram) tab 50 mg  50 mg Oral 4 times per day    [Transfer Hold] acetaminophen (Tylenol) tab 650 mg  650 mg Oral Q4H While awake    clonidine/epinephrine/ropivacaine/ketorolac in 0.9% NaCl 60 mL pain cocktail syringe for hip arthroplasty   Infiltration Once (Intra-Op)    cefTRIAXone (Rocephin) 1 g in sodium chloride 0.9% 100 mL IVPB-ADDV  1 g Intravenous Q24H       Outpatient Medications:   Prescriptions Prior to Admission[1]    Allergies: Cholestatin, Cyclobenzaprine, and Seasonal      Anesthesia Evaluation  Patient Active Problem List   Diagnosis    Hypothyroidism    Dyslipidemia    Essential hypertension    Carotid disease, bilateral (HCC)    SVT (supraventricular tachycardia) (HCC)    Atherosclerosis of aorta (HCC)    Tortuous aorta (HCC)    History of  stroke    Cervical spondylosis    Lumbar spondylosis    Type 2 diabetes mellitus with hyperglycemia, without long-term current use of insulin (HCC)    CKD (chronic kidney disease) stage 3, GFR 30-59 ml/min (HCC)    CKD stage 3 due to type 2 diabetes mellitus (HCC)    Left displaced femoral neck fracture (HCC)    Hip fracture (HCC)        Past Medical History:    Abnormal Pap smear of cervix    Acute blood loss anemia    Acute, but ill-defined, cerebrovascular disease    Tia    Allergic rhinitis    Arrhythmia    Tachycardia    Arthritis    left shoulder- had xray    Back problem    Occas. pain    Calculus of kidney    Cancer (HCC)    Basel cell    Cataract    Cervical spondylosis    KATIANA I (cervical intraepithelial neoplasia I)    Actually KATIANA 1-2, , and KIRK 1    Complex endometrial hyperplasia with atypia    Disorder of thyroid    Esophageal reflux    Essential hypertension    Hemorrhoids    High blood pressure    High cholesterol    Hx of motion sickness    Hyperlipidemia    Hyperthyroidism    Hypothyroidism    Irregular bowel habits    Nail fungus    Onychomycosis Right foot, great toe     (normal spontaneous vaginal delivery) (formerly [facility])    OAB (overactive bladder)    Obesity    Osteoarthritis    Left shoulder bursitis    Peripheral vascular disease (HCC)    PMB (postmenopausal bleeding)    Stroke, lacunar (HCC)    TIA & Lacunar Infarct - 2016 Mild plaque/no stenosis carotid arteries    Stroke, lacunar (HCC)    Visual impairment    glasses          Past Surgical History:   Procedure Laterality Date    Benign biopsy right      Cataract      Both eyes    Colonoscopy N/A 2021    Procedure: COLONOSCOPY;  Surgeon: Jose F Randolph MD;  Location:  ENDOSCOPY    Colonoscopy      D & c      Dilation/curettage,diagnostic  2022    Abnormal vaginal bleeding    Hysterectomy      Leep      Needle biopsy right      bening 20 years ago          Oophorectomy Bilateral 2022    at time of hyst     Salpingectomy Bilateral 2022    at time of hyst     Total abdominal hysterectomy  2022    [facility], [place] - Dr. 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   Tobacco Use    Smoking status: Former     Current packs/day: 0.00     Average packs/day: 0.3 packs/day for 10.0 years (2.5 ttl pk-yrs)     Types: Cigarettes     Start date: 1/10/1965     Quit date: 1/10/1975     Years since quittin.9     Passive exposure: Past    Smokeless tobacco: Never    Tobacco comments:     quit age 28   Vaping Use    Vaping status: Never Used   Substance and Sexual Activity    Alcohol use: Yes     Alcohol/week: 2.0 standard drinks of alcohol     Types: 1 Glasses of wine, 1 Cans of beer per week     Comment: Occasionally    Drug use: No    Sexual activity: Not Currently   Other Topics Concern    Caffeine Concern No    Stress Concern No    Weight Concern No    Special Diet No    Exercise Yes    Seat Belt Yes     History   Drug Use No     Available pre-op labs reviewed.  Lab Results   Component Value Date    WBC 10.7 2024    RBC 3.46 (L) 2024    HGB 10.3 (L) 2024    HCT 31.5 (L) 2024    MCV 91.0 2024    MCH 29.8 2024    MCHC 32.7 2024    RDW 12.5 2024    .0 2024     Lab Results   Component Value Date     2024    K 4.4 2024     2024    CO2 27.0 2024    BUN 32 (H) 2024    CREATSERUM 0.81 2024     (H) 2024    CA 8.3 (L) 2024     Lab Results   Component Value Date    INR 1.06 2024         Airway      Mallampati: II  Mouth opening: >3 FB  TM distance: 4 - 6 cm  Neck ROM: full Cardiovascular      Rhythm: regular  Rate: normal     Dental    Dentition appears grossly intact         Pulmonary    Pulmonary exam normal.  Breath sounds clear to auscultation  bilaterally.               Other findings              ASA: 3   Plan: general  NPO status verified and     Post-procedure pain management plan discussed with surgeon and patient.    Comment:  I explained intrinsic risks of general anesthesia, including nausea, dental damage, sore throat, mouth injury,and hoarseness from airway management.  All questions were answered and understanding was demonstrated of risks.  Informed permission was obtained to proceed as documented in the signed consent form.      Plan/risks discussed with: patient  Use of blood product(s) discussed with: patient    Consented to blood products.          Present on Admission:  **None**             [1]   Medications Prior to Admission   Medication Sig Dispense Refill Last Dose/Taking    MAGNESIUM OR Take 1 tablet by mouth daily.   Past Week    Multiple Vitamin (MULTI-VITAMIN) Oral Tab Take 1 tablet by mouth daily.   Past Week    Cod Liver Oil (COD LIVER OR) Take 1 tablet by mouth daily.   Past Week    CALCIUM OR Take 1 tablet by mouth daily.   Past Week    Cholecalciferol (VITAMIN D-3 OR) Take 1 tablet by mouth daily.   Past Week    lisinopril 5 MG Oral Tab Take 1 tablet (5 mg total) by mouth daily. 90 tablet 1 11/25/2024 Morning    metoprolol tartrate 25 MG Oral Tab TAKE 1/2 TABLET(12.5 MG) BY MOUTH TWICE DAILY 90 tablet 1 11/25/2024 Morning    atorvastatin 40 MG Oral Tab Take 1 tablet (40 mg total) by mouth nightly. 90 tablet 0 11/25/2024 Evening    levothyroxine 50 MCG Oral Tab Take 1 tablet (50 mcg total) by mouth before breakfast. 90 tablet 2 11/25/2024 Morning    diclofenac 1 % External Gel Apply 2 g topically 2 (two) times daily as needed. 50 g 0 Past Week    Naproxen Sodium 220 MG Oral Cap Take 440 mg by mouth every 6 (six) hours as needed.   11/25/2024 Morning    aspirin 325 MG Oral Tab Take 1 tablet (325 mg total) by mouth daily.   11/23/2024

## 2024-11-27 NOTE — PROGRESS NOTES
[facility]   part of [facility]     Hospitalist Progress Note     Monica Nieto Patient Status:  Inpatient    1945 MRN [id number]   Location [facility] 3SW-A Attending Marlene Shah MD   Hosp Day # 1 PCP Rosette Ivy MD     Chief Complaint: left hip pain    Subjective:     Patient feels better overall    Objective:    Review of Systems:   A comprehensive review of systems was completed; pertinent positive and negatives stated in subjective.    Vital signs:  Temp:  [97.7 °F (36.5 °C)-98.4 °F (36.9 °C)] 98 °F (36.7 °C)  Pulse:  [53-72] 55  Resp:  [16-18] 16  BP: (145-153)/(54-63) 150/54  SpO2:  [96 %-98 %] 96 %    Physical Exam:    General: No acute distress  Respiratory: No wheezes, no rhonchi  Cardiovascular: S1, S2, regular rate and rhythm  Abdomen: Soft, Non-tender, non-distended, positive bowel sounds  Neuro: No new focal deficits.   Extremities: No edema      Diagnostic Data:    Labs:  Recent Labs   Lab 2446 24  0439   WBC 8.4 10.7   HGB 11.7* 10.3*   MCV 89.0 91.0   .0 207.0   INR 1.06  --        Recent Labs   Lab 2446 24  0439   * 112*   BUN 19 32*   CREATSERUM 0.88 0.81   CA 9.0 8.3*   ALB 3.8  --     142   K 4.2 4.4    109   CO2 29.0 27.0   ALKPHO 90  --    AST 22  --    ALT 16  --    BILT 0.8  --    TP 6.0  --        Estimated Creatinine Clearance: 51 mL/min (based on SCr of 0.81 mg/dL).    No results for input(s): \"TROP\", \"TROPHS\", \"CK\" in the last 168 hours.    Recent Labs   Lab 24  0946   PTP 13.9   INR 1.06                  Microbiology    Hospital Encounter on 24   1. Urine Culture, Routine     Status: Abnormal (Preliminary result)    Collection Time: 24 11:19 AM    Specimen: Urine, clean catch   Result Value Ref Range    Urine Culture >100,000 CFU/ML Gram Negative Bk (A) N/A         Imaging: Reviewed in Epic.    Medications:    enoxaparin  40 mg Subcutaneous Daily    atorvastatin  40 mg Oral Nightly     lisinopril  5 mg Oral Daily    levothyroxine  50 mcg Oral Daily @ 0700    metoprolol tartrate  12.5 mg Oral 2x Daily(Beta Blocker)    traMADol  50 mg Oral 4 times per day    acetaminophen  650 mg Oral Q4H While awake    clonidine/epinephrine/ropivacaine/ketorolac in 0.9% NaCl 60 mL pain cocktail syringe for hip arthroplasty   Infiltration Once (Intra-Op)    cefTRIAXone  1 g Intravenous Q24H       Assessment & Plan:      #LEft hip pain 2/2 fx  S/p nerve block in ER  To OR later today  PT/OT  #Hypertension  Continue home meds  #Hypothyroid   #possible UTI  Await Cx, CTX 11/26     Plan of care discussed with pt       Marlene Shah MD    Supplementary Documentation:     Quality:  DVT Mechanical Prophylaxis:   SCDs,    DVT Pharmacologic Prophylaxis   Medication    enoxaparin (Lovenox) 40 MG/0.4ML SUBQ injection 40 mg                Code Status: Prior  [place]: External urinary catheter in place  [place] Duration (in days):   Central line:    CIARRA:     Discharge is dependent on: progress  At this point Ms. Nieto is expected to be discharge to: tbd    The 21st Century Cures Act makes medical notes like these available to patients in the interest of transparency. Please be advised this is a medical document. Medical documents are intended to carry relevant information, facts as evident, and the clinical opinion of the practitioner. The medical note is intended as peer to peer communication and may appear blunt or direct. It is written in medical language and may contain abbreviations or verbiage that are unfamiliar.              **Certification      PHYSICIAN Certification of Need for Inpatient Hospitalization - Initial Certification    Patient will require inpatient services that will reasonably be expected to span two midnight's based on the clinical documentation in H+P.   Based on patients current state of illness, I anticipate that, after discharge, patient will require TBD.

## 2024-11-27 NOTE — CONSULTS
Consult Note        CC: Left hip hemiarthroplasty    HPI: This 79 year old female who sustained a mechanical fall from ground level height on to left side on Saturday. She had increasing hip pain and eventually was unable to bear weight. Patient at rest has mild pain but more severe pain with movement.    Past Medical History:    Abnormal Pap smear of cervix    Acute blood loss anemia    Acute, but ill-defined, cerebrovascular disease    Tia    Allergic rhinitis    Arrhythmia    Tachycardia    Arthritis    left shoulder- had xray    Back problem    Occas. pain    Calculus of kidney    Cancer (HCC)    Basel cell    Cataract    Cervical spondylosis    KATIANA I (cervical intraepithelial neoplasia I)    Actually KATIANA 1-2, , and KIRK 1    Complex endometrial hyperplasia with atypia    Disorder of thyroid    Esophageal reflux    Essential hypertension    Hemorrhoids    High blood pressure    High cholesterol    Hx of motion sickness    Hyperlipidemia    Hyperthyroidism    Hypothyroidism    Irregular bowel habits    Nail fungus    Onychomycosis Right foot, great toe     (normal spontaneous vaginal delivery) (HCC)    OAB (overactive bladder)    Obesity    Osteoarthritis    Left shoulder bursitis    Peripheral vascular disease (HCC)    PMB (postmenopausal bleeding)    Stroke, lacunar (HCC)    TIA & Lacunar Infarct - 2016 Mild plaque/no stenosis carotid arteries    Stroke, lacunar (HCC)    Visual impairment    glasses     Past Surgical History:   Procedure Laterality Date    Benign biopsy right      Cataract      Both eyes    Colonoscopy N/A 2021    Procedure: COLONOSCOPY;  Surgeon: Jose F Randolph MD;  Location:  ENDOSCOPY    Colonoscopy      D & c      Dilation/curettage,diagnostic  2022    Abnormal vaginal bleeding    Hysterectomy      Leep      Needle biopsy right      bening 20 years ago          Oophorectomy Bilateral 2022    at time of hyst    Salpingectomy Bilateral  2022    at time of hyst     Total abdominal hysterectomy  2022    TOMASA, LEELA - Dr. Millicent Edmonds      No current outpatient medications on file.     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   Tobacco Use    Smoking status: Former     Current packs/day: 0.00     Average packs/day: 0.3 packs/day for 10.0 years (2.5 ttl pk-yrs)     Types: Cigarettes     Start date: 1/10/1965     Quit date: 1/10/1975     Years since quittin.9     Passive exposure: Past    Smokeless tobacco: Never    Tobacco comments:     quit age 28   Vaping Use    Vaping status: Never Used   Substance and Sexual Activity    Alcohol use: Yes     Alcohol/week: 2.0 standard drinks of alcohol     Types: 1 Glasses of wine, 1 Cans of beer per week     Comment: Occasionally    Drug use: No    Sexual activity: Not Currently        ROS:  12 pt system review obtained and negative except as mentioned above      Physical Exam:    /54 (BP Location: Right arm)   Pulse 55   Temp 98 °F (36.7 °C) (Oral)   Resp 16   Ht 5' 6\" (1.676 m)   Wt 195 lb (88.5 kg)   SpO2 96%   BMI 31.47 kg/m²   Constitutional: AOx3.  Psychological: Normal judgement affect  Respiratory: non-labored  Cardiac: regular rate  Left lower extremity:  Inspection: skin intact without evidence of ecchymosis, shortened and rotated leg  Palpation: Mild tenderness over the hip  Range of motion:  Deferred  Neuromuscular: Sensation is intact light touch over the foot.  Dorsiflexion plantarflexion intact  Vascular: Palpable DP/PT pulses  Lymph: No lymphatic streaking      Imaging:   XR Left hip/pelvis: femoral neck fracture displaced.   I personally viewed, independently interpreted and radiology report read.       Labs:   Lab Results   Component Value Date    WBC 10.7 11/27/2024    HGB 10.3 11/27/2024    HCT 31.5 11/27/2024    .0 11/27/2024    CREATSERUM 0.81 11/27/2024    BUN 32 11/27/2024     11/27/2024    K 4.4 11/27/2024     11/27/2024    CO2 27.0 11/27/2024     11/27/2024    CA 8.3 11/27/2024    MG 1.9 11/27/2024         Assessment/Plan:  79 year old female with a left displaced femoral neck fracture. Given the patient's age comorbidities and functional demands a cemented left hip hemiarthroplasty was recommended.    The patient consented to surgical procedure having understood the risks associated with hip sarah-arthroplasty that include but are not limited to:    1. Infection: Any surgical procedure carries a risk of infection. In hip sarah-arthroplasty, infection can occur at the surgical site or spread to the joint, leading to joint infection (septic arthritis). Infections may require further treatment, such as antibiotic therapy or revision surgery require removal of implant.    2. Blood clots: The immobilization and reduced mobility associated with hip sarah-arthroplasty can increase the risk of blood clot formation in the leg veins (deep vein thrombosis) or potentially a clot traveling to the lungs (pulmonary embolism). Measures are usually taken to prevent blood clots, such as early mobilization, compression stockings, sequential compression devices, and blood-thinning medications.    3. Dislocation: Patients are typically advised to follow certain precautions and movement restrictions in the early postoperative period to minimize the risk of dislocation.    4. Leg length  inequality: Achieving optimal leg length during surgery can be challenging, and there is a risk of postoperative leg length discrepancy. Leg length inequality may cause gait abnormalities and discomfort. I make every effort to achieve appropriate leg length, but sometimes adjustments are necessary to ensure a stable hip.    5. Nerve and blood vessel injury: During the surgery, there is a small risk of injury to nearby nerves and blood vessels. Nerve injuries can result in sensory or motor deficits, while blood vessel injuries may lead to bleeding and hematoma formation.    6. Implant-related issues: Prosthetic components used in hip sarah-arthroplasty can have complications specific to the implants themselves. These may include implant loosening, wear and tear over time, fracture of the prosthetic component, or implant failure.    7. Pain and stiffness: Some patients may experience persistent pain or stiffness in the hip joint following hip sarah-arthroplasty. This can be due to various factors, such as inadequate healing, soft tissue irritation, or other underlying conditions.      Venkata Anna MD   Orthopedic Surgery  [email or web address]  t: [phone]  f: [phone]         [1]   Allergies  Allergen Reactions    Cholestatin ITCHING and Runny nose    Cyclobenzaprine RASH     Flushing and chest rash which she gets from various meds and other triggers    Seasonal Runny nose and ITCHING

## 2024-11-27 NOTE — CM/SW NOTE
11/27/24 1000   CM/SW Referral Data   Referral Source Social Work (self-referral)   Reason for Referral Discharge planning   Informant Patient;EMR;Clinical Staff Member   Patient Info   Patient's Current Mental Status at Time of Assessment Alert;Oriented   Patient's Home Environment House   Number of Levels in Home 3   Patient lives with Alone   Patient Status Prior to Admission   Independent with ADLs and Mobility Yes   Services in place prior to admission DME/Supplies at home   Type of DME/Supplies [employer]  (normally does not use)   Discharge Needs   Anticipated D/C needs Home health care       Patient is an 78 y/o woman admitted with hip fracture.  Plan for OR later today.  Met with pt at bedside to discuss DC planning.  Pt lives alone in a split level home.  She is normally independent with ADLs and drives.  Discussed possible DC needs including home with HH and 24 hour support vs [facility].  Pt does not want to go to a [facility] facility.  She plans to stay with her dtr in a one level apartment that her dtr owns in [place].  She is open to [facility] services.  Requested that she obtain address for the apartment so that  referrals can be sent.  Plan to follow up on post op MD and therapy recommendations for further DC planning.  / to remain available for support and/or discharge planning.     Keyla Rudolph, [facility]  Discharge Planner  [phone]

## 2024-11-27 NOTE — OCCUPATIONAL THERAPY NOTE
OT orders received and chart reviewed. Pt with a L femoral neck fracture with plan for surgery today 11/27/24. Will need updated orders and WB status post operatively.

## 2024-11-28 LAB
ANION GAP SERPL CALC-SCNC: 7 MMOL/L (ref 0–18)
BUN BLD-MCNC: 32 MG/DL (ref 9–23)
CALCIUM BLD-MCNC: 8.5 MG/DL (ref 8.7–10.4)
CHLORIDE SERPL-SCNC: 108 MMOL/L (ref 98–112)
CO2 SERPL-SCNC: 26 MMOL/L (ref 21–32)
CREAT BLD-MCNC: 0.97 MG/DL
EGFRCR SERPLBLD CKD-EPI 2021: 59 ML/MIN/1.73M2 (ref 60–?)
GLUCOSE BLD-MCNC: 117 MG/DL (ref 70–99)
HCT VFR BLD AUTO: 29.5 %
HGB BLD-MCNC: 9.7 G/DL
OSMOLALITY SERPL CALC.SUM OF ELEC: 300 MOSM/KG (ref 275–295)
POTASSIUM SERPL-SCNC: 4.4 MMOL/L (ref 3.5–5.1)
SODIUM SERPL-SCNC: 141 MMOL/L (ref 136–145)

## 2024-11-28 PROCEDURE — 99232 SBSQ HOSP IP/OBS MODERATE 35: CPT | Performed by: HOSPITALIST

## 2024-11-28 NOTE — PHYSICAL THERAPY NOTE
PHYSICAL THERAPY JOINT EVALUATION - INPATIENT     Room Number: 379/379-A  Evaluation Date: 11/28/2024  Type of Evaluation: Initial  Physician Order: PT Eval and Treat    Presenting Problem: fall > s/p L hip sarah 11/27  Co-Morbidities : h/o CKD, h/o CVA  Reason for Therapy: Mobility Dysfunction and Discharge Planning    PHYSICAL THERAPY ASSESSMENT   Patient is a 79 year old female admitted 11/26/2024 for fall, now s/p L hip sarah on 11/27.  Prior to admission, patient's baseline is mod IND.  Patient is currently functioning near baseline with bed mobility, transfers, gait, stair negotiation, and maintaining seated position.  Patient is requiring supervision. No further IP PT needs identified at this time. PT to sign off.    Patient will benefit from continued skilled PT Services at discharge to promote prior level of function and safety with additional support and return home with home health PT.    PLAN DURING HOSPITALIZATION  Nursing Mobility Recommendation : 1 Assist  PT Device Recommendation: Rolling walker;Gait belt              CURRENT GOALS  Goal #1  Patient is able to demonstrate supine - sit EOB @ level: supervision     Goal #2  Patient is able to demonstrate transfers Sit to/from Stand at assistance level: supervision   Goal #3    Patient is able to ambulate 150 feet with assistive device at assistance level: supervision   Goal #4    Patient will negotiate one curb w/ assistive device and supervision   Goal #5    Patient verbalizes and/or demonstrates all precautions and safety concerns independently    Goal #6      Goal Comments: Goals established and met on 11/28/2024    PHYSICAL THERAPY MEDICAL/SOCIAL HISTORY  History related to current admission: Patient is a 79 year old female admitted on 11/26/2024 from home for fall.  Now s/p L hip sarah on 11/27.    HOME SITUATION  Type of Home: Apartment (going to dtr's apt per pt)   Home Layout: One level  Stairs to Enter : 0           Lives With: Daughter           Prior Level of [place]: pt reports being mod IND prior to admission. Only one fall in past year (the fall that brought her in)    SUBJECTIVE  \"Ok maybe the recliner\"    OBJECTIVE  Precautions: Bed/chair alarm  Fall Risk: Standard fall risk    WEIGHT BEARING RESTRICTION  L Lower Extremity: Weight Bearing as Tolerated    PAIN ASSESSMENT  Rating: 3  Location: L hip  Management Techniques: Activity promotion;Body mechanics;Breathing techniques;Relaxation;Repositioning    COGNITION  Overall Cognitive Status:  WFL - within functional limits    RANGE OF MOTION AND STRENGTH ASSESSMENT  Upper extremity ROM and strength are within functional limits     Lower extremity ROM is within functional limits     Lower extremity strength is within functional limits       BALANCE  Static Sitting: Good  Dynamic Sitting: Good  Static Standing: Fair +  Dynamic Standing: Fair +    ADDITIONAL TESTS                                    ACTIVITY TOLERANCE                         O2 WALK       NEUROLOGICAL FINDINGS  Neurological Findings: None                     AM-PAC '6-Clicks' INPATIENT SHORT FORM - BASIC MOBILITY  How much difficulty does the patient currently have...  Patient Difficulty: Turning over in bed (including adjusting bedclothes, sheets and blankets)?: A Little   Patient Difficulty: Sitting down on and standing up from a chair with arms (e.g., wheelchair, bedside commode, etc.): A Little   Patient Difficulty: Moving from lying on back to sitting on the side of the bed?: A Little   How much help from another person does the patient currently need...   Help from Another: Moving to and from a bed to a chair (including a wheelchair)?: A Little   Help from Another: Need to walk in hospital room?: A Little   Help from Another: Climbing 3-5 steps with a railing?: A Little       AM-PAC Score:  Raw Score: 18   Approx Degree of Impairment: 46.58%   Standardized Score (AM-PAC Scale): 43.63   CMS Modifier (G-Code): CK    FUNCTIONAL  ABILITY STATUS  Gait Assessment   Functional Mobility/Gait Assessment  Gait Assistance: Supervision  Distance (ft): 150, 100  Assistive Device: Rolling walker  Pattern: Within Functional Limits  Stairs: Stoop/curb;Car transfer  Stoop/Curb: x2, RW, supervision  Car transfer: supervision    Skilled Therapy Provided    Bed Mobility:  Supine to sit: supervision      Transfer Mobility:  Sit to stand: supervision, RW   Stand to sit: supervision, RW  Gait = supervision, RW    Therapist's Comments: pt educated on role of IP PT services, PT evaluation purpose, PT POC, PT goals, device recommendations, dc recommendations, and importance of mobility while hospitalized.   -discussed safety when performing stairs/stoop/curbs  -no LOB observed throughout session.  -incr time required throughout.     Exercise/Education Provided:  Bed mobility  Body mechanics  Energy conservation  Functional activity tolerated  Gait training  Posture  Strengthening  Transfer training    Patient End of Session: Up in chair;Needs met;RN aware of session/findings;Call light within reach;Bracing education provided per handout;All patient questions and concerns addressed;Hospital anti-slip socks    Patient Evaluation Complexity Level:  History Moderate - 1 or 2 personal factors and/or co-morbidities   Examination of body systems Moderate - addressing a total of 3 or more elements   Clinical Presentation Low- Stable   Clinical Decision Making Low Complexity       PT Session Time: 30 minutes  Therapeutic Activity: 16 minutes

## 2024-11-28 NOTE — PROGRESS NOTES
[facility]   part of [facility]     Hospitalist Progress Note     Monica Nieto Patient Status:  Inpatient    1945 MRN [id number]   Location [facility] 3SW-A Attending Marlene Shah MD   Hosp Day # 2 PCP Rosette Ivy MD     Chief Complaint: left hip pain    Subjective:     Patient feels better overall    Objective:    Review of Systems:   A comprehensive review of systems was completed; pertinent positive and negatives stated in subjective.    Vital signs:  Temp:  [97 °F (36.1 °C)-98.3 °F (36.8 °C)] 97.6 °F (36.4 °C)  Pulse:  [55-89] 62  Resp:  [-] 18  BP: ()/(51-89) 117/66  SpO2:  [93 %-100 %] 99 %    Physical Exam:    General: No acute distress  Respiratory: No wheezes, no rhonchi  Cardiovascular: S1, S2, regular rate and rhythm  Abdomen: Soft, Non-tender, non-distended, positive bowel sounds  Neuro: No new focal deficits.   Extremities: No edema      Diagnostic Data:    Labs:  Recent Labs   Lab 24  0946 24  0439 24  0523   WBC 8.4 10.7  --    HGB 11.7* 10.3* 9.7*   MCV 89.0 91.0  --    .0 207.0  --    INR 1.06  --   --        Recent Labs   Lab 24  0946 24  0439 24  0524   * 112* 117*   BUN 19 32* 32*   CREATSERUM 0.88 0.81 0.97   CA 9.0 8.3* 8.5*   ALB 3.8  --   --     142 141   K 4.2 4.4 4.4    109 108   CO2 29.0 27.0 26.0   ALKPHO 90  --   --    AST 22  --   --    ALT 16  --   --    BILT 0.8  --   --    TP 6.0  --   --        Estimated Creatinine Clearance: 42.6 mL/min (based on SCr of 0.97 mg/dL).    No results for input(s): \"TROP\", \"TROPHS\", \"CK\" in the last 168 hours.    Recent Labs   Lab 24  0946   PTP 13.9   INR 1.06                  Microbiology    Hospital Encounter on 24   1. Urine Culture, Routine     Status: Abnormal    Collection Time: 24 11:19 AM    Specimen: Urine, clean catch   Result Value Ref Range    Urine Culture >100,000 CFU/ML Escherichia coli (A) N/A       Susceptibility     Escherichia coli -  (no method available)     Ampicillin 8 Sensitive      Cefazolin <=4 Sensitive      Ciprofloxacin <=0.25 Sensitive      Gentamicin <=1 Sensitive      Meropenem <=0.25 Sensitive      Levofloxacin <=0.12 Sensitive      Nitrofurantoin 32 Sensitive      Piperacillin + Tazobactam <=4 Sensitive      Trimethoprim/Sulfa <=20 Sensitive          Imaging: Reviewed in Epic.    Medications:    aspirin  81 mg Oral BID    acetaminophen  1,000 mg Oral Q8H RAJANI    ketorolac  15 mg Intravenous Q6H    Followed by    naproxen  500 mg Oral BID with meals    atorvastatin  40 mg Oral Nightly    lisinopril  5 mg Oral Daily    levothyroxine  50 mcg Oral Daily @ 0700    metoprolol tartrate  12.5 mg Oral 2x Daily(Beta Blocker)    cefTRIAXone  1 g Intravenous Q24H       Assessment & Plan:      #LEft hip pain 2/2 fx  S/p nerve block in ER  S/p Left hip bipolar hemiarthroplasty     PT/OT  #Hypertension  Continue home meds  stable  #Hypothyroid on Synthroid  # UTI  E coli S to all abx     Plan of care discussed with pt       Marlene Shah MD    Supplementary Documentation:     Quality:  DVT Mechanical Prophylaxis:   SCDs, Early ambuation  DVT Pharmacologic Prophylaxis   Medication   None         DVT Pharmacologic prophylaxis: Aspirin 81 mg      Code Status: Prior  [place]: No urinary catheter in place  [place] Duration (in days):   Central line:    CIARRA:     Discharge is dependent on: progress  At this point Ms. Nieto is expected to be discharge to: tbd    The 21st Century Cures Act makes medical notes like these available to patients in the interest of transparency. Please be advised this is a medical document. Medical documents are intended to carry relevant information, facts as evident, and the clinical opinion of the practitioner. The medical note is intended as peer to peer communication and may appear blunt or direct. It is written in medical language and may contain abbreviations or verbiage that are unfamiliar.               **Certification      PHYSICIAN Certification of Need for Inpatient Hospitalization - Initial Certification    Patient will require inpatient services that will reasonably be expected to span two midnight's based on the clinical documentation in H+P.   Based on patients current state of illness, I anticipate that, after discharge, patient will require TBD.

## 2024-11-28 NOTE — PLAN OF CARE
Assumed care at 130  Controlled Left hip pain, pain medicines given.  Voided.  PT/OT to see.  Plan:D/c at daughter's home.  Problem: Patient/Family Goals  Goal: Patient/Family Long Term Goal  Description: Patient's Long Term Goal: to maintain a healthy lifestyle    Interventions:  - increased exercise and mobility  - healthy diet  - See additional Care Plan goals for specific interventions  Outcome: Progressing  Goal: Patient/Family Short Term Goal  Description: Patient's Short Term Goal: pain controlled    Interventions:   - pain medicine  -cold therapy  -needs attended    - See additional Care Plan goals for specific interventions  Outcome: Progressing     Problem: PAIN - ADULT  Goal: Verbalizes/displays adequate comfort level or patient's stated pain goal  Description: INTERVENTIONS:  - Encourage pt to monitor pain and request assistance  - Assess pain using appropriate pain scale  - Administer analgesics based on type and severity of pain and evaluate response  - Implement non-pharmacological measures as appropriate and evaluate response  - Consider cultural and social influences on pain and pain management  - Manage/alleviate anxiety  - Utilize distraction and/or relaxation techniques  - Monitor for opioid side effects  - Notify MD/LIP if interventions unsuccessful or patient reports new pain  - Anticipate increased pain with activity and pre-medicate as appropriate  Outcome: Progressing     Problem: MUSCULOSKELETAL - ADULT  Goal: Return mobility to safest level of function  Description: INTERVENTIONS:  - Assess patient stability and activity tolerance for standing, transferring and ambulating w/ or w/o assistive devices  - Assist with transfers and ambulation using safe patient handling equipment as needed  - Ensure adequate protection for wounds/incisions during mobilization  - Obtain PT/OT consults as needed  - Advance activity as appropriate  - Communicate ordered activity level and limitations with  patient/family  Outcome: Progressing

## 2024-11-28 NOTE — PLAN OF CARE
Alert and oriented x 4. Vitals stable on room air. Pain managed on PO medications. Dressing to left hip clean, dry, intact. Voiding without difficulty. Last BM 11/25. Tolerating regular diet. SCD's on bilaterally. Safety precautions in place.

## 2024-11-28 NOTE — OCCUPATIONAL THERAPY NOTE
OCCUPATIONAL THERAPY EVALUATION - INPATIENT    Room Number: 379/379-A  Evaluation Date: 11/28/2024     Type of Evaluation: Initial  Presenting Problem: L hip sarah on 11/27 2/2 hip fracture from fall    Physician Order: IP Consult to Occupational Therapy  Reason for Therapy:  ADL/IADL Dysfunction and Discharge Planning    OCCUPATIONAL THERAPY ASSESSMENT   Patient is a 79 year old female admitted on 11/26/2024 with Presenting Problem: L hip sarah on 11/27 2/2 hip fracture from fall. Co-Morbidities : h/o CKD, h/o CVA  Patient is currently functioning near baseline with toileting, upper body dressing, lower body dressing, grooming, bed mobility, transfers, static sitting balance, dynamic sitting balance, static standing balance, dynamic standing balance, maintaining seated position, and functional standing tolerance.  Prior to admission, patient's baseline is Mod I.  Patient met all OT goals at Sup level.  Patient reports no further questions/concerns at this time.       Recommendations for nursing staff:   Transfers: Sup  Toileting location: Toilet    EVALUATION SESSION:  Patient at start of session: supine in bed for session    FUNCTIONAL TRANSFER ASSESSMENT  Sit to Stand: Edge of Bed  Edge of Bed: Supervision  Toilet Transfer: Supervision (simulated at EOB 2/2 higher toilet with BUE arms)  Tub Transfer: Supervision  Simulated Car Transfer: Supervision    BED MOBILITY  Rolling: Supervision  Supine to Sit : Supervision  Scooting: Sup to EOB    BALANCE ASSESSMENT  Static Sitting: Supervision  Static Standing: Supervision (to stand and to amb in room and hallway and gym)    FUNCTIONAL ADL ASSESSMENT  UB Dressing Seated: Supervision (for shirt)  LB Dressing Seated: Supervision (to get socks on and clothes over feet with equipment as needed)  LB Dressing Standing: Supervision (to pull up pants)    ACTIVITY TOLERANCE: vitals stable                         O2 SATURATIONS       COGNITION  Overall Cognitive Status:  WFL -  within functional limits    COGNITION ASSESSMENTS     Upper Extremity:   ROM: within functional limits   Strength: is within functional limits   Coordination:  Gross motor: WNL  Fine motor: WNL  Sensation: Light touch:  intact    EDUCATION PROVIDED  Patient Education : Role of Occupational Therapy; Plan of Care  Patient's Response to Education: Verbalized Understanding; Returned Demonstration    Equipment used: RW  Demonstrates functional use    Therapist comments: Pt reported fatigue at home, pt educated on work simplification and energy conservation for at home to assist with independence with fatigue at home.     Patient End of Session: Up in chair;Needs met;Call light within reach;All patient questions and concerns addressed;Hospital anti-slip socks;Alarm set    OCCUPATIONAL PROFILE    HOME SITUATION  Type of Home: Apartment (going to r's apt per pt)  Home Layout: One level  Lives With: Daughter    Toilet and Equipment: Standard height toilet;Toilet riser with arms  Shower/Tub and Equipment: Shower chair;Tub-shower combo       Occupation/Status: retired  Hand Dominance: Right          Prior Level of Function: Pt typically independent with ADLs and mobility. Pt does not use AD.    SUBJECTIVE  Pt stated, \"I am doing better than I thought.\"    PAIN ASSESSMENT  Rating: 3  Location: surgical  Management Techniques: Activity promotion;Body mechanics;Breathing techniques;Relaxation;Repositioning    OBJECTIVE          WEIGHT BEARING RESTRICTION       AM-PAC ‘6-Clicks’ Inpatient Daily Activity Short Form  -   Putting on and taking off regular lower body clothing?: A Little  -   Bathing (including washing, rinsing, drying)?: A Little  -   Toileting, which includes using toilet, bedpan or urinal? : A Little  -   Putting on and taking off regular upper body clothing?: A Little  -   Taking care of personal grooming such as brushing teeth?: A Little  -   Eating meals?: A Little    AM-PAC Score:  Score: 18  Approx Degree of  Impairment: 46.65%  Standardized Score (AM-PAC Scale): 38.66    ADDITIONAL TESTS     NEUROLOGICAL FINDINGS      PLAN   Patient has been evaluated and presents with no skilled Occupational Therapy needs at this time.  Patient discharged from Occupational Therapy services.  Please re-order if a new functional limitation presents during this admission.         Patient Evaluation Complexity Level:   Occupational Profile/Medical History LOW - Brief history including review of medical or therapy records    Specific performance deficits impacting engagement in ADL/IADL LOW  1 - 3 performance deficits    Client Assessment/Performance Deficits LOW - No comorbidities nor modifications of tasks    Clinical Decision Making LOW - Analysis of occupational profile, problem-focused assessments, limited treatment options    Overall Complexity LOW     OT Session Time: 30 minutes  Self-Care Home Management: 25 minutes  Therapeutic Activity: 0 minutes  Neuromuscular Re-education: 0 minutes  Therapeutic Exercise: 0 minutes  Cognitive Skills: 0 minutes  Sensory Integrative: 0 minutes  Orthotic Management and Trainin minutes  Can add/delete any of these

## 2024-11-29 VITALS
SYSTOLIC BLOOD PRESSURE: 116 MMHG | HEART RATE: 108 BPM | RESPIRATION RATE: 16 BRPM | TEMPERATURE: 98 F | BODY MASS INDEX: 31.34 KG/M2 | DIASTOLIC BLOOD PRESSURE: 59 MMHG | OXYGEN SATURATION: 96 % | WEIGHT: 195 LBS | HEIGHT: 66 IN

## 2024-11-29 LAB
ANION GAP SERPL CALC-SCNC: 7 MMOL/L (ref 0–18)
BUN BLD-MCNC: 26 MG/DL (ref 9–23)
CALCIUM BLD-MCNC: 8.3 MG/DL (ref 8.7–10.4)
CHLORIDE SERPL-SCNC: 111 MMOL/L (ref 98–112)
CO2 SERPL-SCNC: 24 MMOL/L (ref 21–32)
CREAT BLD-MCNC: 1.02 MG/DL
EGFRCR SERPLBLD CKD-EPI 2021: 55 ML/MIN/1.73M2 (ref 60–?)
GLUCOSE BLD-MCNC: 94 MG/DL (ref 70–99)
OSMOLALITY SERPL CALC.SUM OF ELEC: 299 MOSM/KG (ref 275–295)
POTASSIUM SERPL-SCNC: 4.2 MMOL/L (ref 3.5–5.1)
SODIUM SERPL-SCNC: 142 MMOL/L (ref 136–145)

## 2024-11-29 PROCEDURE — 99239 HOSP IP/OBS DSCHRG MGMT >30: CPT | Performed by: HOSPITALIST

## 2024-11-29 RX ORDER — OXYCODONE HYDROCHLORIDE 5 MG/1
5 TABLET ORAL EVERY 4 HOURS PRN
Qty: 20 TABLET | Refills: 0 | Status: SHIPPED | OUTPATIENT
Start: 2024-11-29

## 2024-11-29 RX ORDER — CALCIUM CARBONATE 500 MG/1
500 TABLET, CHEWABLE ORAL EVERY 6 HOURS PRN
Status: DISCONTINUED | OUTPATIENT
Start: 2024-11-29 | End: 2024-11-29

## 2024-11-29 RX ORDER — ASPIRIN 81 MG/1
81 TABLET ORAL 2 TIMES DAILY
Qty: 60 TABLET | Refills: 0 | Status: SHIPPED | OUTPATIENT
Start: 2024-11-29

## 2024-11-29 RX ORDER — CEPHALEXIN 500 MG/1
500 CAPSULE ORAL 2 TIMES DAILY
Qty: 8 CAPSULE | Refills: 0 | Status: SHIPPED | OUTPATIENT
Start: 2024-11-29 | End: 2024-12-03

## 2024-11-29 NOTE — CM/SW NOTE
11/29/24 0900   Discharge Needs   Anticipated D/C needs Home health care   Choice of Post-Acute Provider   Informed patient of right to choose their preferred provider Yes     Met w/pt at the bedside and she provided address for an apartment her daughter owns where pt will  be staying.  [address] [place] [address]    It's a first floor apartment, no stairs  She said daughter will stay with her.    [facility]    / to remain available for support and/or discharge planning.     Judi RODRIGUEZA MSN, RN CTL/  [id number]

## 2024-11-29 NOTE — HOME CARE LIAISON
Received referral via [facility] for Home Health services. Spoke w/ patient via the phone and is agreeable for home health care. Updated demographics as patient will be staying with her daughter in [place] . Will remain available for all and any home health care

## 2024-11-29 NOTE — CM/SW NOTE
11/29/24 1109   Choice of Post-Acute Provider   Informed patient of right to choose their preferred provider Yes   List of appropriate post-acute services provided to patient/family with quality data Yes   Patient/family choice [facility]     Met w/pt at the bedside w/list of HH and she chose [facility].  Reserved in [facility] and notified liaison Sherry of discharge today    / to remain available for support and/or discharge planning.     Judi RODRIGUEZA MSN, RN CTL/  [id number]

## 2024-11-29 NOTE — PAYOR COMM NOTE
--------------  CONTINUED STAY REVIEW    Payor: [employer]  Subscriber #:  [id number]  Authorization Number: [id number]    Admit date: 11/26/24  Admit time: 11:26 AM    REVIEW DOCUMENTATION:      11/28      Chief Complaint: left hip pain        Vital signs:  Temp:  [97 °F (36.1 °C)-98.3 °F (36.8 °C)] 97.6 °F (36.4 °C)  Pulse:  [55-89] 62  Resp:  [11-26] 18  BP: ()/(51-89) 117/66  SpO2:  [93 %-100 %] 99 %     Physical Exam:    General: No acute distress  Respiratory: No wheezes, no rhonchi  Cardiovascular: S1, S2, regular rate and rhythm  Abdomen: Soft, Non-tender, non-distended, positive bowel sounds  Neuro: No new focal deficits.   Extremities: No edema        Diagnostic Data:    Labs:        Recent Labs   Lab 11/26/24  0946 11/27/24 0439 11/28/24  0523   WBC 8.4 10.7  --    HGB 11.7* 10.3* 9.7*   MCV 89.0 91.0  --    .0 207.0  --    INR 1.06  --   --                Recent Labs   Lab 11/26/24  0946 11/27/24 0439 11/28/24  0524   * 112* 117*   BUN 19 32* 32*   CREATSERUM 0.88 0.81 0.97   CA 9.0 8.3* 8.5*   ALB 3.8  --   --     142 141   K 4.2 4.4 4.4    109 108   CO2 29.0 27.0 26.0   ALKPHO 90  --   --    AST 22  --   --    ALT 16  --   --    BILT 0.8  --   --    TP 6.0  --   --               Assessment & Plan:  #LEft hip pain 2/2 fx  S/p nerve block in ER  S/p Left hip bipolar hemiarthroplasty      PT/OT  #Hypertension  Continue home meds  stable  #Hypothyroid on Synthroid  # UTI  E coli S to all abx     Plan of care discussed with pt         Marlene Shah MD         MEDICATIONS ADMINISTERED IN LAST 1 DAY:  acetaminophen (Tylenol Extra Strength) tab 1,000 mg       Date Action Dose Route User    11/29/2024 0539 Given 1,000 mg Oral Dread Jaime, RN          aspirin DR tab 81 mg       Date Action Dose Route User    11/29/2024 0849 Given 81 mg Oral Magnolia Burton RN    11/28/2024 2109 Given 81 mg Oral Dread Jaime, RN          atorvastatin (Lipitor) tab 40 mg        Date Action Dose Route User    11/28/2024 2109 Given 40 mg Oral Dread Jaime RN          calcium carbonate (Tums) chewable tab 500 mg       Date Action Dose Route User    11/29/2024 0945 Given 500 mg Oral Magnolia Burton RN          levothyroxine (Synthroid) tab 50 mcg       Date Action Dose Route User    11/29/2024 0539 Given 50 mcg Oral Dread Jaime RN          lisinopril (Prinivil; Zestril) tab 5 mg       Date Action Dose Route User    11/29/2024 0849 Given 5 mg Oral Magnolia Burton RN          metoprolol tartrate (Lopressor) partial tab 12.5 mg       Date Action Dose Route User    11/29/2024 0539 Given 12.5 mg Oral Dread Jaime RN    11/28/2024 1835 Given 12.5 mg Oral Millicent Ward RN          naproxen (Naprosyn) tab 500 mg       Date Action Dose Route User    11/29/2024 0850 Given 500 mg Oral Magnolia Burton RN    11/28/2024 1835 Given 500 mg Oral Millicent Ward RN          ondansetron (Zofran) 4 MG/2ML injection 4 mg       Date Action Dose Route User    11/28/2024 1752 Given 4 mg Intravenous Millicent Ward RN          polyethylene glycol (PEG 3350) (Miralax) 17 g oral packet 17 g       Date Action Dose Route User    11/29/2024 0604 Given 17 g Oral Dread Jaime RN            cefTRIAXone (Rocephin) 1 g in sodium chloride 0.9% 100 mL IVPB-ADDV  Dose: 1 g  Freq: Every 24 hours Route: IV  Last Dose: 1 g (11/28/24 1232)  Start: 11/26/24 1245   Admin Instructions:   Ceftriaxone must NOT be administered simultaneously with calcium containing IV solutions. Includes Y-site as well.  In patients other than neonates ceftriaxone and calcium containing products may administered sequentially, provided the line is flushed in between administrations.   Order specific questions:             1257 KK-New Bag      1224 EP-New Bag      1232 EP-New Bag      1245             ketorolac (Toradol) 15 MG/ML injection 15 mg  Dose: 15 mg  Freq: Every 6 hours Route: IV  Start: 11/27/24 1824 End: 11/28/24 1232           1833  EP-Given      0102 AB-Given     0547 AB-Given     1232 EP-Given              ondansetron (Zofran) 4 MG/2ML injection 4 mg  Dose: 4 mg  Freq: Every 6 hours PRN Route: IV  PRN Reasons: Nausea,vomiting  Start: 11/26/24 1127   Admin Instructions:   Default antiemetic sequence (unless otherwise preferred by patient):  1. ondansetron (Zofran) 2. metoclopramide (Reglan)  Wait 15 minutes before proceeding to next medication in sequence.  Follow therapeutic duplication policy.           1402 UE-MAR Hold     1824 EP-MAR Unhold     1834 EP-Given      1752 EP-Given            Or   oxyCODONE immediate release tab 5 mg  Dose: 5 mg  Freq: Every 4 hours PRN Route: OR  PRN Reason: severe pain  Start: 11/27/24 1824   Admin Instructions:   Use PRN reason as a guide and follow range order policy            1054 EP-Given              Vitals (last day)       Date/Time Temp Pulse Resp BP SpO2 Weight O2 Device O2 Flow Rate (L/min) [facility]    11/29/24 0738 97.7 °F (36.5 °C) 108 16 116/59 96 % -- None (Room air) --     11/29/24 0400 97.7 °F (36.5 °C) 68 16 155/54 96 % -- None (Room air) -- NH    11/29/24 0000 97.9 °F (36.6 °C) 66 18 122/42 97 % -- None (Room air) 0 L/min [facility]    11/28/24 2000 97.6 °F (36.4 °C) 58 16 110/44 96 % -- None (Room air) -- NH    11/28/24 1207 97.8 °F (36.6 °C) 58 18 109/84 94 % -- None (Room air) -- NS    11/28/24 0800 97.6 °F (36.4 °C) 62 18 117/66 99 % -- None (Room air) -- NS    11/28/24 0445 98.3 °F (36.8 °C) 57 16 131/69 96 % -- None (Room air) -- AB

## 2024-11-29 NOTE — DISCHARGE INSTRUCTIONS
Home Health Provider  Sometimes managing your health at home requires assistance.  The [facility] team has recognized your preference to use [facility].  They can be reached by phone at [phone] The fax number for your reference is [phone].. A representative from the home health agency will contact you or your family to schedule your first visit.      Hip Fracture Discharge Instructions    Activity    Weight bearing restrictions:      Weight bearing as tolerated (WBAT) ? you may put as much of your weight on your leg as you can tolerate. This means you may use a walker,   Bathing  No tub baths, pools, or saunas until cleared by surgeon (about 4-6 weeks because it takes that long for the incision(s) on the skin to heal and be a barrier to prevent infection.)    When allowed to shower:  AQUACEL dressing is waterproof and does not require being covered before showering.  Pat dressing(s) and surrounding skin dry after shower.   There may be one incision or a few that have a dressing.                                      AQUACEL           MEDIPORE/COVERLET           DRY STERILE GAUZE                                                                                                                         MEDIPORE/COVERLET dressing and dry sterile gauze are NOT waterproof and REQUIRE being covered with a waterproof barrier to keep the dressing and incision dry.  SARAN WRAP, GLAD WRAP, PRESS N SEAL WORK REALLY WELL BUT ANY PLASTIC WRAP WILL DO.  Do not wash incision.   Remove entire wrapping and old dressing (if Medipore/coverlet or gauze) after showering. Pat dry with a CLEAN TOWEL if necessary and cover incision with new Medipore/coverlet or gauze.    Incision care/Dressing changes  Wash hands before and after dressing changes.  There may be one or a few dressings on the hip/leg    FOR MEDIPORE/COVERLET DRESSINGS:  Change dressing daily using Medipore/coverlet once Aquacel (waterproof)  dressing is removed (which is about 7 days after surgery). Patient should be standing or lying flat so dressing goes on smoothly.  (This dressing needed for hip patients because of location of incision-don’t want contamination from bathroom use)  FOR DRY STERILE GAUZE DRESSINGS:   Change dressing daily using dry sterile gauze and paper tape.  Watch ALL incision for any redness, drainage, increased warmth or opening of the incision.   Call surgeon if you notice any of these.  No lotions or ointments on or near incision(s).                             DRY STERILE GAUZE                         MEDIPORE /COVERLET    Continue dressing changes until your first visit with your surgeon’s office.  There could be a small amount of redness around the staples or incision; this is normal.  Watch for increased redness, warmth, any odor, increased drainage or opening of the incision. A little clear yellow or blood tinged drainage is normal up to 2 weeks after surgery but it should be less every day until it stops.  Call physician if you notice any concerning changes.  Sutures/staples will be removed at first office visit (10 days- 3 weeks).       Driving  Do not drive until cleared by surgeon. Possibly six weeks after surgery. Discuss this at follow-up office visit.   Not allowed while taking narcotic pain medication or muscle relaxants.    Sex  Usually allowed after six weeks - check with surgeon at your office visit.    Return to work  Usually allowed after six weeks. Discuss specific work activities with your surgeon.    Restrictions  Follow instructions provided by physical therapy    No smoking  Avoid smoking. It is known to cause breathing problems and can decrease the rate of healing.                      Medication  Anticoagulants = blood thinners (Xarelto, Eliquis, Lovenox, Coumadin or Aspirin)  Pill or shot form depending on what your physician orders.   IF placed on Coumadin, you may also need lab work done for  monitoring.  You will bleed easier and bruise easier while on these medications.   Usually you will be on a blood thinner for about 4-5 weeks after surgery.  Contact your physician if you have signs of bruising, nose bleeds or blood in your urine. Use electric razors and soft toothbrushes only.  Do not take NSAIDS (non-steroidal anti-inflammatory medications) while taking blood thinners unless ordered by your surgeon.  Review anticoagulant education information sheet provided.    Discomfort  Surgical discomfort is normal for one to two months.  Have realistic goals and keep a positive outlook.  Keep pain manageable; pain should not disrupt your sleep or activities like getting out of bed or walking.  You may need pain medication regularly (every 4-6 hours) the first 2 weeks and then begin to decrease how often you are taking it.  Take pain medication as prescribed with food, especially before therapy, allowing 30-60 minutes to take effect.  Do not drink alcohol while on pain medication.  As you have less discomfort, decrease the amount of pain medication you take. Use plain Tylenol (acetaminophen) for less severe pain.  Some pain medications have Tylenol (acetaminophen) in them such as Norco and Percocet. Do NOT take Tylenol (acetaminophen) within 4 hours of a dose of these medications.  Apply ice or cold therapy to surgical site for 20 minutes at least four times a day, especially after therapy. Be sure there is a thin cloth barrier between skin and ice or cold therapy.  Change position at least every 45 minutes while awake to avoid stiffness or increased discomfort.  Deep breathing and relaxation techniques and distractions can help!  If you focus on something else, you do not experience the pain the same. Take advantage of everything available to your to help control you discomfort.  Contact physician if discomfort does not respond to pain medication.    Body changes  Constipation is common with the use of  narcotics.  Eat fiber rich foods and drink plenty of fluids, at least 4-6 glasses of water a day, unless restricted.  To prevent constipation, use stool softeners such as Colace and laxatives such as Miralax, Senakot or Milk of Magnesia while taking laxatives.   An enema or suppository may be needed if above measures do not work.    Prevention of infection and promotion of healing  Good hand washing is important. Everyone should wash their hands or use hand  as soon as they walk in your house-whether they live there or are visiting.  Keep bed linen/clothing freshly laundered.  Do not allow others or pets to touch your incision.  Avoid people that have colds or the flu.  Your surgeon may recommend that you take antibiotics before you undergo any dental or other invasive surgical procedures after your hip fracture surgery. Speak with your surgeon about this at your post-op office visit.  Continue using incentive spirometry because narcotics make you sleepy so you may not take good deep breaths. We do not want you to get pneumonia.     Post op Office visits  Schedule 10 days to 3 weeks after surgery WITH SURGEON’s office.  Additional visits may need to be scheduled. Your physician will discuss this at first post-op office visit.  Schedule outpatient physical therapy per your surgeon’s orders.  Schedule one week follow up after surgery WITH PRIMARY CARE PHYSICIAN; review your medications over last 6 months.  Your body gets stressed by surgery and that stress can affect all your other health issues (such as high blood pressure, diabetes, CHF, afib, and asthma just to name a few).  It is much better to catch developing problems and prevent them from becoming larger ones.  GRAHAM HOSE - IF ordered by your surgeon, wear these during the day and off at night.  Surgeon will tell you when you don’t need them anymore.    Notify your surgeon if you notice any of the following signs  Separation of incision line.  Increased  redness, swelling, or warmth of skin around incision.  Increased or foul smelling drainage from incision  Red streaks on skin near incision.  Temperature >100.4F.  Increased pain at incision not relieved by pain medication.    Signs of Possible Hip Dislocation IF Total Hip Replacement or Sherwin-arthroplasty Done  Increased severe leg or groin pain  Turning in or out of surgical leg that is new  Increased numbness, tingling to leg  Inability to walk or put weight on your surgical leg  Signs of blood clot  Pain, excessive tenderness, redness, or swelling in leg or calf (other than incision site).  Call physician and await their directions.    Go directly to the ER or CALL 911 if you:  become short of breath  have chest pain  cough up blood  have unexplained anxiety with breathing     Traveling and Handicapped parking  Check with you surgeon when you are allowed to travel so you don’t set yourself up for greater chance of complications.  If traveling by car, get out to stretch every 2 hours.  This helps prevent stiffness.  You may need to do this any time you travel for the first year after surgery.  If traveling by plane, BEFORE you get into a security line, let them know that you had your hip replaced, as you will most likely set off the metal detector. The doctors no longer provide an identification card for this as they are easily copied. ALSO request a wheelchair the first year to board and get off a plane…this aids in priority seating and you should sit on the aisle or at the bulkhead where you can easily stretch your legs and get up to walk up and down the aisles…this helps prevent blood clots and stiffness.  TEMPORARY HANDICAP PARKING APPLICATION  (good for 3-6 months)  - At Surgeon or PCP visit, request they fill out their part of the form. You fill our your portion, then go to Department of [employer]. Some [facility] offices provide the same service. ([place] [place] and [place] have this service; if  you live in another township, you may check with them as well). You need space to open car doors to position yourself properly with walker to get in and out of your car safely; some parking spaces are  practically on top of each other and do not give you enough room.     SPECIAL  INSTRUCTIONS:

## 2024-11-29 NOTE — PLAN OF CARE
Alert and Oriented x4. On RA. VSS. Dressing to left Hip in place, C/D/I; Denies pain at this time. Denies N/T. Voiding freely. Tolerating diet. Denies N/V. Call light within reach at this time.    Plan: PT= , pain management

## 2024-11-29 NOTE — PLAN OF CARE
Patient is alert and oriented x 4. Vitals stable on room air. Reports mild pain managed by scheduled meds. Denies numbness & tingling. Up min assist with walker. Last BM 11/25/24, patient declined suppository. Plan for discharge with HH. Plan of care discussed with patient.

## 2024-11-29 NOTE — PROGRESS NOTES
AVS reviewed, IV dc'd, verbalized understanding of dc instructions, will dc home w/ [facility] once meds delivered to bedside.

## 2024-12-01 ENCOUNTER — TELEPHONE (OUTPATIENT)
Dept: ORTHOPEDICS CLINIC | Facility: CLINIC | Age: 79
End: 2024-12-01

## 2024-12-01 DIAGNOSIS — Z48.89 ENCOUNTER FOR POSTOPERATIVE CARE: Primary | ICD-10-CM

## 2024-12-01 NOTE — DISCHARGE SUMMARY
[facility]     Monica Nieto Patient Status:  Inpatient    1945 MRN [id number]   Location [facility] 3SW-A Attending No att. providers found   Hosp Day # 3 PCP Rosette Ivy MD     Date of Admission: 2024  Date of Discharge:  2024     Discharge Disposition: Home Health Care Services    Discharge Diagnosis:  #LEft hip pain 2/2 fx  S/p nerve block in ER  S/p Left hip bipolar hemiarthroplasty      PT/OT  #Hypertension  Continue home meds  stable  #Hypothyroid on Synthroid  # UTI  E coli S to all abx    History of Present Illness: 79 year old female with  h/o HTN, Thyroid dx. Patient is presenting with LEft hip pain- she  fell on Saturday onto her left side and since that has had increasing pain. She finally came to ER as she could no longer bear weight.     Brief Synopsis: Patient underwent left hip bipolar hemiarthroplasty without complications.    Lace+ Score: 70  59-90 High Risk  29-58 Medium Risk  0-28   Low Risk       TCM Follow-Up Recommendation:  LACE > 58: High Risk of readmission after discharge from the hospital.      Procedures during hospitalization:       Incidental or significant findings and recommendations (brief descriptions):      Lab/Test results pending at Discharge:       Consultants:  ortopedics    Discharge Medication List:     Discharge Medications        START taking these medications        Instructions Prescription details   aspirin 81 MG Tbec  Replaces: aspirin 325 MG Tabs      Take 1 tablet (81 mg total) by mouth in the morning and 1 tablet (81 mg total) before bedtime.   Quantity: 60 tablet  Refills: 0     cephALEXin 500 MG Caps  Commonly known as: Keflex      Take 1 capsule (500 mg total) by mouth 2 (two) times daily for 4 days.   Stop taking on: December 3, 2024  Quantity: 8 capsule  Refills: 0     oxyCODONE 5 MG Tabs      Take 1 tablet (5 mg total) by mouth every 4 (four) hours as needed.   Quantity: 20 tablet  Refills: 0             CONTINUE taking these medications        Instructions Prescription details   atorvastatin 40 MG Tabs  Commonly known as: Lipitor      Take 1 tablet (40 mg total) by mouth nightly.   Quantity: 90 tablet  Refills: 0     CALCIUM OR      Take 1 tablet by mouth daily.   Refills: 0     COD LIVER OR      Take 1 tablet by mouth daily.   Refills: 0     diclofenac 1 % Gel  Commonly known as: Voltaren      Apply 2 g topically 2 (two) times daily as needed.   Quantity: 50 g  Refills: 0     levothyroxine 50 MCG Tabs  Commonly known as: Synthroid      Take 1 tablet (50 mcg total) by mouth before breakfast.   Quantity: 90 tablet  Refills: 2     lisinopril 5 MG Tabs  Commonly known as: Prinivil; Zestril      Take 1 tablet (5 mg total) by mouth daily.   Quantity: 90 tablet  Refills: 1     MAGNESIUM OR      Take 1 tablet by mouth daily.   Refills: 0     metoprolol tartrate 25 MG Tabs  Commonly known as: Lopressor      TAKE 1/2 TABLET(12.5 MG) BY MOUTH TWICE DAILY   Quantity: 90 tablet  Refills: 1     Multi-Vitamin Tabs      Take 1 tablet by mouth daily.   Refills: 0     VITAMIN D-3 OR      Take 1 tablet by mouth daily.   Refills: 0            STOP taking these medications      aspirin 325 MG Tabs  Replaced by: aspirin 81 MG Tbec        Naproxen Sodium 220 MG Caps                  Where to Get Your Medications        These medications were sent to [place], [place] - [address] [facility] [address] [phone], [phone]  [address] [address], [facility] [address]      Phone: [phone]   oxyCODONE 5 MG Tabs       Please  your prescriptions at the location directed by your doctor or nurse    Bring a paper prescription for each of these medications  aspirin 81 MG Tbec  cephALEXin 500 MG Caps         [place] reviewed[place]     Follow-up appointment:   Rosette Ivy MD  [address] [facility]  [address]  [facility] [address] [phone]    Follow up in 1 week(s)      Valdez Anna MD  [address] [place] [address]  [address]  [facility] [address]  [phone]    Follow up in 1 week(s)      Appointments for Next 30 Days 2024 - 2024      None            Vital signs:       Physical Exam:    General: No acute distress   Lungs: clear to auscultation  Cardiovascular: S1, S2  Abdomen: Soft      -----------------------------------------------------------------------------------------------  PATIENT DISCHARGE INSTRUCTIONS: See electronic chart    Marlene Shah MD    Total time spent on discharge plannin minutes     The  Cures Act makes medical notes like these available to patients in the interest of transparency. Please be advised this is a medical document. Medical documents are intended to carry relevant information, facts as evident, and the clinical opinion of the practitioner. The medical note is intended as peer to peer communication and may appear blunt or direct. It is written in medical language and may contain abbreviations or verbiage that are unfamiliar.

## 2024-12-02 NOTE — TELEPHONE ENCOUNTER
December 2, 2024  Valedz Anna MD to [facility]       12/2/24  2:13 PM  Note     Done.      Dara Allen MA to Valdez Anna MD   [place]      12/2/24  1:59 PM  Please place order for XR you would like for this patient. Thanks!      Rose Henriquez to [facility]   [place]    12/2/24  1:36 PM  Scheduled patient. Patient knows to come in early for xrays, there is currently no xrays ordered. Please advise  Future Appointments  12/16/2024 1:30 PM    Valdez Anna MD          [id number]      December 1, 2024  Valdez Anna MD to [facility]  Pool         12/1/24  9:07 PM  Please schedule post op with me in 2-3 weeks. XR prior.

## 2024-12-03 ENCOUNTER — PATIENT OUTREACH (OUTPATIENT)
Dept: CASE MANAGEMENT | Age: 79
End: 2024-12-03

## 2024-12-03 NOTE — PROGRESS NOTES
Attempted to contact pt for TCM however no answer. Call continued to ring and did not go to a . [facility] to try again at a later time.

## 2024-12-03 NOTE — PROGRESS NOTES
[employer] message sent to patient for transitional care management outreach.

## 2024-12-06 NOTE — PAYOR COMM NOTE
--------------  DISCHARGE REVIEW    Payor: [employer] MEDICARE ADV PPO  Subscriber #:  [id number]  Authorization Number: [id number]    Admit date: 24  Admit time:  11:26 AM  Discharge Date: 2024  4:47 PM     Admitting Physician: Laura Perez MD  Attending Physician:  No att. providers found  Primary Care Physician: Rosette Ivy MD          Discharge Summary Notes        Discharge Summary signed by Marlene Shah MD at 2024  3:40 PM       Author: Marlene Shah MD Specialty: HOSPITALIST, Internal Medicine Author Type: Physician    Filed: 2024  3:40 PM Date of Service: 2024  3:37 PM Status: Signed    : Marlene Shah MD (Physician)           [facility]     Monica Nieto Patient Status:  Inpatient    1945 MRN [id number]   Location [facility] 3SW-A Attending No att. providers found   Hosp Day # 3 PCP Rosette Ivy MD     Date of Admission: 2024  Date of Discharge:  2024     Discharge Disposition: Home Health Care Services    Discharge Diagnosis:  #LEft hip pain 2/2 fx  S/p nerve block in ER  S/p Left hip bipolar hemiarthroplasty      PT/OT  #Hypertension  Continue home meds  stable  #Hypothyroid on Synthroid  # UTI  E coli S to all abx    History of Present Illness: 79 year old female with  h/o HTN, Thyroid dx. Patient is presenting with LEft hip pain- she  fell on Saturday onto her left side and since that has had increasing pain. She finally came to ER as she could no longer bear weight.     Brief Synopsis: Patient underwent left hip bipolar hemiarthroplasty without complications.    Lace+ Score: 70  59-90 High Risk  29-58 Medium Risk  0-28   Low Risk       TCM Follow-Up Recommendation:  LACE > 58: High Risk of readmission after discharge from the hospital.      Procedures during hospitalization:       Incidental or significant findings and recommendations (brief descriptions):      Lab/Test results pending at Discharge:        Consultants:  ortopedics    Discharge Medication List:     Discharge Medications        START taking these medications        Instructions Prescription details   aspirin 81 MG Tbec  Replaces: aspirin 325 MG Tabs      Take 1 tablet (81 mg total) by mouth in the morning and 1 tablet (81 mg total) before bedtime.   Quantity: 60 tablet  Refills: 0     cephALEXin 500 MG Caps  Commonly known as: Keflex      Take 1 capsule (500 mg total) by mouth 2 (two) times daily for 4 days.   Stop taking on: December 3, 2024  Quantity: 8 capsule  Refills: 0     oxyCODONE 5 MG Tabs      Take 1 tablet (5 mg total) by mouth every 4 (four) hours as needed.   Quantity: 20 tablet  Refills: 0            CONTINUE taking these medications        Instructions Prescription details   atorvastatin 40 MG Tabs  Commonly known as: Lipitor      Take 1 tablet (40 mg total) by mouth nightly.   Quantity: 90 tablet  Refills: 0     CALCIUM OR      Take 1 tablet by mouth daily.   Refills: 0     COD LIVER OR      Take 1 tablet by mouth daily.   Refills: 0     diclofenac 1 % Gel  Commonly known as: Voltaren      Apply 2 g topically 2 (two) times daily as needed.   Quantity: 50 g  Refills: 0     levothyroxine 50 MCG Tabs  Commonly known as: Synthroid      Take 1 tablet (50 mcg total) by mouth before breakfast.   Quantity: 90 tablet  Refills: 2     lisinopril 5 MG Tabs  Commonly known as: Prinivil; Zestril      Take 1 tablet (5 mg total) by mouth daily.   Quantity: 90 tablet  Refills: 1     MAGNESIUM OR      Take 1 tablet by mouth daily.   Refills: 0     metoprolol tartrate 25 MG Tabs  Commonly known as: Lopressor      TAKE 1/2 TABLET(12.5 MG) BY MOUTH TWICE DAILY   Quantity: 90 tablet  Refills: 1     Multi-Vitamin Tabs      Take 1 tablet by mouth daily.   Refills: 0     VITAMIN D-3 OR      Take 1 tablet by mouth daily.   Refills: 0            STOP taking these medications      aspirin 325 MG Tabs  Replaced by: aspirin 81 MG Tbec        Naproxen Sodium 220  MG Caps                  Where to Get Your Medications        These medications were sent to [facility] [address], [address] [phone], [phone]  [address], [address], [facility] [address]      Phone: [phone]   oxyCODONE 5 MG Tabs       Please  your prescriptions at the location directed by your doctor or nurse    Bring a paper prescription for each of these medications  aspirin 81 MG Tbec  cephALEXin 500 MG Caps         [place]     Follow-up appointment:   Rosette Ivy MD  [address] [facility]  [address]  [facility] [address] [phone]    Follow up in 1 week(s)      Valdez Anna MD  [address] [place] [address]  [address]  [facility] [id number] [phone]    Follow up in 1 week(s)      Appointments for Next 30 Days 2024 - 2024      None            Vital signs:       Physical Exam:    General: No acute distress   Lungs: clear to auscultation  Cardiovascular: S1, S2  Abdomen: Soft      -----------------------------------------------------------------------------------------------  PATIENT DISCHARGE INSTRUCTIONS: See electronic chart    Marlene Shah MD    Total time spent on discharge plannin minutes     The  [place] Cures Act makes medical notes like these available to patients in the interest of transparency. Please be advised this is a medical document. Medical documents are intended to carry relevant information, facts as evident, and the clinical opinion of the practitioner. The medical note is intended as peer to peer communication and may appear blunt or direct. It is written in medical language and may contain abbreviations or verbiage that are unfamiliar.       Electronically signed by Marlene Shah MD on 2024  3:40 PM         REVIEWER COMMENTS

## 2024-12-10 ENCOUNTER — OFFICE VISIT (OUTPATIENT)
Dept: INTERNAL MEDICINE CLINIC | Facility: CLINIC | Age: 79
End: 2024-12-10
Payer: MEDICARE

## 2024-12-10 VITALS
SYSTOLIC BLOOD PRESSURE: 122 MMHG | HEIGHT: 66 IN | HEART RATE: 86 BPM | WEIGHT: 195 LBS | RESPIRATION RATE: 16 BRPM | DIASTOLIC BLOOD PRESSURE: 72 MMHG | BODY MASS INDEX: 31.34 KG/M2 | OXYGEN SATURATION: 100 % | TEMPERATURE: 98 F

## 2024-12-10 DIAGNOSIS — Z09 HOSPITAL DISCHARGE FOLLOW-UP: Primary | ICD-10-CM

## 2024-12-10 DIAGNOSIS — E78.5 DYSLIPIDEMIA: ICD-10-CM

## 2024-12-10 DIAGNOSIS — E11.65 TYPE 2 DIABETES MELLITUS WITH HYPERGLYCEMIA, WITHOUT LONG-TERM CURRENT USE OF INSULIN (HCC): ICD-10-CM

## 2024-12-10 DIAGNOSIS — D64.9 ANEMIA FOLLOWING SURGERY: ICD-10-CM

## 2024-12-10 DIAGNOSIS — I10 ESSENTIAL HYPERTENSION: ICD-10-CM

## 2024-12-10 DIAGNOSIS — S72.002A LEFT DISPLACED FEMORAL NECK FRACTURE (HCC): ICD-10-CM

## 2024-12-10 DIAGNOSIS — Z96.642 S/P TOTAL LEFT HIP ARTHROPLASTY: ICD-10-CM

## 2024-12-10 DIAGNOSIS — N18.31 STAGE 3A CHRONIC KIDNEY DISEASE (HCC): Chronic | ICD-10-CM

## 2024-12-10 DIAGNOSIS — E03.9 ACQUIRED HYPOTHYROIDISM: ICD-10-CM

## 2024-12-10 PROCEDURE — 3074F SYST BP LT 130 MM HG: CPT | Performed by: NURSE PRACTITIONER

## 2024-12-10 PROCEDURE — 1159F MED LIST DOCD IN RCRD: CPT | Performed by: NURSE PRACTITIONER

## 2024-12-10 PROCEDURE — 3008F BODY MASS INDEX DOCD: CPT | Performed by: NURSE PRACTITIONER

## 2024-12-10 PROCEDURE — 1170F FXNL STATUS ASSESSED: CPT | Performed by: NURSE PRACTITIONER

## 2024-12-10 PROCEDURE — 1111F DSCHRG MED/CURRENT MED MERGE: CPT | Performed by: NURSE PRACTITIONER

## 2024-12-10 PROCEDURE — 99495 TRANSJ CARE MGMT MOD F2F 14D: CPT | Performed by: NURSE PRACTITIONER

## 2024-12-10 PROCEDURE — 3078F DIAST BP <80 MM HG: CPT | Performed by: NURSE PRACTITIONER

## 2024-12-10 NOTE — PROGRESS NOTES
Subjective:   Monica Nieto is a 79 year old female who presents for hospital follow up.   She was discharged from [facility] to Home Health Care Services  Admission Date: 11/26/24   Discharge Date: 11/29/24  Hospital Discharge Diagnosis: Left displaced femoral neck fracture    Interactive contact within 2 business days post discharge first initiated on Date: 12/3/2024    I accessed [facility] and/or Care Everywhere and personally reviewed the following for the recent hospitalization: provider notes, consults, summaries, labs and other test results and the pertinent findings are documented below.     HPI per hospital discharge: 79 year old female with  h/o HTN, Thyroid dx. Patient is presenting with LEft hip pain- she  fell on Saturday onto her left side and since that has had increasing pain. She finally came to ER as she could no longer bear weight.     Brief Synopsis: Patient underwent left hip bipolar hemiarthroplasty without complications.    HPI: She is doing fairly well post-op. Her incision is healing well, denies any drainage or redness. She completed  PT and will be starting outpatient PT soon. She is staying with her daughter currently. Using a walker to ambulate. Pain is minimal about 3/10. She is taking oxycodone only at night but will start weaning off it starting today. Does have mild swelling to BLE, no calf pain or redness. No chest pain, shortness of breath, or fevers.     History/Other:   Current Medications:  Medication Reconciliation:  I am aware of an inpatient discharge within the last 30 days.  The discharge medication list has been reconciled with the patient's current medication list and reviewed by me. See medication list for additions of new medication, and changes to current doses of medications and discontinued medications.  Outpatient Medications Marked as Taking for the 12/10/24 encounter (Office Visit) with Elizabeth Bedolla APRN   Medication Sig    aspirin 81 MG Oral Tab EC Take 1 tablet  (81 mg total) by mouth in the morning and 1 tablet (81 mg total) before bedtime.    oxyCODONE 5 MG Oral Tab Take 1 tablet (5 mg total) by mouth every 4 (four) hours as needed.    MAGNESIUM OR Take 1 tablet by mouth daily.    Multiple Vitamin (MULTI-VITAMIN) Oral Tab Take 1 tablet by mouth daily.    Cod Liver Oil (COD LIVER OR) Take 1 tablet by mouth daily.    CALCIUM OR Take 1 tablet by mouth daily.    Cholecalciferol (VITAMIN D-3 OR) Take 1 tablet by mouth daily.    lisinopril 5 MG Oral Tab Take 1 tablet (5 mg total) by mouth daily.    metoprolol tartrate 25 MG Oral Tab TAKE 1/2 TABLET(12.5 MG) BY MOUTH TWICE DAILY    atorvastatin 40 MG Oral Tab Take 1 tablet (40 mg total) by mouth nightly.    levothyroxine 50 MCG Oral Tab Take 1 tablet (50 mcg total) by mouth before breakfast.    diclofenac 1 % External Gel Apply 2 g topically 2 (two) times daily as needed.       Review of Systems:  GENERAL: weight stable, energy stable, no sweating  SKIN: denies any unusual skin lesions  EYES: denies blurred vision or double vision  HEENT: denies nasal congestion, sinus pain or ST  LUNGS: denies shortness of breath with exertion  CARDIOVASCULAR: denies chest pain on exertion or palpitations  GI: denies abdominal pain, denies heartburn, denies diarrhea  MUSCULOSKELETAL: left hip pain, using walker to ambulate+  NEURO: denies headaches, denies dizziness, denies weakness  PSYCHE: denies depression or anxiety  HEMATOLOGIC: hx of anemia+, denies bruising, denies bleeding  ENDOCRINE: denies thyroid history  ALL/ASTHMA: denies hx of allergy or asthma    Objective:   No LMP recorded. Patient is postmenopausal.  Estimated body mass index is 31.47 kg/m² as calculated from the following:    Height as of this encounter: 5' 6\" (1.676 m).    Weight as of this encounter: 195 lb (88.5 kg).   /72 (BP Location: Left arm, Patient Position: Sitting, Cuff Size: large)   Pulse 86   Temp 97.6 °F (36.4 °C) (Temporal)   Resp 16   Ht 5' 6\"  (1.676 m)   Wt 195 lb (88.5 kg)   SpO2 100%   BMI 31.47 kg/m²    GENERAL: well developed, well nourished, in no apparent distress  SKIN: incision to the left hip with steri strips healing well, no surrounding erythema or drainage.   HEENT: atraumatic, normocephalic  EYES: PERRLA, EOMI, conjunctiva are clear  NECK: supple, no adenopathy, no bruits  CHEST: no chest tenderness  LUNGS: clear to auscultation  CARDIO: RRR without murmur  GI: good BS's, no masses, HSM or tenderness  MUSCULOSKELETAL: back is not tender, FROM of the extremities  EXTREMITIES: no cyanosis, clubbing. +1 edema to BLE  NEURO: Oriented times three, cranial nerves are intact, motor and sensory are grossly intact    Assessment & Plan:   1. Hospital discharge follow-up (Primary)  2. Left displaced femoral neck fracture (HCC)  3. S/P total left hip arthroplasty  -Incision c/d/I  -Continue daily dressing change  -Monitor for s/s of infection  -See surgeon as planned next week  -Will be starting outpatient PT after next week    4. Anemia following surgery  -Repeat CBC in 2 weeks   -     CBC With Differential With Platelet; Future; Expected date: 12/10/2024    5. Essential hypertension  -BP stable, CPM    6. Dyslipidemia  -Continue statin    7. Type 2 diabetes mellitus with hyperglycemia, without long-term current use of insulin (HCC)  -Continue current meds  -Reminded patient to schedule her eye exam    8. Stage 3a chronic kidney disease (HCC)  -Stable, continue to monitor    9. Acquired hypothyroidism  -Continue levothyroxine       Return if symptoms worsen or fail to improve.

## 2024-12-11 NOTE — PROGRESS NOTES
Patient went in for hospital follow-up appointment with primary care provider office on 12/10/24.  Encounter closing.

## 2024-12-12 ENCOUNTER — TELEPHONE (OUTPATIENT)
Dept: ORTHOPEDICS CLINIC | Facility: CLINIC | Age: 79
End: 2024-12-12

## 2024-12-12 DIAGNOSIS — Z48.89 OTHER SPECIFIED AFTERCARE FOLLOWING SURGERY: ICD-10-CM

## 2024-12-12 DIAGNOSIS — S72.002A LEFT DISPLACED FEMORAL NECK FRACTURE (HCC): ICD-10-CM

## 2024-12-12 DIAGNOSIS — S72.002D CLOSED FRACTURE OF LEFT HIP WITH ROUTINE HEALING, SUBSEQUENT ENCOUNTER: Primary | ICD-10-CM

## 2024-12-12 NOTE — TELEPHONE ENCOUNTER
Called and spoke with the patient.  She would like to go to [facility] in [place] on [address].    [facility] was called and fax number received.     [phone]     Order faxed.

## 2024-12-12 NOTE — TELEPHONE ENCOUNTER
Patient is requesting an order for PT.  Order pending, please review/revise as appropriate.  Thank you!      DOS 11/27/24  Left Hip Hemiarthroplasty

## 2024-12-16 ENCOUNTER — HOSPITAL ENCOUNTER (OUTPATIENT)
Dept: GENERAL RADIOLOGY | Age: 79
Discharge: HOME | End: 2024-12-16
Attending: ORTHOPAEDIC SURGERY
Payer: MEDICARE

## 2024-12-16 ENCOUNTER — OFFICE VISIT (OUTPATIENT)
Dept: ORTHOPEDICS CLINIC | Facility: CLINIC | Age: 79
End: 2024-12-16
Payer: MEDICARE

## 2024-12-16 VITALS — WEIGHT: 195 LBS | HEIGHT: 66 IN | BODY MASS INDEX: 31.34 KG/M2

## 2024-12-16 DIAGNOSIS — Z48.89 ENCOUNTER FOR POSTOPERATIVE CARE: ICD-10-CM

## 2024-12-16 DIAGNOSIS — S72.002D CLOSED FRACTURE OF LEFT HIP WITH ROUTINE HEALING, SUBSEQUENT ENCOUNTER: Primary | ICD-10-CM

## 2024-12-16 PROCEDURE — 1125F AMNT PAIN NOTED PAIN PRSNT: CPT | Performed by: ORTHOPAEDIC SURGERY

## 2024-12-16 PROCEDURE — 1111F DSCHRG MED/CURRENT MED MERGE: CPT | Performed by: ORTHOPAEDIC SURGERY

## 2024-12-16 PROCEDURE — 3008F BODY MASS INDEX DOCD: CPT | Performed by: ORTHOPAEDIC SURGERY

## 2024-12-16 PROCEDURE — 73502 X-RAY EXAM HIP UNI 2-3 VIEWS: CPT | Performed by: ORTHOPAEDIC SURGERY

## 2024-12-16 PROCEDURE — 99024 POSTOP FOLLOW-UP VISIT: CPT | Performed by: ORTHOPAEDIC SURGERY

## 2024-12-16 PROCEDURE — 1160F RVW MEDS BY RX/DR IN RCRD: CPT | Performed by: ORTHOPAEDIC SURGERY

## 2024-12-16 PROCEDURE — 1159F MED LIST DOCD IN RCRD: CPT | Performed by: ORTHOPAEDIC SURGERY

## 2024-12-16 RX ORDER — OXYCODONE HYDROCHLORIDE 5 MG/1
5 TABLET ORAL EVERY 4 HOURS PRN
Qty: 20 TABLET | Refills: 0 | Status: SHIPPED | OUTPATIENT
Start: 2024-12-16

## 2024-12-16 NOTE — PROGRESS NOTES
Clinic Note     Assessment/Plan:  79 year old female    Status post left hip hemiarthroplasty-patient is doing well.  Continue outpatient therapy.  She can drive if she would like to as long she not taking narcotic pain medication.  Continue posterior precautions.  Water aerobics is okay as is swimming.    Follow Up: 6 weeks, x-rays prior    Diagnostic Studies:     XR left hip/pelvis: Stable prosthesis without loosening or dislocation.     Physical Exam:     Ht 5' 6\" (1.676 m)   Wt 195 lb (88.5 kg)   BMI 31.47 kg/m²     Left Lower Extremity:     Inspection    Incision well-healed without signs of infection   Palpation    No significant tenderness palpation around the incision site      ROM    Hip Flexion: 90 degrees  Hip Extension: 10  Hip IR: 30  Hip ER: 30     Neurovascular    Normal sensation in sural, saphenous, deep peroneal, superficial peroneal, and tibial nerve distribution    Normally perfused foot        CC: Status post left hip hemiarthroplasty    HPI: This 79 year old female was admitted and now discharged for femoral neck fracture for which she underwent a left hip hemiarthroplasty on 11/27/2024.     Interval Hx (12/16/2024): Patient is doing well with improvement in pain.  She is able to navigate a small set of stairs.  She is been transition to outpatient therapy.  And is now back at her own house.    Occupation: Retired    History/Other:   Past Medical History:    Abnormal Pap smear of cervix    Acute blood loss anemia    Acute, but ill-defined, cerebrovascular disease    Tia    Allergic rhinitis    Arrhythmia    Tachycardia    Arthritis    left shoulder- had xray    Back problem    Occas. pain    Calculus of kidney    Cancer (HCC)    Basel cell    Cataract    Cervical spondylosis    KATIANA I (cervical intraepithelial neoplasia I)    Actually KATIANA 1-2, 2015, and KIRK 1    Complex endometrial hyperplasia with atypia    Disorder of thyroid    Esophageal reflux    Essential hypertension    Hemorrhoids     High blood pressure    High cholesterol    Hx of motion sickness    Hyperlipidemia    Hyperthyroidism    Hypothyroidism    Irregular bowel habits    Nail fungus    Onychomycosis Right foot, great toe     (normal spontaneous vaginal delivery) (HCC)    OAB (overactive bladder)    Obesity    Osteoarthritis    Left shoulder bursitis    Peripheral vascular disease (HCC)    PMB (postmenopausal bleeding)    Stroke, lacunar (HCC)    TIA & Lacunar Infarct - 2016 Mild plaque/no stenosis carotid arteries    Stroke, lacunar (HCC)    Visual impairment    glasses     Past Surgical History:   Procedure Laterality Date    Benign biopsy right      Cataract      Both eyes    Colonoscopy N/A 2021    Procedure: COLONOSCOPY;  Surgeon: Jose F Randolph MD;  Location:  [facility]    Colonoscopy      D & c      Dilation/curettage,diagnostic  2022    Abnormal vaginal bleeding    Hysterectomy      Leep      Needle biopsy right      bening 20 years ago          Oophorectomy Bilateral 2022    at time of hyst    Salpingectomy Bilateral 2022    at time of hyst     Total abdominal hysterectomy  2022    TOMASA, BSO - Dr. Millicent Edmonds      Current Outpatient Medications   Medication Sig Dispense Refill    oxyCODONE 5 MG Oral Tab Take 1 tablet (5 mg total) by mouth every 4 (four) hours as needed for Pain. 20 tablet 0    aspirin 81 MG Oral Tab EC Take 1 tablet (81 mg total) by mouth in the morning and 1 tablet (81 mg total) before bedtime. 60 tablet 0    oxyCODONE 5 MG Oral Tab Take 1 tablet (5 mg total) by mouth every 4 (four) hours as needed. 20 tablet 0    MAGNESIUM OR Take 1 tablet by mouth daily.      Multiple Vitamin (MULTI-VITAMIN) Oral Tab Take 1 tablet by mouth daily.      Cod Liver Oil (COD LIVER OR) Take 1 tablet by mouth daily.      CALCIUM OR Take 1 tablet by mouth daily.      Cholecalciferol (VITAMIN D-3 OR) Take 1 tablet by mouth daily.      lisinopril 5 MG Oral Tab Take 1 tablet  (5 mg total) by mouth daily. 90 tablet 1    metoprolol tartrate 25 MG Oral Tab TAKE 1/2 TABLET(12.5 MG) BY MOUTH TWICE DAILY 90 tablet 1    atorvastatin 40 MG Oral Tab Take 1 tablet (40 mg total) by mouth nightly. 90 tablet 0    levothyroxine 50 MCG Oral Tab Take 1 tablet (50 mcg total) by mouth before breakfast. 90 tablet 2    diclofenac 1 % External Gel Apply 2 g topically 2 (two) times daily as needed. 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   Tobacco Use    Smoking status: Former     Current packs/day: 0.00     Average packs/day: 0.3 packs/day for 10.0 years (2.5 ttl pk-yrs)     Types: Cigarettes     Start date: 1/10/1965     Quit date: 1/10/1975     Years since quittin.9     Passive exposure: Past    Smokeless tobacco: Never    Tobacco comments:     quit age 28   Vaping Use    Vaping status: Never Used   Substance and Sexual Activity    Alcohol use: Yes     Alcohol/week: 2.0 standard drinks of alcohol     Types: 1 Glasses of wine, 1 Cans of beer per week     Comment: Occasionally    Drug use: No    Sexual activity: Not Currently        Review of Systems (negative unless bolded):  General: fevers, chills, fatigue  CV:  chest pain, palpitations, leg swelling  Msk: bodyaches, neck pain, neck stiffness  Skin: rashes, open wounds, nonhealing ulcers  Hem: bleeds easily, bruise easily, immunocompromised  Neuro: dizziness, light  headedness, headaches  Psych: anxious, depressed, anger issues    Venkata Anna MD   Orthopedic Surgery  [email or web address]  t: [phone]  f: [phone]       [1]   Allergies  Allergen Reactions    Cholestatin ITCHING and Runny nose    Cyclobenzaprine RASH     Flushing and chest rash which she gets from various meds and other triggers    Seasonal Runny nose and ITCHING

## 2024-12-17 DIAGNOSIS — E78.5 DYSLIPIDEMIA: ICD-10-CM

## 2024-12-19 RX ORDER — ATORVASTATIN CALCIUM 40 MG/1
40 TABLET, FILM COATED ORAL NIGHTLY
Qty: 90 TABLET | Refills: 0 | Status: SHIPPED | OUTPATIENT
Start: 2024-12-19

## 2024-12-19 NOTE — TELEPHONE ENCOUNTER
Cholesterol Medication Protocol [id number]/17/2024 10:18 AM   Protocol Details ALT < 80    ALT resulted within past year    Lipid panel within past 12 months    In person appointment or virtual visit in the past 12 mos or appointment in next 3 mos   6. Dyslipidemia  -Continue statin  No future appointments.

## 2024-12-30 DIAGNOSIS — I10 ESSENTIAL HYPERTENSION: ICD-10-CM

## 2025-01-02 RX ORDER — METOPROLOL TARTRATE 25 MG/1
TABLET, FILM COATED ORAL
Qty: 90 TABLET | Refills: 0 | Status: SHIPPED | OUTPATIENT
Start: 2025-01-02

## 2025-01-02 RX ORDER — LISINOPRIL 5 MG/1
5 TABLET ORAL DAILY
Qty: 90 TABLET | Refills: 0 | Status: SHIPPED | OUTPATIENT
Start: 2025-01-02

## 2025-01-02 NOTE — TELEPHONE ENCOUNTER
Hypertension Medications Protocol Nqshfn1612/30/2024 09:30 AM   Protocol Details CMP or BMP in past 12 months    Last BP reading less than 140/90    In person appointment or virtual visit in the past 12 mos or appointment in next 3 mos    EGFRCR or GFRNAA > 50   . Essential hypertension  -BP stable, CPM   No future appointments.

## 2025-01-20 ENCOUNTER — TELEPHONE (OUTPATIENT)
Dept: INTERNAL MEDICINE CLINIC | Facility: CLINIC | Age: 80
End: 2025-01-20

## 2025-01-22 ENCOUNTER — HOSPITAL ENCOUNTER (OUTPATIENT)
Dept: MAMMOGRAPHY | Age: 80
Discharge: HOME | End: 2025-01-22
Attending: NURSE PRACTITIONER
Payer: MEDICARE

## 2025-01-22 DIAGNOSIS — Z12.31 ENCOUNTER FOR SCREENING MAMMOGRAM FOR BREAST CANCER: ICD-10-CM

## 2025-01-22 PROCEDURE — 77063 BREAST TOMOSYNTHESIS BI: CPT | Performed by: NURSE PRACTITIONER

## 2025-01-22 PROCEDURE — 77067 SCR MAMMO BI INCL CAD: CPT | Performed by: NURSE PRACTITIONER

## 2025-01-23 ENCOUNTER — MED REC SCAN ONLY (OUTPATIENT)
Dept: ORTHOPEDICS CLINIC | Facility: CLINIC | Age: 80
End: 2025-01-23

## 2025-01-31 ENCOUNTER — MED REC SCAN ONLY (OUTPATIENT)
Dept: INTERNAL MEDICINE CLINIC | Facility: CLINIC | Age: 80
End: 2025-01-31

## 2025-03-16 DIAGNOSIS — E78.5 DYSLIPIDEMIA: ICD-10-CM

## 2025-03-18 RX ORDER — ATORVASTATIN CALCIUM 40 MG/1
40 TABLET, FILM COATED ORAL NIGHTLY
Qty: 90 TABLET | Refills: 0 | Status: SHIPPED | OUTPATIENT
Start: 2025-03-18 | End: 2025-06-16

## 2025-03-18 NOTE — TELEPHONE ENCOUNTER
Cholesterol Medication Protocol [id number]/16/2025 11:17 AM   Protocol Details ALT < 80    ALT resulted within past year    Lipid panel within past 12 months    In person appointment or virtual visit in the past 12 mos or appointment in next 3 mos    Medication is active on med list      6. Dyslipidemia  -Continue statin  Future Appointments   Date Time Provider Department Center   3/19/2025  9:40 AM Nicole Acosta MD [id number]   5/9/2025  8:30 AM Johnny Aquino MD [facility]     Refill sent but pt due for annual in April, please call to schedule thanks!

## 2025-03-19 ENCOUNTER — OFFICE VISIT (OUTPATIENT)
Dept: ORTHOPEDICS CLINIC | Facility: CLINIC | Age: 80
End: 2025-03-19
Payer: MEDICARE

## 2025-03-19 VITALS — WEIGHT: 195 LBS | BODY MASS INDEX: 31.34 KG/M2 | HEIGHT: 66 IN

## 2025-03-19 DIAGNOSIS — M17.0 PRIMARY OSTEOARTHRITIS OF BOTH KNEES: Primary | ICD-10-CM

## 2025-03-19 PROCEDURE — 3008F BODY MASS INDEX DOCD: CPT | Performed by: ORTHOPAEDIC SURGERY

## 2025-03-19 PROCEDURE — 1159F MED LIST DOCD IN RCRD: CPT | Performed by: ORTHOPAEDIC SURGERY

## 2025-03-19 PROCEDURE — 20610 DRAIN/INJ JOINT/BURSA W/O US: CPT | Performed by: ORTHOPAEDIC SURGERY

## 2025-03-19 PROCEDURE — 99213 OFFICE O/P EST LOW 20 MIN: CPT | Performed by: ORTHOPAEDIC SURGERY

## 2025-03-19 PROCEDURE — 1160F RVW MEDS BY RX/DR IN RCRD: CPT | Performed by: ORTHOPAEDIC SURGERY

## 2025-03-19 RX ORDER — TRIAMCINOLONE ACETONIDE 40 MG/ML
40 INJECTION, SUSPENSION INTRA-ARTICULAR; INTRAMUSCULAR ONCE
Status: COMPLETED | OUTPATIENT
Start: 2025-03-19 | End: 2025-03-19

## 2025-03-19 RX ADMIN — TRIAMCINOLONE ACETONIDE 40 MG: 40 INJECTION, SUSPENSION INTRA-ARTICULAR; INTRAMUSCULAR at 10:21:00

## 2025-03-19 NOTE — PROGRESS NOTES
[facility] Orthopaedic Clinic Note    Chief Complaint   Patient presents with    Knee Pain     BILATERAL KNEE     HPI: The patient is a 80 year old female returning for assessment of her knees.  She has struggled with recurrent pain about the knees from osteoarthritis.  She relates favorable response to previous corticosteroid injections last administered about a year ago, prior to a trip to [place].  She unfortunately sustained a significant injury to her left hip requiring hemiarthroplasty performed by Dr. Valdez Anna around [facility] of last year.  She has recovered well but is now having recurrent pain about both knees.  She lives alone with her dog and is functioning reasonably well but is hopeful for symptomatic relief.      Past Medical History:    Abnormal Pap smear of cervix    Acute blood loss anemia    Acute, but ill-defined, cerebrovascular disease    Tia    Allergic rhinitis    Arrhythmia    Tachycardia    Arthritis    left shoulder- had xray    Back problem    Occas. pain    Calculus of kidney    Cancer (HCC)    Basel cell    Cataract    Cervical spondylosis    KATIANA I (cervical intraepithelial neoplasia I)    Actually KATIANA 1-2, , and KIRK 1    Complex endometrial hyperplasia with atypia    Disorder of thyroid    Esophageal reflux    Essential hypertension    Hemorrhoids    High blood pressure    High cholesterol    Hx of motion sickness    Hyperlipidemia    Hyperthyroidism    Hypothyroidism    Irregular bowel habits    Nail fungus    Onychomycosis Right foot, great toe     (normal spontaneous vaginal delivery) (HCC)    OAB (overactive bladder)    Obesity    Osteoarthritis    Left shoulder bursitis    Peripheral vascular disease    PMB (postmenopausal bleeding)    Stroke, lacunar (HCC)    TIA & Lacunar Infarct - 2016 Mild plaque/no stenosis carotid arteries    Stroke, lacunar (HCC)    Visual impairment    glasses     Past Surgical History:   Procedure Laterality Date    Benign biopsy right       Cataract  2005    Both eyes    Colonoscopy N/A 2021    Procedure: COLONOSCOPY;  Surgeon: Jose F Randolph MD;  Location:  ENDOSCOPY    Colonoscopy      D & c      Dilation/curettage,diagnostic  2022    Abnormal vaginal bleeding    Hysterectomy      Leep      Needle biopsy right      bening 20 years ago      1963/65    Oophorectomy Bilateral 2022    at time of hyst    Salpingectomy Bilateral 2022    at time of hyst     Total abdominal hysterectomy  2022    TOMASA, BSO - Dr. Millicent Edmonds      Current Outpatient Medications   Medication Sig Dispense Refill    ATORVASTATIN 40 MG Oral Tab TAKE 1 TABLET(40 MG) BY MOUTH EVERY NIGHT 90 tablet 0    metroNIDAZOLE 0.75 % External Cream Apply 1 Application topically 2 (two) times daily. 45 g 2    metoprolol tartrate 25 MG Oral Tab TAKE 1/2 TABLET(12.5 MG) BY MOUTH TWICE DAILY 90 tablet 0    lisinopril 5 MG Oral Tab TAKE 1 TABLET(5 MG) BY MOUTH DAILY 90 tablet 0    aspirin 81 MG Oral Tab EC Take 1 tablet (81 mg total) by mouth in the morning and 1 tablet (81 mg total) before bedtime. 60 tablet 0    MAGNESIUM OR Take 1 tablet by mouth daily.      Multiple Vitamin (MULTI-VITAMIN) Oral Tab Take 1 tablet by mouth daily.      Cod Liver Oil (COD LIVER OR) Take 1 tablet by mouth daily.      CALCIUM OR Take 1 tablet by mouth daily.      Cholecalciferol (VITAMIN D-3 OR) Take 1 tablet by mouth daily.      levothyroxine 50 MCG Oral Tab Take 1 tablet (50 mcg total) by mouth before breakfast. 90 tablet 2    diclofenac 1 % External Gel Apply 2 g topically 2 (two) times daily as needed. 50 g 0    oxyCODONE 5 MG Oral Tab Take 1 tablet (5 mg total) by mouth every 4 (four) hours as needed for Pain. (Patient not taking: Reported on 3/19/2025) 20 tablet 0     Allergies   Allergen Reactions    Cholestatin ITCHING and Runny nose    Cyclobenzaprine RASH     Flushing and chest rash which she gets from various meds and other triggers    Seasonal Runny nose and  ITCHING     Family History   Problem Relation Age of Onset    Other (CHF [Other]) Mother     Other (DM, [Other]) Mother     Other (arthritis [Other]) Mother     Heart Disorder Mother         Congestive heart failure    Diabetes Sister     Other (CABG [Other]) Brother     Other (CVA [Other]) Brother     Other (bladder cancer [Other]) Brother 79    Diabetes Son     Other (TB [Other]) Maternal Grandmother     Breast Cancer Niece 57    Other (MI, [Other]) Other     Other (HTN,hypercholesterolemia [Other]) Other      Social History     Occupational History    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   Tobacco Use    Smoking status: Former     Current packs/day: 0.00     Average packs/day: 0.3 packs/day for 10.0 years (2.5 ttl pk-yrs)     Types: Cigarettes     Start date: 1/10/1965     Quit date: 1/10/1975     Years since quittin.2     Passive exposure: Past    Smokeless tobacco: Never    Tobacco comments:     quit age 28   Vaping Use    Vaping status: Never Used   Substance and Sexual Activity    Alcohol use: Yes     Alcohol/week: 2.0 standard drinks of alcohol     Types: 1 Glasses of wine, 1 Cans of beer per week     Comment: Occasionally    Drug use: No    Sexual activity: Not Currently        ROS:  Complete ROS reviewed by me and non-contributory to the chief complaint except as mentioned above.    Physical Exam:    Ht 5' 6\" (1.676 m)   Wt 195 lb (88.5 kg)   BMI 31.47 kg/m²   Knees: Inspection reveals intact overlying skin without obvious discoloration but mild varus deformity.  Significant patellofemoral crepitus is palpable through adequate arc of motion including trace flexion contracture and 115 degrees of flexion bilaterally.  Mild patellofemoral and moderate tibiofemoral joint line tenderness is noted bilaterally.  No significant effusion is present bilaterally.  Ligaments are stable on gentle varus valgus stress with good endpoints.  Lachman and posterior drawer are both negative  bilaterally.  Neurovascular status is intact on sensory, motor and perfusion assessment distally.    Imaging:     XR KNEE (3 VIEWS), RIGHT (CPT=73562)     Result Date: 9/20/2023  CONCLUSION:  There is mild joint space narrowing in medial and patellofemoral compartments of knee joint.   LOCATION:  [facility]   Dictated by (CST): Wes Peoples MD on 9/20/2023 at 11:12 AM     Finalized by (CST): Wes Peoples MD on 9/20/2023 at 11:13 AM          XR KNEE (3 VIEWS), LEFT    Impression  CONCLUSION:  Moderate osteoarthritis predominantly within the medial compartment of the left knee.     LOCATION:  [facility]     Dictated by (CST): Bi Shah MD on 10/18/2023 at 3:34 PM      Finalized by (CST): Bi Shah MD on 10/18/2023 at 3:35 PM        Assessment/Diagnoses:  Diagnoses and all orders for this visit:    Primary osteoarthritis of both knees      Plan:  I reviewed imaging and exam findings with the patient.  She is struggling from recurrent pain due to degenerative joint disease of the knees.  We discussed the etiology, natural history and treatment options in detail.  Treatments include activity modification, weight loss, anti-inflammatory use and possible repeat injections.  She does report plans for travel once again although this time she will be flying [place].  For now, non-surgical treatment options are recommended.  We discussed the option for repeat knee corticosteroid injection along with the potential risks, benefits and alternatives.  In light of progressive symptoms, the patient elects to proceed and gives written consent.  All questions were answered and the patient verbalized understanding and appreciation.  If symptoms are not improving over the next 2 to 4 weeks, I asked the patient to contact our office for possible next steps.    Knee Cortisone Injection Procedure:  After discussing risks, benefits and alternatives to cortisone injection including but not limited to needle infection, steroid flare,  transient elevated blood sugars in diabetics or failed improvement, the patient gave written consent to proceed.  Using meticulous sterile technique, and after attempted aspiration, I injected 40 mg of Kenalog mixed with 4 cc of 1% Xylocaine at the lateral patellofemoral joint of the right and left knees in sequence to minimal resistance.  The patient tolerated this well and a Band-Aid was applied to each knee.  Instructions were given to contact us if the patient experiences any adverse effects.      Nicole Acosta MD, [facility]  Orthopaedic Surgery   Sports Medicine/Knee and [facility]  t: [phone]  f: [phone]             This document was partially prepared using Dragon Medical voice recognition software.  Although every attempt is made to correct errors during dictation, discrepancies may still exist.

## 2025-06-26 ENCOUNTER — LAB ENCOUNTER (OUTPATIENT)
Dept: LAB | Age: 80
End: 2025-06-26
Attending: NURSE PRACTITIONER
Payer: MEDICARE

## 2025-06-26 DIAGNOSIS — I10 ESSENTIAL HYPERTENSION: ICD-10-CM

## 2025-06-26 DIAGNOSIS — E03.9 ACQUIRED HYPOTHYROIDISM: ICD-10-CM

## 2025-06-26 DIAGNOSIS — E78.5 DYSLIPIDEMIA: ICD-10-CM

## 2025-06-26 DIAGNOSIS — D64.9 ANEMIA FOLLOWING SURGERY: ICD-10-CM

## 2025-06-26 DIAGNOSIS — E11.65 TYPE 2 DIABETES MELLITUS WITH HYPERGLYCEMIA, WITHOUT LONG-TERM CURRENT USE OF INSULIN (HCC): ICD-10-CM

## 2025-06-26 LAB
ALBUMIN SERPL-MCNC: 4.2 G/DL (ref 3.2–4.8)
ALBUMIN/GLOB SERPL: 2 {RATIO} (ref 1–2)
ALP LIVER SERPL-CCNC: 84 U/L (ref 55–142)
ALT SERPL-CCNC: 16 U/L (ref 10–49)
ANION GAP SERPL CALC-SCNC: 8 MMOL/L (ref 0–18)
AST SERPL-CCNC: 21 U/L (ref ?–34)
BASOPHILS # BLD AUTO: 0.04 X10(3) UL (ref 0–0.2)
BASOPHILS NFR BLD AUTO: 0.5 %
BILIRUB SERPL-MCNC: 0.5 MG/DL (ref 0.2–1.1)
BUN BLD-MCNC: 26 MG/DL (ref 9–23)
CALCIUM BLD-MCNC: 9 MG/DL (ref 8.7–10.6)
CHLORIDE SERPL-SCNC: 106 MMOL/L (ref 98–112)
CHOLEST SERPL-MCNC: 146 MG/DL (ref ?–200)
CO2 SERPL-SCNC: 29 MMOL/L (ref 21–32)
CREAT BLD-MCNC: 0.97 MG/DL (ref 0.55–1.02)
EGFRCR SERPLBLD CKD-EPI 2021: 58 ML/MIN/1.73M2 (ref 60–?)
EOSINOPHIL # BLD AUTO: 0.15 X10(3) UL (ref 0–0.7)
EOSINOPHIL NFR BLD AUTO: 1.7 %
ERYTHROCYTE [DISTWIDTH] IN BLOOD BY AUTOMATED COUNT: 12.4 %
EST. AVERAGE GLUCOSE BLD GHB EST-MCNC: 134 MG/DL (ref 68–126)
FASTING PATIENT LIPID ANSWER: YES
FASTING STATUS PATIENT QL REPORTED: YES
GLOBULIN PLAS-MCNC: 2.1 G/DL (ref 2–3.5)
GLUCOSE BLD-MCNC: 102 MG/DL (ref 70–99)
HBA1C MFR BLD: 6.3 % (ref ?–5.7)
HCT VFR BLD AUTO: 39.5 % (ref 35–48)
HDLC SERPL-MCNC: 54 MG/DL (ref 40–59)
HGB BLD-MCNC: 12.9 G/DL (ref 12–16)
IMM GRANULOCYTES # BLD AUTO: 0.01 X10(3) UL (ref 0–1)
IMM GRANULOCYTES NFR BLD: 0.1 %
LDLC SERPL CALC-MCNC: 78 MG/DL (ref ?–100)
LYMPHOCYTES # BLD AUTO: 3.98 X10(3) UL (ref 1–4)
LYMPHOCYTES NFR BLD AUTO: 45.4 %
MCH RBC QN AUTO: 30.1 PG (ref 26–34)
MCHC RBC AUTO-ENTMCNC: 32.7 G/DL (ref 31–37)
MCV RBC AUTO: 92.3 FL (ref 80–100)
MONOCYTES # BLD AUTO: 0.77 X10(3) UL (ref 0.1–1)
MONOCYTES NFR BLD AUTO: 8.8 %
NEUTROPHILS # BLD AUTO: 3.81 X10 (3) UL (ref 1.5–7.7)
NEUTROPHILS # BLD AUTO: 3.81 X10(3) UL (ref 1.5–7.7)
NEUTROPHILS NFR BLD AUTO: 43.5 %
NONHDLC SERPL-MCNC: 92 MG/DL (ref ?–130)
OSMOLALITY SERPL CALC.SUM OF ELEC: 301 MOSM/KG (ref 275–295)
PLATELET # BLD AUTO: 279 10(3)UL (ref 150–450)
POTASSIUM SERPL-SCNC: 4.2 MMOL/L (ref 3.5–5.1)
PROT SERPL-MCNC: 6.3 G/DL (ref 5.7–8.2)
RBC # BLD AUTO: 4.28 X10(6)UL (ref 3.8–5.3)
SODIUM SERPL-SCNC: 143 MMOL/L (ref 136–145)
TRIGL SERPL-MCNC: 68 MG/DL (ref 30–149)
TSI SER-ACNC: 2.6 UIU/ML (ref 0.55–4.78)
VLDLC SERPL CALC-MCNC: 11 MG/DL (ref 0–30)
WBC # BLD AUTO: 8.8 X10(3) UL (ref 4–11)

## 2025-06-26 PROCEDURE — 80061 LIPID PANEL: CPT

## 2025-06-26 PROCEDURE — 85025 COMPLETE CBC W/AUTO DIFF WBC: CPT

## 2025-06-26 PROCEDURE — 80053 COMPREHEN METABOLIC PANEL: CPT

## 2025-06-26 PROCEDURE — 36415 COLL VENOUS BLD VENIPUNCTURE: CPT

## 2025-06-26 PROCEDURE — 84443 ASSAY THYROID STIM HORMONE: CPT

## 2025-06-26 PROCEDURE — 83036 HEMOGLOBIN GLYCOSYLATED A1C: CPT

## 2025-07-01 NOTE — PROGRESS NOTES
Subjective:   Monica Nieto is a 80 year old female who presents for a MA AHA (Medicare Advantage Annual Health Assessment) and Subsequent Annual Wellness visit (Pt already had Initial Annual Wellness) and scheduled follow up of multiple significant but stable problems.          The following individual(s) verbally consented to be recorded using ambient AI listening technology and understand that they can each withdraw their consent to this listening technology at any point by asking the clinician to turn off or pause the recording:    Patient name: Monica Nieto  Additional names:  none     Mammo done 1/2025.   Colonoscopy done 2021, repeat in 10 years. Likely does not need these any further.   Dexa done 1/2024 showed osteopenia, frax 11% and 2.3%, error in dexa report that said 23%. She declines dexa.   No indication for pelvic exam. She has had a [facility] bso.      Up to date tdap, prevnar 20.   Due shingrix, rsv, covid booster. Get at her local pharmacy.      AHA flowsheet reviewed.   Fell in 11/2024.   Memory testing normal.   Mood has been stable. No depression.   Seeing eye doctor yearly.      Labs done, reviewed with pt.     History of Present Illness  She has a history of knee pain, describing her knees as 'a little wonky.' She underwent hip replacement surgery late last year following a fall. She received steroid injections in both knees, which provided some relief but did not completely alleviate the pain. She uses a cane for support when walking.    She is currently taking thyroid medication consistently, with no changes in dosage. Recent blood work showed normal thyroid levels. She also takes rosuvastatin or atorvastatin 40 mg and lisinopril 5 mg for kidney protection. Recent blood work showed a GFR of 58 and normal creatinine levels.     She experiences mild urinary leakage, which she manages by wearing a pad when she expects to be out for more than a couple of hours. Her past medical history includes a  total hysterectomy with removal of the ovaries, and she has not experienced any pelvic symptoms since the surgery.    She has a history of shingles and received the original shingles vaccine but has not had the newer version. She mentioned having symptoms consistent with a recent COVID-19 infection but did not test for it.    Her physical activity includes walking, swimming, and gardening, which she maintains despite her knee pain. She follows a balanced diet and has managed her blood sugar levels through diet alone, with an A1c of 6.3%.    History/Other:   Fall Risk Assessment:   She has been screened for Falls and is low risk.      Cognitive Assessment:   She had a completely normal cognitive assessment - see flowsheet entries       Functional Ability/Status:   Monica Nieto has some abnormal functions as listed below:  She has difficulties Affording Meds based on screening of functional status.       Depression Screening (PHQ):  PHQ-2 SCORE: 0  , done 7/2/2025          Advanced Directives:   She does NOT have a Living Will. [Do you have a living will?: No]  She does NOT have a Power of  for Health Care. [Do you have a healthcare power of ?: No]  Discussed with patient and provided information.        Problem List[1]  Allergies:  She is allergic to cholestatin, cyclobenzaprine, and seasonal.    Current Medications:  Active Meds, Sig Only[2]    Medical History:  She  has a past medical history of Abnormal Pap smear of cervix (11/24/2015), Acute blood loss anemia (3/18/2022), Acute, but ill-defined, cerebrovascular disease (11/2015), Allergic rhinitis (3 yrs), Arrhythmia, Arthritis, Back problem, Calculus of kidney (4/2016), Cancer (HCC) (2005), Cataract, Cervical spondylosis (10/31/2019), KATIANA I (cervical intraepithelial neoplasia I) (11/24/2015), Complex endometrial hyperplasia with atypia (3/18/2022), Disorder of thyroid, Esophageal reflux, Essential hypertension (11/24/2015), Hemorrhoids  (Internal), High blood pressure, High cholesterol (), motion sickness, Hyperlipidemia, Hyperthyroidism (09), Hypothyroidism (2013), Irregular bowel habits, Nail fungus (2009),  (normal spontaneous vaginal delivery) (HCC) (), OAB (overactive bladder) (2014), Obesity, Osteoarthritis (2010), Peripheral vascular disease, PMB (postmenopausal bleeding), Stroke, lacunar (HCC) (2015), Stroke, lacunar ([facility]) (2021), and Visual impairment.  Surgical History:  She  has a past surgical history that includes leep; cataract (); colonoscopy (N/A, 2021); benign biopsy right; dilation/curettage,diagnostic (2022); colonoscopy (); Total abdominal hysterectomy (2022); oophorectomy (Bilateral, 2022); salpingectomy (Bilateral, 2022); d & c; hysterectomy;  (); and needle biopsy right.   Family History:  Her family history includes Breast Cancer (age of onset: 57) in her niece; CABG in her brother; CHF in her mother; CVA in her brother; DM, in her mother; Diabetes in her sister and son; HTN,hypercholesterolemia in an other family member; Heart Disorder in her mother; MI, in an other family member; TB in her maternal grandmother; arthritis in her mother; bladder cancer (age of onset: 79) in her brother.  Social History:  She  reports that she quit smoking about 50 years ago. Her smoking use included cigarettes. She started smoking about 60 years ago. She has a 2.5 pack-year smoking history. She has been exposed to tobacco smoke. She has never used smokeless tobacco. She reports current alcohol use of about 2.0 standard drinks of alcohol per week. She reports that she does not use drugs.    Tobacco:  She smoked tobacco in the past but quit greater than 12 months ago.  Tobacco Use[3]     CAGE Alcohol Screen:   CAGE screening score of 0 on 2025, showing low risk of alcohol abuse.      Patient Care Team:  Rosette Ivy MD as PCP - General Ni  Aneta (OPHTHALMOLOGY)  Teo Mccoy MD as Consulting Physician (Cardiac Electrophysiology)  Attila Contreras MD (OBSTETRICS & GYNECOLOGY)    Review of Systems  GENERAL: feels well otherwise  SKIN: denies any unusual skin lesions  EYES:denies blurred vision or double vision  HEENT: denies nasal congestion, sinus pain or ST  LUNGS: denies shortness of breath with exertion  CARDIOVASCULAR: denies chest pain on exertion  GI: denies abdominal pain,denies heartburn  : denies dysuria, vaginal discharge or itching  MUSCULOSKELETAL: denies back pain  NEURO: denies headaches  PSYCHE: denies depression or anxiety  HEMATOLOGIC: denies hx of anemia  ENDOCRINE: hypothyroid  ALL/ASTHMA: denies hx of allergy or asthma     Objective:   Physical Exam  General Appearance:  Alert, cooperative, no distress, appears stated age   Head:  Normocephalic, without obvious abnormality, atraumatic   Eyes:  PERRL, conjunctiva/corneas clear, EOM's intact both eyes   Ears:  Normal TM's and external ear canals, both ears   Nose: Nares normal, septum midline,mucosa normal, no drainage or sinus tenderness   Throat: Lips, mucosa, and tongue normal; teeth and gums normal   Neck: Supple, symmetrical, trachea midline, no adenopathy;  thyroid: not enlarged, no carotid bruit or JVD   Back:   Symmetric, no curvature, ROM normal, no CVA tenderness   Lungs:   Clear to auscultation bilaterally, respirations unlabored   Heart:  Regular rate and rhythm, S1 and S2 normal, no murmur, rub, or gallop   Abdomen:   Soft, non-tender, bowel sounds active all four quadrants,  no masses, no organomegaly   Pelvic: Deferred   Extremities: Extremities normal, atraumatic, no cyanosis or edema   Pulses: 2+ and symmetric   Skin: Skin color, texture, turgor normal, no rashes or lesions   Lymph nodes: Cervical, supraclavicular, and axillary nodes normal   Neurologic: Normal   Bilateral barefoot skin diabetic exam is normal, visualized feet and the appearance is  normal.  Bilateral monofilament/sensation of both feet is normal.  Pulsation pedal pulse exam of both lower legs/feet is normal as well.       /70 (BP Location: Right arm, Patient Position: Sitting, Cuff Size: large)   Pulse 66   Temp 97.6 °F (36.4 °C) (Temporal)   Resp 20   Ht 5' 5.35\" (1.66 m)   Wt 196 lb (88.9 kg)   SpO2 99%   BMI 32.26 kg/m²  Estimated body mass index is 32.26 kg/m² as calculated from the following:    Height as of this encounter: 5' 5.35\" (1.66 m).    Weight as of this encounter: 196 lb (88.9 kg).    Medicare Hearing Assessment:   Hearing Screening    Time taken: 7/2/2025  1:15 PM  Screening Method: Finger Rub  Finger Rub Result: Pass               Assessment & Plan:   Monica Nieto is a 80 year old female who presents for a Medicare Assessment.     1. Encounter for annual health examination  Mammo done 1/2025.   Colonoscopy done 2021, repeat in 10 years. Likely does not need these any further.   Dexa done 1/2024 showed osteopenia, frax 11% and 2.3%, error in dexa report that said 23%. She declines dexa.   No indication for pelvic exam. She has had a [facility] bso.      Up to date tdap, prevnar 20.   Due shingrix, rsv, covid booster. Get at her local pharmacy.      [facility] flowsheet reviewed.   Fell in 11/2024.   Memory testing normal.   Mood has been stable. No depression.   Seeing eye doctor yearly.      2. Essential hypertension  Continue meds.     3. Dyslipidemia  Continue statin.   - CMP in 6 months; Future  - Lipid in 6 months; Future    4. Type 2 diabetes mellitus with other specified complication, without long-term current use of insulin (HCC)  Diet controlled.   She would like to try metformin to see if it can help with weight loss.   Take metformin er 500mg bid, if tolerates can go up to 1000mg bid. Let me know so I can adjust the refills.   - metFORMIN  MG Oral Tablet 24 Hr; Take 1 tablet (500 mg total) by mouth 2 (two) times daily with meals.  Dispense: 180 tablet; Refill:  0  - Hemoglobin A1C in 6 months; Future    5. Acquired hypothyroidism  Continue levothyroxine 50mcg daily.  She has been taking this regularly.   - TSH [E]; Future    6. Bilateral carotid artery disease, unspecified type  Continue statin     7. SVT (supraventricular tachycardia) (HCC)  Continue meds.     8. CKD stage 3 due to type 2 diabetes mellitus (HCC)  Stable. Continue current management.      9. Stage 3a chronic kidney disease (HCC)  Stable. Continue current management.      10. Cervical spondylosis  Stable. Continue current management.      11. History of stroke  No new symptoms.     12. Lumbar spondylosis  Stable. Continue current management.      13. Encounter for screening mammogram for malignant neoplasm of breast  - [facility] LARRY 2D+3D SCREENING BILAT (CPT=77067/63862); Future      Assessment & Plan  Knee Osteoarthritis  Chronic knee pain managed with steroid injections. Pain persists but is manageable with a cane. No current plans for knee replacement due to weight concerns and her preference.  - Continue steroid injections as needed.  - Encourage weight management to potentially delay knee replacement.    Obesity  Weight is higher than desired. Limited exercise due to knee pain. Discussed potential use of metformin for weight loss and appetite suppression. Informed of potential gastrointestinal side effects such as diarrhea and acid reflux. Plan to start at a low dose and increase as tolerated.  - Start metformin 500 mg twice daily, increase to 1000 mg twice daily as tolerated.  - Monitor for gastrointestinal side effects.    Chronic Kidney Disease Stage 3  CKD stage 3 based on GFR of 58. Creatinine levels are normal. No immediate interventions required other than maintaining hydration.  - Encourage adequate hydration.    Hypertension  Blood pressure managed with lisinopril 5 mg for kidney protection.  - Continue lisinopril 5 mg.  Also on metoprolol.     Type 2 Diabetes Mellitus  Diet-controlled diabetes  with an A1c of 6.3. No medication required.  - Continue current diet and exercise regimen.    Hyperlipidemia  Cholesterol levels well-controlled with atorvastatin 40 mg.  - Continue atorvastatin 40 mg.    Hypothyroidism  Thyroid function well-controlled with current medication regimen.  - Refill thyroid medication as needed.    Urinary Incontinence  Intermittent urinary leakage managed with pads. Pelvic floor physical therapy offered but she prefers current management.  - Consider pelvic floor physical therapy if symptoms worsen.    General Health Maintenance  Routine screenings and vaccinations discussed. Mammogram and bone density scan planned. Shingles and RSV vaccinations recommended. COVID booster discussed but left to her discretion.  - Order mammogram for January next year.  - Discuss and consider shingles and RSV vaccinations.  - COVID booster optional.    Follow-up  Routine follow-up and monitoring planned to ensure continued management of chronic conditions.  - Follow up in 6 months for medication check and blood work.  - Order blood work for cholesterol, liver, kidneys, A1c, and thyroid before next visit.    Recording duration: 17 minutes  The patient indicates understanding of these issues and agrees to the plan.  Reinforced healthy diet, lifestyle, and exercise.      Return in about 6 months (around 1/2/2026) for med check.     Rosette Ivy MD, 6/30/2025     Supplementary Documentation:   General Health:  In the past six months, have you lost more than 10 pounds without trying?: 2 - No  Has your appetite been poor?: No  Type of Diet: Balanced  How does the patient maintain a good energy level?: Appropriate Exercise, Daily Walks  How would you describe your daily physical activity?: Moderate  How would you describe your current health state?: Good  How do you maintain positive mental well-being?: Social Interaction, Visiting Friends, Visiting Family  On a scale of 0 to 10, with 0 being no pain and 10  being severe pain, what is your pain level?: 1 - (Mild)  In the past six months, have you experienced urine leakage?: 1-Yes  At any time do you feel concerned for the safety/well-being of yourself and/or your children, in your home or elsewhere?: No  Have you had any immunizations at another office such as Influenza, Hepatitis B, Tetanus, or Pneumococcal?: No    Health Maintenance   Topic Date Due    Zoster Vaccines (2 of 3) 10/22/2009    Annual Well Visit  01/01/2025    Annual Depression Screening  01/01/2025    Fall Risk Screening (Annual)  01/01/2025    Diabetes Care: Microalb/Creat Ratio (Annual)  01/01/2025    Diabetes Care Foot Exam  04/16/2025    COVID-19 Vaccine (7 - 2024-25 season) 05/21/2025    Diabetes Care A1C  12/26/2025    Diabetes Care Dilated Eye Exam  01/17/2026    Mammogram  01/22/2026    Diabetes Care: GFR  06/26/2026    Colorectal Cancer Screening  09/07/2031    Influenza Vaccine  Completed    Pneumococcal Vaccine: 50+ Years  Completed    Meningococcal B Vaccine  Aged Out            [1]   Patient Active Problem List  Diagnosis    Hypothyroidism    Dyslipidemia    Essential hypertension    Carotid disease, bilateral    SVT (supraventricular tachycardia) (HCC)    History of stroke    Cervical spondylosis    Lumbar spondylosis    Type 2 diabetes mellitus with other specified complication (HCC)    CKD (chronic kidney disease) stage 3, GFR 30-59 ml/min (HCC)    CKD stage 3 due to type 2 diabetes mellitus (HCC)   [2]   Outpatient Medications Marked as Taking for the 7/2/25 encounter (Office Visit) with Rosette Ivy MD   Medication Sig    metFORMIN  MG Oral Tablet 24 Hr Take 1 tablet (500 mg total) by mouth 2 (two) times daily with meals.    LEVOTHYROXINE 50 MCG Oral Tab TAKE 1 TABLET(50 MCG) BY MOUTH BEFORE BREAKFAST    ATORVASTATIN 40 MG Oral Tab TAKE 1 TABLET(40 MG) BY MOUTH EVERY NIGHT    Azelaic Acid (FINACEA) 15 % External Gel Apply 1 Application topically 2 (two) times daily.    metoprolol  tartrate 25 MG Oral Tab TAKE 1/2 TABLET(12.5 MG) BY MOUTH TWICE DAILY    lisinopril 5 MG Oral Tab TAKE 1 TABLET(5 MG) BY MOUTH DAILY    oxyCODONE 5 MG Oral Tab Take 1 tablet (5 mg total) by mouth every 4 (four) hours as needed for Pain.    aspirin 81 MG Oral Tab EC Take 1 tablet (81 mg total) by mouth in the morning and 1 tablet (81 mg total) before bedtime.    MAGNESIUM OR Take 1 tablet by mouth daily.    Multiple Vitamin (MULTI-VITAMIN) Oral Tab Take 1 tablet by mouth daily.    Cod Liver Oil (COD LIVER OR) Take 1 tablet by mouth daily.    CALCIUM OR Take 1 tablet by mouth daily.    Cholecalciferol (VITAMIN D-3 OR) Take 1 tablet by mouth daily.    diclofenac 1 % External Gel Apply 2 g topically 2 (two) times daily as needed.   [3]   Social History  Tobacco Use   Smoking Status Former    Current packs/day: 0.00    Average packs/day: 0.3 packs/day for 10.0 years (2.5 ttl pk-yrs)    Types: Cigarettes    Start date: 1/10/1965    Quit date: 1/10/1975    Years since quittin.5    Passive exposure: Past   Smokeless Tobacco Never   Tobacco Comments    quit age 28

## 2025-07-02 ENCOUNTER — OFFICE VISIT (OUTPATIENT)
Dept: INTERNAL MEDICINE CLINIC | Facility: CLINIC | Age: 80
End: 2025-07-02
Payer: MEDICARE

## 2025-07-02 VITALS
RESPIRATION RATE: 20 BRPM | HEART RATE: 66 BPM | SYSTOLIC BLOOD PRESSURE: 122 MMHG | HEIGHT: 65.35 IN | DIASTOLIC BLOOD PRESSURE: 70 MMHG | OXYGEN SATURATION: 99 % | TEMPERATURE: 98 F | BODY MASS INDEX: 32.26 KG/M2 | WEIGHT: 196 LBS

## 2025-07-02 DIAGNOSIS — M47.812 CERVICAL SPONDYLOSIS: ICD-10-CM

## 2025-07-02 DIAGNOSIS — Z12.31 ENCOUNTER FOR SCREENING MAMMOGRAM FOR MALIGNANT NEOPLASM OF BREAST: ICD-10-CM

## 2025-07-02 DIAGNOSIS — E11.22 CKD STAGE 3 DUE TO TYPE 2 DIABETES MELLITUS (HCC): Chronic | ICD-10-CM

## 2025-07-02 DIAGNOSIS — N18.31 STAGE 3A CHRONIC KIDNEY DISEASE (HCC): Chronic | ICD-10-CM

## 2025-07-02 DIAGNOSIS — Z00.00 ENCOUNTER FOR ANNUAL HEALTH EXAMINATION: Primary | ICD-10-CM

## 2025-07-02 DIAGNOSIS — E03.9 ACQUIRED HYPOTHYROIDISM: ICD-10-CM

## 2025-07-02 DIAGNOSIS — Z86.73 HISTORY OF STROKE: ICD-10-CM

## 2025-07-02 DIAGNOSIS — I10 ESSENTIAL HYPERTENSION: ICD-10-CM

## 2025-07-02 DIAGNOSIS — M47.816 LUMBAR SPONDYLOSIS: ICD-10-CM

## 2025-07-02 DIAGNOSIS — I47.10 SVT (SUPRAVENTRICULAR TACHYCARDIA) (HCC): ICD-10-CM

## 2025-07-02 DIAGNOSIS — E78.5 DYSLIPIDEMIA: ICD-10-CM

## 2025-07-02 DIAGNOSIS — E11.69 TYPE 2 DIABETES MELLITUS WITH OTHER SPECIFIED COMPLICATION, WITHOUT LONG-TERM CURRENT USE OF INSULIN (HCC): ICD-10-CM

## 2025-07-02 DIAGNOSIS — N18.30 CKD STAGE 3 DUE TO TYPE 2 DIABETES MELLITUS (HCC): Chronic | ICD-10-CM

## 2025-07-02 DIAGNOSIS — I77.9 BILATERAL CAROTID ARTERY DISEASE, UNSPECIFIED TYPE: ICD-10-CM

## 2025-07-02 PROBLEM — S72.009A HIP FRACTURE (HCC): Status: RESOLVED | Noted: 2024-11-26 | Resolved: 2025-07-02

## 2025-07-02 PROBLEM — S72.002A LEFT DISPLACED FEMORAL NECK FRACTURE (HCC): Status: RESOLVED | Noted: 2024-11-26 | Resolved: 2025-07-02

## 2025-07-02 PROCEDURE — 96160 PT-FOCUSED HLTH RISK ASSMT: CPT | Performed by: INTERNAL MEDICINE

## 2025-07-02 PROCEDURE — 3008F BODY MASS INDEX DOCD: CPT | Performed by: INTERNAL MEDICINE

## 2025-07-02 PROCEDURE — 1170F FXNL STATUS ASSESSED: CPT | Performed by: INTERNAL MEDICINE

## 2025-07-02 PROCEDURE — G2211 COMPLEX E/M VISIT ADD ON: HCPCS | Performed by: INTERNAL MEDICINE

## 2025-07-02 PROCEDURE — 3078F DIAST BP <80 MM HG: CPT | Performed by: INTERNAL MEDICINE

## 2025-07-02 PROCEDURE — G0439 PPPS, SUBSEQ VISIT: HCPCS | Performed by: INTERNAL MEDICINE

## 2025-07-02 PROCEDURE — 99214 OFFICE O/P EST MOD 30 MIN: CPT | Performed by: INTERNAL MEDICINE

## 2025-07-02 PROCEDURE — 1125F AMNT PAIN NOTED PAIN PRSNT: CPT | Performed by: INTERNAL MEDICINE

## 2025-07-02 PROCEDURE — 1159F MED LIST DOCD IN RCRD: CPT | Performed by: INTERNAL MEDICINE

## 2025-07-02 PROCEDURE — 3074F SYST BP LT 130 MM HG: CPT | Performed by: INTERNAL MEDICINE

## 2025-07-02 RX ORDER — METFORMIN HYDROCHLORIDE 500 MG/1
500 TABLET, EXTENDED RELEASE ORAL 2 TIMES DAILY WITH MEALS
Qty: 180 TABLET | Refills: 0 | Status: SHIPPED | OUTPATIENT
Start: 2025-07-02

## 2025-08-05 ENCOUNTER — HOSPITAL ENCOUNTER (INPATIENT)
Facility: HOSPITAL | Age: 80
LOS: 3 days | Discharge: HOME | End: 2025-08-08
Attending: STUDENT IN AN ORGANIZED HEALTH CARE EDUCATION/TRAINING PROGRAM | Admitting: HOSPITALIST

## 2025-08-05 ENCOUNTER — APPOINTMENT (OUTPATIENT)
Dept: GENERAL RADIOLOGY | Facility: HOSPITAL | Age: 80
End: 2025-08-05
Attending: STUDENT IN AN ORGANIZED HEALTH CARE EDUCATION/TRAINING PROGRAM

## 2025-08-05 ENCOUNTER — APPOINTMENT (OUTPATIENT)
Dept: CV DIAGNOSTICS | Facility: HOSPITAL | Age: 80
End: 2025-08-05

## 2025-08-05 DIAGNOSIS — I47.10 SVT (SUPRAVENTRICULAR TACHYCARDIA) (HCC): Primary | ICD-10-CM

## 2025-08-05 DIAGNOSIS — J18.9 MULTIFOCAL PNEUMONIA: ICD-10-CM

## 2025-08-05 LAB
ALBUMIN SERPL-MCNC: 3.8 G/DL (ref 3.2–4.8)
ALBUMIN/GLOB SERPL: 1.5 (ref 1–2)
ALP LIVER SERPL-CCNC: 97 U/L (ref 55–142)
ALT SERPL-CCNC: 29 U/L (ref 10–49)
ANION GAP SERPL CALC-SCNC: 8 MMOL/L (ref 0–18)
AST SERPL-CCNC: 19 U/L (ref ?–34)
ATRIAL RATE: 97 BPM
BASOPHILS # BLD AUTO: 0.04 X10(3) UL (ref 0–0.2)
BASOPHILS NFR BLD AUTO: 0.3 %
BILIRUB SERPL-MCNC: 0.5 MG/DL (ref 0.2–1.1)
BUN BLD-MCNC: 16 MG/DL (ref 9–23)
CALCIUM BLD-MCNC: 8.6 MG/DL (ref 8.7–10.6)
CHLORIDE SERPL-SCNC: 104 MMOL/L (ref 98–112)
CO2 SERPL-SCNC: 29 MMOL/L (ref 21–32)
CREAT BLD-MCNC: 0.99 MG/DL (ref 0.55–1.02)
EGFRCR SERPLBLD CKD-EPI 2021: 57 ML/MIN/1.73M2 (ref 60–?)
EOSINOPHIL # BLD AUTO: 0.19 X10(3) UL (ref 0–0.7)
EOSINOPHIL NFR BLD AUTO: 1.4 %
ERYTHROCYTE [DISTWIDTH] IN BLOOD BY AUTOMATED COUNT: 11.8 %
FLUAV + FLUBV RNA SPEC NAA+PROBE: NEGATIVE
FLUAV + FLUBV RNA SPEC NAA+PROBE: NEGATIVE
GLOBULIN PLAS-MCNC: 2.5 G/DL (ref 2–3.5)
GLUCOSE BLD-MCNC: 128 MG/DL (ref 70–99)
GLUCOSE BLD-MCNC: 161 MG/DL (ref 70–99)
GLUCOSE BLD-MCNC: 93 MG/DL (ref 70–99)
GLUCOSE BLD-MCNC: 95 MG/DL (ref 70–99)
HCT VFR BLD AUTO: 38.1 % (ref 35–48)
HGB BLD-MCNC: 12.6 G/DL (ref 12–16)
IMM GRANULOCYTES # BLD AUTO: 0.04 X10(3) UL (ref 0–1)
IMM GRANULOCYTES NFR BLD: 0.3 %
LACTATE SERPL-SCNC: 0.8 MMOL/L (ref 0.5–2)
LYMPHOCYTES # BLD AUTO: 3.12 X10(3) UL (ref 1–4)
LYMPHOCYTES NFR BLD AUTO: 22.6 %
MAGNESIUM SERPL-MCNC: 1.8 MG/DL (ref 1.6–2.6)
MCH RBC QN AUTO: 30 PG (ref 26–34)
MCHC RBC AUTO-ENTMCNC: 33.1 G/DL (ref 31–37)
MCV RBC AUTO: 90.7 FL (ref 80–100)
MONOCYTES # BLD AUTO: 1.7 X10(3) UL (ref 0.1–1)
MONOCYTES NFR BLD AUTO: 12.3 %
NEUTROPHILS # BLD AUTO: 8.73 X10 (3) UL (ref 1.5–7.7)
NEUTROPHILS # BLD AUTO: 8.73 X10(3) UL (ref 1.5–7.7)
NEUTROPHILS NFR BLD AUTO: 63.1 %
OSMOLALITY SERPL CALC.SUM OF ELEC: 297 MOSM/KG (ref 275–295)
P AXIS: 63 DEGREES
P-R INTERVAL: 154 MS
PLATELET # BLD AUTO: 401 10(3)UL (ref 150–450)
POTASSIUM SERPL-SCNC: 4.2 MMOL/L (ref 3.5–5.1)
PROT SERPL-MCNC: 6.3 G/DL (ref 5.7–8.2)
Q-T INTERVAL: 326 MS
QRS DURATION: 86 MS
QTC CALCULATION (BEZET): 414 MS
R AXIS: -50 DEGREES
RBC # BLD AUTO: 4.2 X10(6)UL (ref 3.8–5.3)
RSV RNA SPEC NAA+PROBE: NEGATIVE
SARS-COV-2 RNA RESP QL NAA+PROBE: NOT DETECTED
SODIUM SERPL-SCNC: 141 MMOL/L (ref 136–145)
T AXIS: 34 DEGREES
TROPONIN I SERPL HS-MCNC: 14 NG/L (ref ?–34)
TROPONIN I SERPL HS-MCNC: 14 NG/L (ref ?–34)
TSI SER-ACNC: 4.65 UIU/ML (ref 0.55–4.78)
VENTRICULAR RATE: 97 BPM
WBC # BLD AUTO: 13.8 X10(3) UL (ref 4–11)

## 2025-08-05 PROCEDURE — 71045 X-RAY EXAM CHEST 1 VIEW: CPT | Performed by: STUDENT IN AN ORGANIZED HEALTH CARE EDUCATION/TRAINING PROGRAM

## 2025-08-05 PROCEDURE — 93306 TTE W/DOPPLER COMPLETE: CPT

## 2025-08-05 PROCEDURE — 99223 1ST HOSP IP/OBS HIGH 75: CPT | Performed by: HOSPITALIST

## 2025-08-05 RX ORDER — METOPROLOL TARTRATE 25 MG/1
25 TABLET, FILM COATED ORAL
Status: DISCONTINUED | OUTPATIENT
Start: 2025-08-05 | End: 2025-08-05

## 2025-08-05 RX ORDER — SENNOSIDES 8.6 MG
17.2 TABLET ORAL NIGHTLY PRN
Status: DISCONTINUED | OUTPATIENT
Start: 2025-08-05 | End: 2025-08-08

## 2025-08-05 RX ORDER — SODIUM CHLORIDE 9 MG/ML
1000 INJECTION, SOLUTION INTRAVENOUS ONCE
Status: COMPLETED | OUTPATIENT
Start: 2025-08-05 | End: 2025-08-05

## 2025-08-05 RX ORDER — AZITHROMYCIN 250 MG/1
500 TABLET, FILM COATED ORAL ONCE
Status: COMPLETED | OUTPATIENT
Start: 2025-08-05 | End: 2025-08-05

## 2025-08-05 RX ORDER — LEVOTHYROXINE SODIUM 50 UG/1
50 TABLET ORAL
Status: DISCONTINUED | OUTPATIENT
Start: 2025-08-05 | End: 2025-08-08

## 2025-08-05 RX ORDER — NICOTINE POLACRILEX 4 MG
15 LOZENGE BUCCAL
Status: DISCONTINUED | OUTPATIENT
Start: 2025-08-05 | End: 2025-08-08

## 2025-08-05 RX ORDER — AZITHROMYCIN 250 MG/1
500 TABLET, FILM COATED ORAL DAILY
Status: COMPLETED | OUTPATIENT
Start: 2025-08-06 | End: 2025-08-07

## 2025-08-05 RX ORDER — ASPIRIN 81 MG/1
81 TABLET ORAL DAILY
COMMUNITY
End: 2025-08-08

## 2025-08-05 RX ORDER — POLYETHYLENE GLYCOL 3350 17 G/17G
17 POWDER, FOR SOLUTION ORAL DAILY PRN
Status: DISCONTINUED | OUTPATIENT
Start: 2025-08-05 | End: 2025-08-08

## 2025-08-05 RX ORDER — HEPARIN SODIUM 5000 [USP'U]/ML
5000 INJECTION, SOLUTION INTRAVENOUS; SUBCUTANEOUS EVERY 8 HOURS SCHEDULED
Status: DISCONTINUED | OUTPATIENT
Start: 2025-08-05 | End: 2025-08-08

## 2025-08-05 RX ORDER — ATORVASTATIN CALCIUM 40 MG/1
40 TABLET, FILM COATED ORAL NIGHTLY
Status: DISCONTINUED | OUTPATIENT
Start: 2025-08-05 | End: 2025-08-08

## 2025-08-05 RX ORDER — SODIUM CHLORIDE 9 MG/ML
500 INJECTION, SOLUTION INTRAVENOUS ONCE
Status: COMPLETED | OUTPATIENT
Start: 2025-08-05 | End: 2025-08-05

## 2025-08-05 RX ORDER — ACETAMINOPHEN 500 MG
500 TABLET ORAL EVERY 4 HOURS PRN
Status: DISCONTINUED | OUTPATIENT
Start: 2025-08-05 | End: 2025-08-08

## 2025-08-05 RX ORDER — METOPROLOL SUCCINATE 50 MG/1
50 TABLET, EXTENDED RELEASE ORAL
Status: DISCONTINUED | OUTPATIENT
Start: 2025-08-05 | End: 2025-08-06

## 2025-08-05 RX ORDER — ADENOSINE 3 MG/ML
6 INJECTION, SOLUTION INTRAVENOUS ONCE
Status: COMPLETED | OUTPATIENT
Start: 2025-08-05 | End: 2025-08-05

## 2025-08-05 RX ORDER — LISINOPRIL 5 MG/1
5 TABLET ORAL DAILY
Status: DISCONTINUED | OUTPATIENT
Start: 2025-08-05 | End: 2025-08-08

## 2025-08-05 RX ORDER — BENZONATATE 200 MG/1
200 CAPSULE ORAL 3 TIMES DAILY PRN
Status: DISCONTINUED | OUTPATIENT
Start: 2025-08-05 | End: 2025-08-08

## 2025-08-05 RX ORDER — DEXTROSE MONOHYDRATE 25 G/50ML
50 INJECTION, SOLUTION INTRAVENOUS
Status: DISCONTINUED | OUTPATIENT
Start: 2025-08-05 | End: 2025-08-08

## 2025-08-05 RX ORDER — SODIUM CHLORIDE 9 MG/ML
INJECTION, SOLUTION INTRAVENOUS CONTINUOUS
Status: DISCONTINUED | OUTPATIENT
Start: 2025-08-05 | End: 2025-08-08

## 2025-08-05 RX ORDER — ASPIRIN 81 MG/1
81 TABLET ORAL DAILY
Status: DISCONTINUED | OUTPATIENT
Start: 2025-08-06 | End: 2025-08-08

## 2025-08-05 RX ORDER — METOCLOPRAMIDE HYDROCHLORIDE 5 MG/ML
5 INJECTION INTRAMUSCULAR; INTRAVENOUS EVERY 8 HOURS PRN
Status: DISCONTINUED | OUTPATIENT
Start: 2025-08-05 | End: 2025-08-08

## 2025-08-05 RX ORDER — BISACODYL 10 MG
10 SUPPOSITORY, RECTAL RECTAL
Status: DISCONTINUED | OUTPATIENT
Start: 2025-08-05 | End: 2025-08-08

## 2025-08-05 RX ORDER — NICOTINE POLACRILEX 4 MG
30 LOZENGE BUCCAL
Status: DISCONTINUED | OUTPATIENT
Start: 2025-08-05 | End: 2025-08-08

## 2025-08-05 RX ORDER — ONDANSETRON 2 MG/ML
4 INJECTION INTRAMUSCULAR; INTRAVENOUS EVERY 6 HOURS PRN
Status: DISCONTINUED | OUTPATIENT
Start: 2025-08-05 | End: 2025-08-08

## 2025-08-05 RX ORDER — SODIUM PHOSPHATE, DIBASIC AND SODIUM PHOSPHATE, MONOBASIC 7; 19 G/230ML; G/230ML
1 ENEMA RECTAL ONCE AS NEEDED
Status: DISCONTINUED | OUTPATIENT
Start: 2025-08-05 | End: 2025-08-08

## 2025-08-05 RX ORDER — MAGNESIUM OXIDE 400 MG/1
400 TABLET ORAL ONCE
Status: COMPLETED | OUTPATIENT
Start: 2025-08-05 | End: 2025-08-05

## 2025-08-06 LAB
ANION GAP SERPL CALC-SCNC: 8 MMOL/L (ref 0–18)
BASOPHILS # BLD AUTO: 0.02 X10(3) UL (ref 0–0.2)
BASOPHILS NFR BLD AUTO: 0.2 %
BUN BLD-MCNC: 12 MG/DL (ref 9–23)
CALCIUM BLD-MCNC: 8.1 MG/DL (ref 8.7–10.6)
CHLORIDE SERPL-SCNC: 110 MMOL/L (ref 98–112)
CO2 SERPL-SCNC: 27 MMOL/L (ref 21–32)
CREAT BLD-MCNC: 0.86 MG/DL (ref 0.55–1.02)
EGFRCR SERPLBLD CKD-EPI 2021: 67 ML/MIN/1.73M2 (ref 60–?)
EOSINOPHIL # BLD AUTO: 0.25 X10(3) UL (ref 0–0.7)
EOSINOPHIL NFR BLD AUTO: 2.6 %
ERYTHROCYTE [DISTWIDTH] IN BLOOD BY AUTOMATED COUNT: 11.7 %
GLUCOSE BLD-MCNC: 113 MG/DL (ref 70–99)
GLUCOSE BLD-MCNC: 116 MG/DL (ref 70–99)
GLUCOSE BLD-MCNC: 121 MG/DL (ref 70–99)
GLUCOSE BLD-MCNC: 125 MG/DL (ref 70–99)
GLUCOSE BLD-MCNC: 142 MG/DL (ref 70–99)
HCT VFR BLD AUTO: 33.7 % (ref 35–48)
HGB BLD-MCNC: 11 G/DL (ref 12–16)
IMM GRANULOCYTES # BLD AUTO: 0.03 X10(3) UL (ref 0–1)
IMM GRANULOCYTES NFR BLD: 0.3 %
LYMPHOCYTES # BLD AUTO: 2.43 X10(3) UL (ref 1–4)
LYMPHOCYTES NFR BLD AUTO: 25.1 %
MAGNESIUM SERPL-MCNC: 1.8 MG/DL (ref 1.6–2.6)
MCH RBC QN AUTO: 30.1 PG (ref 26–34)
MCHC RBC AUTO-ENTMCNC: 32.6 G/DL (ref 31–37)
MCV RBC AUTO: 92.1 FL (ref 80–100)
MONOCYTES # BLD AUTO: 1.35 X10(3) UL (ref 0.1–1)
MONOCYTES NFR BLD AUTO: 14 %
NEUTROPHILS # BLD AUTO: 5.59 X10 (3) UL (ref 1.5–7.7)
NEUTROPHILS # BLD AUTO: 5.59 X10(3) UL (ref 1.5–7.7)
NEUTROPHILS NFR BLD AUTO: 57.8 %
OSMOLALITY SERPL CALC.SUM OF ELEC: 301 MOSM/KG (ref 275–295)
PLATELET # BLD AUTO: 301 10(3)UL (ref 150–450)
POTASSIUM SERPL-SCNC: 4.1 MMOL/L (ref 3.5–5.1)
RBC # BLD AUTO: 3.66 X10(6)UL (ref 3.8–5.3)
SODIUM SERPL-SCNC: 145 MMOL/L (ref 136–145)
WBC # BLD AUTO: 9.7 X10(3) UL (ref 4–11)

## 2025-08-06 PROCEDURE — 99232 SBSQ HOSP IP/OBS MODERATE 35: CPT | Performed by: HOSPITALIST

## 2025-08-06 RX ORDER — METOPROLOL SUCCINATE 50 MG/1
50 TABLET, EXTENDED RELEASE ORAL
Status: DISCONTINUED | OUTPATIENT
Start: 2025-08-06 | End: 2025-08-07

## 2025-08-06 RX ORDER — MAGNESIUM OXIDE 400 MG/1
400 TABLET ORAL ONCE
Status: COMPLETED | OUTPATIENT
Start: 2025-08-06 | End: 2025-08-06

## 2025-08-07 LAB
GLUCOSE BLD-MCNC: 110 MG/DL (ref 70–99)
GLUCOSE BLD-MCNC: 130 MG/DL (ref 70–99)
GLUCOSE BLD-MCNC: 139 MG/DL (ref 70–99)
GLUCOSE BLD-MCNC: 99 MG/DL (ref 70–99)
MAGNESIUM SERPL-MCNC: 1.7 MG/DL (ref 1.6–2.6)

## 2025-08-07 PROCEDURE — 99232 SBSQ HOSP IP/OBS MODERATE 35: CPT | Performed by: HOSPITALIST

## 2025-08-07 RX ORDER — MAGNESIUM OXIDE 400 MG/1
400 TABLET ORAL ONCE
Status: COMPLETED | OUTPATIENT
Start: 2025-08-07 | End: 2025-08-07

## 2025-08-08 VITALS
HEART RATE: 69 BPM | OXYGEN SATURATION: 97 % | TEMPERATURE: 99 F | DIASTOLIC BLOOD PRESSURE: 69 MMHG | RESPIRATION RATE: 17 BRPM | BODY MASS INDEX: 31.15 KG/M2 | WEIGHT: 193.81 LBS | HEIGHT: 66 IN | SYSTOLIC BLOOD PRESSURE: 156 MMHG

## 2025-08-08 LAB
GLUCOSE BLD-MCNC: 109 MG/DL (ref 70–99)
GLUCOSE BLD-MCNC: 124 MG/DL (ref 70–99)
MAGNESIUM SERPL-MCNC: 2 MG/DL (ref 1.6–2.6)

## 2025-08-08 PROCEDURE — 99239 HOSP IP/OBS DSCHRG MGMT >30: CPT | Performed by: HOSPITALIST

## 2025-08-08 RX ORDER — METOPROLOL SUCCINATE 100 MG/1
100 TABLET, EXTENDED RELEASE ORAL
Status: DISCONTINUED | OUTPATIENT
Start: 2025-08-08 | End: 2025-08-08

## 2025-08-08 RX ORDER — METOPROLOL SUCCINATE 100 MG/1
100 TABLET, EXTENDED RELEASE ORAL
Qty: 60 TABLET | Refills: 2 | Status: SHIPPED | OUTPATIENT
Start: 2025-08-08 | End: 2025-11-06

## 2025-08-11 ENCOUNTER — PATIENT OUTREACH (OUTPATIENT)
Age: 80
End: 2025-08-11

## 2025-08-11 ENCOUNTER — PATIENT OUTREACH (OUTPATIENT)
Dept: CASE MANAGEMENT | Age: 80
End: 2025-08-11

## 2025-08-14 DIAGNOSIS — I10 ESSENTIAL HYPERTENSION: ICD-10-CM

## 2025-08-14 RX ORDER — LISINOPRIL 5 MG/1
5 TABLET ORAL DAILY
Qty: 90 TABLET | Refills: 0 | Status: SHIPPED | OUTPATIENT
Start: 2025-08-14

## 2025-08-15 ENCOUNTER — OFFICE VISIT (OUTPATIENT)
Dept: INTERNAL MEDICINE CLINIC | Facility: CLINIC | Age: 80
End: 2025-08-15

## 2025-08-15 VITALS
SYSTOLIC BLOOD PRESSURE: 128 MMHG | HEART RATE: 76 BPM | RESPIRATION RATE: 16 BRPM | DIASTOLIC BLOOD PRESSURE: 64 MMHG | BODY MASS INDEX: 31.44 KG/M2 | TEMPERATURE: 97 F | WEIGHT: 195.63 LBS | HEIGHT: 66 IN

## 2025-08-15 DIAGNOSIS — J18.9 MULTIFOCAL PNEUMONIA: Primary | ICD-10-CM

## 2025-08-15 DIAGNOSIS — D64.9 ANEMIA, UNSPECIFIED TYPE: ICD-10-CM

## 2025-08-15 DIAGNOSIS — E11.69 TYPE 2 DIABETES MELLITUS WITH OTHER SPECIFIED COMPLICATION, WITHOUT LONG-TERM CURRENT USE OF INSULIN (HCC): ICD-10-CM

## 2025-08-15 DIAGNOSIS — I47.10 SVT (SUPRAVENTRICULAR TACHYCARDIA) (HCC): ICD-10-CM

## 2025-08-15 DIAGNOSIS — E03.9 ACQUIRED HYPOTHYROIDISM: ICD-10-CM

## 2025-08-15 DIAGNOSIS — R60.0 BILATERAL LEG EDEMA: ICD-10-CM

## 2025-08-15 DIAGNOSIS — R53.83 OTHER FATIGUE: ICD-10-CM

## 2025-08-15 DIAGNOSIS — I10 ESSENTIAL HYPERTENSION: ICD-10-CM

## 2025-08-15 RX ORDER — BENZONATATE 200 MG/1
200 CAPSULE ORAL 3 TIMES DAILY PRN
Qty: 45 CAPSULE | Refills: 0 | Status: SHIPPED | OUTPATIENT
Start: 2025-08-15

## (undated) DIAGNOSIS — Z01.812 ENCOUNTER FOR PREPROCEDURE SCREENING LABORATORY TESTING FOR COVID-19: ICD-10-CM

## (undated) DIAGNOSIS — N39.0 URINARY TRACT INFECTION WITHOUT HEMATURIA, SITE UNSPECIFIED: Primary | ICD-10-CM

## (undated) DIAGNOSIS — N95.0 PMB (POSTMENOPAUSAL BLEEDING): ICD-10-CM

## (undated) DIAGNOSIS — N85.02 COMPLEX ATYPICAL ENDOMETRIAL HYPERPLASIA: Primary | ICD-10-CM

## (undated) DIAGNOSIS — Z20.822 ENCOUNTER FOR PREPROCEDURE SCREENING LABORATORY TESTING FOR COVID-19: ICD-10-CM

## (undated) DEVICE — TUBING CYSTO

## (undated) DEVICE — ELECTRODE ES L16.5CM BLDE MPLR OPN APPRCH EZ

## (undated) DEVICE — 3M™ RED DOT™ MONITORING ELECTRODE WITH FOAM TAPE AND STICKY GEL, 50/BAG, 20/CASE, 72/PLT 2570: Brand: RED DOT™

## (undated) DEVICE — TOTAL HIP CDS: Brand: MEDLINE INDUSTRIES, INC.

## (undated) DEVICE — ENDOSCOPY PACK - LOWER: Brand: MEDLINE INDUSTRIES, INC.

## (undated) DEVICE — Device

## (undated) DEVICE — DRAPE,T,LAPARO,TRANS,STERILE: Brand: MEDLINE

## (undated) DEVICE — PAD SANITARY 10IN

## (undated) DEVICE — 3M™ STERI-STRIP™ REINFORCED ADHESIVE SKIN CLOSURES, R1547, 1/2 IN X 4 IN (12 MM X 100 MM), 6 STRIPS/ENVELOPE: Brand: 3M™ STERI-STRIP™

## (undated) DEVICE — SCD SLEEVE KNEE HI BLEND

## (undated) DEVICE — BANDAGE,COHESIVE,TAN,4X5YD,LF,STRL: Brand: MEDLINE

## (undated) DEVICE — ADHESIVE SKIN TOP FOR WND CLSR DERMBND ADV

## (undated) DEVICE — STRYKER PERFORMANCE SERIES SAGITTAL BLADE: Brand: STRYKER PERFORMANCE SERIES

## (undated) DEVICE — LAPAROTOMY SPONGE - RF AND X-RAY DETECTABLE PRE-WASHED: Brand: SITUATE

## (undated) DEVICE — DRESSING ANTIMIC 3.5 X 12 IN AQCEL AG ADVNTG

## (undated) DEVICE — SOL  .9 3000ML

## (undated) DEVICE — GLOVE SUR 7.5 SENSICARE PI PIP CRM PWD F

## (undated) DEVICE — FEMORAL CANAL BRUSH, IRRIGATION/SUCTION

## (undated) DEVICE — CHLORAPREP 26ML APPLICATOR

## (undated) DEVICE — SOL  .9 1000ML BTL

## (undated) DEVICE — ANTIBACTERIAL UNDYED BRAIDED (POLYGLACTIN 910), SYNTHETIC ABSORBABLE SUTURE: Brand: COATED VICRYL

## (undated) DEVICE — WRAP HIP COMPR

## (undated) DEVICE — PANTS KNIT WASHABLE 2/3XL

## (undated) DEVICE — GLOVE SUR 7.5 SENSICARE PI PIP GRN PWD F

## (undated) DEVICE — NEEDLE SPNL 18GA L3.5IN PNK QNCKE STYL DISP

## (undated) DEVICE — SUTURE PLAIN GUT 3-0 CT-1

## (undated) DEVICE — SUTURE VICRYL 0 CT-1

## (undated) DEVICE — STERILE POLYISOPRENE POWDER-FREE SURGICAL GLOVES: Brand: PROTEXIS

## (undated) DEVICE — SUTURE VICRYL 2-0 CT-1

## (undated) DEVICE — SOLUTION IRRIG 1000ML 0.9% NACL USP BTL

## (undated) DEVICE — SUT COAT VCRL 0 27IN CP-1 ABSRB UD 36MM 1/2

## (undated) DEVICE — ZZ-CONVERTED-TO-498237-PILLOW ABD SM W12XH6XL18IN LEG FOAM NYLEX CVR

## (undated) DEVICE — GYN CDS: Brand: MEDLINE INDUSTRIES, INC.

## (undated) DEVICE — SUT ETHBND XL 5 30IN V-37 NABSRB GRN 40MM 1/2

## (undated) DEVICE — SYRINGE MED 30ML STD CLR PLAS LL TIP N CTRL

## (undated) DEVICE — UNDYED BRAIDED (POLYGLACTIN 910), SYNTHETIC ABSORBABLE SUTURE: Brand: COATED VICRYL

## (undated) DEVICE — INTENDED TO AID IN THE PASSING OF SUTURES THROUGH BONE AND SOFT TISSUE DURING ORTHOPEDIC SURGERY: Brand: HOFFEE SUTURE RETRIEVER

## (undated) DEVICE — SOLUTION IRRIG 3000ML 0.9% NACL FLX CONT

## (undated) DEVICE — SOLUTION RUBBING 4OZ 70% ISO ALC CLR

## (undated) DEVICE — SUT MCRYL 4-0 18IN PS-2 ABSRB UD 19MM 3/8 CIR

## (undated) DEVICE — SUTURE VICRYL 0

## (undated) DEVICE — ABSORBABLE HEMOSTAT (OXIDIZED REGENERATED CELLULOSE, U.S.P.): Brand: SURGICEL

## (undated) DEVICE — SLEEVE COMPR MD KNEE LEN SGL USE KENDALL SCD

## (undated) DEVICE — SUTURE CHROMIC GUT 2-0 SH

## (undated) DEVICE — GLOVE SUR 7 SENSICARE PI PIP CRM PWD F

## (undated) DEVICE — HOOD: Brand: FLYTE

## (undated) DEVICE — SPONGE STICK WITH PVP-I: Brand: KENDALL

## (undated) DEVICE — GLOVE SUR 6.5 SENSICARE PI PIP CRM PWD F

## (undated) DEVICE — Device: Brand: DEFENDO AIR/WATER/SUCTION AND BIOPSY VALVE

## (undated) DEVICE — 1200CC GUARDIAN II: Brand: GUARDIAN

## (undated) DEVICE — UNIVERSAL STERIBUMP® STERILE (5/CASE): Brand: UNIVERSAL STERIBUMP®

## (undated) DEVICE — 450 ML BOTTLE OF 0.05% CHLORHEXIDINE GLUCONATE IN 99.95% STERILE WATER FOR IRRIGATION, USP AND APPLICATOR.: Brand: IRRISEPT ANTIMICROBIAL WOUND LAVAGE

## (undated) DEVICE — DRAPE,U/SHT,SPLIT,FILM,60X84,STERILE: Brand: MEDLINE

## (undated) DEVICE — CEMENT MIXING SYSTEM WITH FEMORAL BREAKWAY NOZZLE: Brand: REVOLUTION

## (undated) DEVICE — SUTURE VICRYL 1 CT-1

## (undated) DEVICE — SUT STRATAFIX SYMMTRC PDS+ 1 18IN CTX ABSRB V

## (undated) DEVICE — FILTERLINE NASAL ADULT O2/CO2

## (undated) DEVICE — LAPAROTOMY CDS: Brand: MEDLINE INDUSTRIES, INC.

## (undated) DEVICE — SPONGE RAYTEC 4X4 RF DETECT

## (undated) DEVICE — COVER,LIGHT,CAMERA,HARD,1/PK,STRL: Brand: MEDLINE

## (undated) NOTE — LETTER
Diabetes Retinal Eye Examination Report    Patient Name: Monica Nieto                                        : 1945    Referring Physician: Rosette Ivy MD  _____________________________________________________________________________  Patient - Please bring this form to your next eye examination appointment.   Give it to your eye care professional to fill out and return via fax to your primary care provider.     Eye Care Professional - Please fill out this form and fax it back to the referring  primary care physician.   _____________________________________________________________________________     Date of Exam: ___/___/___    The patient received a dilated fundus examination with the following results:    ___ No diabetic retinopathy detected  ___ Background retinopathy was detected, but only requires monitoring  ___ Retinopathy requiring further testing and/or treatment was detected. See notes below.    Notes / Commentary / Recommendations:  _________________________________________________________________________________________________________________________________________________________________________________________________________________________________    The patient is to return for follow-up / evaluation in ____ months.    Eye Care Provider (Print): _______________________Signature___________________ Date ___ / ___/___    Clinic/Office name: _______________________Ph:_____________Fax:_____________

## (undated) NOTE — LETTER
Sally Flaherty [address] [facility]  Authorization for Surgical Operation and Procedure   Date:___________                                                                                            Time:__________  1. I hereby Jayden Pierce DO, my physician and his/her assistants (if applicable), which may include medical students, residents, and/or fellows, to perform the following surgical operation/ procedure and administer such anesthesia as may be determined necessary by my physician:  Operation/Procedure name (s) [address] [facility]   on [address]   2. I recognize that during the surgical operation/procedure, unforeseen conditions may necessitate additional or different procedures than those listed above. I, therefore, further authorize and request that the above-named surgeon, assistants, or designees perform such procedures as are, in their judgment, necessary and desirable. 3.   My surgeon/physician has discussed prior to my surgery the potential benefits, risks and side effects of this procedure; the likelihood of achieving goals; and potential problems that might occur during recuperation. They also discussed reasonable alternatives to the procedure, including risks, benefits, and side effects related to the alternatives and risks related to not receiving this procedure. I have had all my questions answered and I acknowledge that no guarantee has been made as to the result that may be obtained. 4.   Should the need arise during my operation or immediate post-operative period, I also consent to the administration of blood and/or blood products. Further, I understand that despite careful testing and screening of blood or blood products by collecting agencies, I may still be subject to ill effects as a result of receiving a blood transfusion and/or blood products.   The following are some, but not all, of the potential risks that can occur: fever and allergic reactions, hemolytic reactions, transmission of diseases such as Hepatitis, AIDS and Cytomegalovirus (CMV) and fluid overload. In the event that I wish to have an autologous transfusion of my own blood, or a directed donor transfusion. I will discuss this with my physician. 5.   I authorize the use of any specimen, organs, tissues, body parts or foreign objects that may be removed from my body during the operation/procedure for diagnosis, research or teaching purposes and their subsequent disposal by hospital authorities. I also authorize the release of specimen test results and/or written reports to my treating physician on the hospital medical staff or other referring or consulting physicians involved in my care, at the discretion of the Pathologist or my treating physician. 6.   I consent to the photographing or videotaping of the operations or procedures to be performed, including appropriate portions of my body for medical, scientific, or educational purposes, provided my identity is not revealed by the pictures or by descriptive texts accompanying them. If the procedure has been photographed/videotaped, the surgeon will obtain the original picture, image, videotape or CD. The hospital will not be responsible for storage, release or maintenance of the picture, image, tape or CD.    7.   I consent to the presence of a  or observers in the operating room as deemed necessary by my physician or their designees. 8.   I recognize that in the event my procedure results in extended X-Ray/fluoroscopy time, I may develop a skin reaction. 9. If I have a Do Not Attempt Resuscitation (DNAR) order in place, that status will be suspended while in the operating room, procedural suite, and during the recovery period unless otherwise explicitly stated by me (or a person authorized to consent on my behalf).  The surgeon or my attending physician will determine when the applicable recovery period ends for purposes of reinstating the DNAR order. 10. Patients having a sterilization procedure: I understand that if the procedure is successful the results will be permanent and it will therefore be impossible for me to inseminate, conceive, or bear children. I also understand that the procedure is intended to result in sterility, although the result has not been guaranteed. 11. I acknowledge that my physician has explained sedation/analgesia administration to me including the risk and benefits I consent to the administration of sedation/analgesia as may be necessary or desirable in the judgment of my physician.     I CERTIFY THAT I HAVE READ AND FULLY UNDERSTAND THE ABOVE CONSENT TO OPERATION and/or OTHER PROCEDURE.      _________________________________________  __________________________________  Signature of Patient     Signature of Responsible Person         ___________________________________         Printed Name of Responsible Person           _________________________________                 Relationship to Patient  _________________________________________  ______________________________  Signature of Witness          Date  Time          Patient Name: Erin Bustamante     : 1945                 Printed: 2022     Medical Record #: [id number]                                            Page 1 of 1

## (undated) NOTE — Clinical Note
[address]  [address]  Authorization for Surgical Operation and Procedure     Date:___________                                                                                                         Time:__________  1. I hereby authorize Surgeon(s):  Valdez Anna MD, my physician and his/her assistants (if applicable), which may include medical students, residents, and/or fellows, to perform the following surgical operation/ procedure and administer such anesthesia as may be determined necessary by my physician:  Operation/Procedure name (s) Procedure(s):  LEFT HIP HEMIARTHROPLASTY on Monica Nieto   2.   I recognize that during the surgical operation/procedure, unforeseen conditions may necessitate additional or different procedures than those listed above.  I, therefore, further authorize and request that the above-named surgeon, assistants, or designees perform such procedures as are, in their judgment, necessary and desirable.    3.   My surgeon/physician has discussed prior to my surgery the potential benefits, risks and side effects of this procedure; the likelihood of achieving goals; and potential problems that might occur during recuperation.  They also discussed reasonable alternatives to the procedure, including risks, benefits, and side effects related to the alternatives and risks related to not receiving this procedure.  I have had all my questions answered and I acknowledge that no guarantee has been made as to the result that may be obtained.    4.   Should the need arise during my operation/procedure, which includes change of level of care prior to discharge, I also consent to the administration of blood and/or blood products.  Further, I understand that despite careful testing and screening of blood or blood products by collecting agencies, I may still be subject to ill effects as a result of receiving a blood transfusion and/or blood products.  The  following are some, but not all, of the potential risks that can occur: fever and allergic reactions, hemolytic reactions, transmission of diseases such as Hepatitis, AIDS and Cytomegalovirus (CMV) and fluid overload.  In the event that I wish to have an autologous transfusion of my own blood, or a directed donor transfusion, I will discuss this with my physician.  Check only if Refusing Blood or Blood Products  I understand refusal of blood or blood products as deemed necessary by my physician may have serious consequences to my condition to include possible death. I hereby assume responsibility for my refusal and release the hospital, its personnel, and my physicians from any responsibility for the consequences of my refusal.          o  Refuse      5.   I authorize the use of any specimen, organs, tissues, body parts or foreign objects that may be removed from my body during the operation/procedure for diagnosis, research or teaching purposes and their subsequent disposal by hospital authorities.  I also authorize the release of specimen test results and/or written reports to my treating physician on the hospital medical staff or other referring or consulting physicians involved in my care, at the discretion of the Pathologist or my treating physician.    6.   I consent to the photographing or videotaping of the operations or procedures to be performed, including appropriate portions of my body for medical, scientific, or educational purposes, provided my identity is not revealed by the pictures or by descriptive texts accompanying them.  If the procedure has been photographed/videotaped, the surgeon will obtain the original picture, image, videotape or CD.  The hospital will not be responsible for storage, release or maintenance of the picture, image, tape or CD.    7.   I consent to the presence of a  or observers in the operating room as deemed necessary by my physician or their designees.     8.   I recognize that in the event my procedure results in extended X-Ray/fluoroscopy time, I may develop a skin reaction.    9. If I have a Do Not Attempt Resuscitation (DNAR) order in place, that status will be suspended while in the operating room, procedural suite, and during the recovery period unless otherwise explicitly stated by me (or a person authorized to consent on my behalf). The surgeon or my attending physician will determine when the applicable recovery period ends for purposes of reinstating the DNAR order.  10. Patients having a sterilization procedure: I understand that if the procedure is successful the results will be permanent and it will therefore be impossible for me to inseminate, conceive, or bear children.  I also understand that the procedure is intended to result in sterility, although the result has not been guaranteed.   11. I acknowledge that my physician has explained sedation/analgesia administration to me including the risk and benefits I consent to the administration of sedation/analgesia as may be necessary or desirable in the judgment of my physician.    I CERTIFY THAT I HAVE READ AND FULLY UNDERSTAND THE ABOVE CONSENT TO OPERATION and/or OTHER PROCEDURE.    _________________________________________  __________________________________  Signature of Patient     Signature of Responsible Person         ___________________________________         Printed Name of Responsible Person           _________________________________                 Relationship to Patient  _________________________________________  ______________________________  Signature of Witness          Date  Time      Patient Name: Monica MERIDA Emilia     : 1945                 Printed: 2024     Medical Record #: [id number]                     Page 1 [address]  [address]    Consent for Anesthesia    1. I, Monica Nieto agree to  be cared for by an anesthesiologist, who is specially trained to monitor me and give me medicine to put me to sleep or keep me comfortable during my procedure    I understand that my anesthesiologist is not an employee or agent of [facility] or [employer]. He or she works for [employer] [employer].    2. As the patient asking for anesthesia services, I agree to:  a. Allow the anesthesiologist (anesthesia doctor) to give me medicine and do additional procedures as necessary. Some examples are: Starting or using an “IV” to give me medicine, fluids or blood during my procedure, and having a breathing tube placed to help me breathe when I’m asleep (intubation). In the event that my heart stops working properly, I understand that my anesthesiologist will make every effort to sustain my life, unless otherwise directed by [facility] Do Not Resuscitate documents.  b. Tell my anesthesia doctor before my procedure:  i. If I am pregnant.  ii. The last time that I ate or drank.  iii. All of the medicines I take (including prescriptions, herbal supplements, and pills I can buy without a prescription (including street drugs/illegal medications). Failure to inform my anesthesiologist about these medicines may increase my risk of anesthetic complications.  iv. If I am allergic to anything or have had a reaction to anesthesia before.  3. I understand how the anesthesia medicine will help me (benefits).  4. I understand that with any type of anesthesia medicine there are risks:  a. The most common risks are: nausea, vomiting, sore throat, muscle soreness, damage to my eyes, mouth, or teeth (from breathing tube placement).  b. Rare risks include: remembering what happened during my procedure, allergic reactions to medications, injury to my airway, heart, lungs, vision, nerves, or muscles and in extremely rare instances death.  5. My doctor has explained to me other choices available to me for my care  (alternatives).  6. Pregnant Patients (“epidural”):  I understand that the risks of having an epidural (medicine given into my back to help control pain during labor), include itching, low blood pressure, difficulty urinating, headache or slowing of the baby’s heart. Very rare risks include infection, bleeding, seizure, irregular heart rhythms and nerve injury.  7. Regional Anesthesia (“spinal”, “epidural”, & “nerve blocks”):  I understand that rare but potential complications include headache, bleeding, infection, seizure, irregular heart rhythms, and nerve injury.    I can change my mind about having anesthesia services at any time before I get the medicine.    _____________________________________________________________________________  Patient (or Representative) Signature/Relationship to Patient  Date   Time    _____________________________________________________________________________   Name (if used)    Language/Organization   Time    _____________________________________________________________________________  Anesthesiologist Signature     Date   Time  I have discussed the procedure and information above with the patient (or patient’s representative) and answered their questions. The patient or their representative has agreed to have anesthesia services.    _____________________________________________________________________________  Witness        Date   Time  I have verified that the signature is that of the patient or patient’s representative, and that it was signed before the procedure  Patient Name: Monica Nieto     : 1945                 Printed: 2024     Medical Record #: [id number]                     Page 2 of 2

## (undated) NOTE — MR AVS SNAPSHOT
[address]  [phone]               Thank you for choosing us for your health care visit with [address] PT. We are glad to serve you and happy to provide you with this summary of your visit.   Please he the building. For security purposes, please check in with the reception staff at every visit.                                           Apr 27, 2017  9:30 AM   PT VISIT BY THERAPIST with [facility] [place] (EDW Se Visit [facility]  You can access your MyChart to more actively manage your health care and view more details from this visit by going to [email or web address].   If you've recently had a stay at the Hospital you can access your discharge instructions i

## (undated) NOTE — MR AVS SNAPSHOT
JAKOB [address]  [phone]               Thank you for choosing us for your health care visit with [address], PT. We are glad to serve you and happy to provide you with this summary of your visit.   Please he the building. For security purposes, please check in with the reception staff at every visit.                                             Instructions and Information about Your Health     None      Allergies as of Apr 13, 2017     No Known Allergies

## (undated) NOTE — LETTER
[address], [address] [facility], [facility] [address]  [address] [address] [place] [address], [address]   [phone]    [address] [facility], [address] [place] [address], [address] [place] [address]   [phone]       September 19, 2017    [address]

## (undated) NOTE — MR AVS SNAPSHOT
JAKOB [address]  [phone]               Thank you for choosing us for your health care visit with [address] PT. We are glad to serve you and happy to provide you with this summary of your visit.   Please he metoprolol Tartrate 25 MG Tabs   Take 0.5 tablets (12.5 mg total) by mouth daily. Commonly known as:  LOPRESSOR           PREMARIN 0.625 MG/GM Crea   Generic drug:  Estrogens, Conjugated   Place 0.5 g vaginally daily.                    MyChart     Visit

## (undated) NOTE — Clinical Note
Nehal Oviedo-  Please see my note. Patient is confused and upset and I am trying to get things right for her. She has gone home and is awaiting my call back to her.   Rudy VAUGHN only   thanks

## (undated) NOTE — LETTER
12/08/17        [facility] [address]    2/8/1945     Dear Blanche ,    [facility] records indicate that you have outstanding lab work and or testing that was ordered for you and has not yet been completed:          CMP      Lipid P

## (undated) NOTE — MR AVS SNAPSHOT
[address] [id number] [phone]               Thank you for choosing us for your health care visit with Julio Jimenez MD.  We are glad to serve you and happy to provid #:  [id number]         **THIS IS NOT A REFERRAL**  Your physician has recommended you to [id number].   If your insurance requires you to obtain a managed care referral, please allow 3 working days from the date of this order for the referral to 122/82 mmHg 59 97.6 °F (36.4 °C) (Oral) 65.5\" 199 lb 3.2 oz 32.63 kg/m2         Current Medications          This list is accurate as of: 3/28/17  4:50 PM.  Always use your most recent med list.                aspirin 325 MG Tabs   Take 325 mg by mouth d